# Patient Record
Sex: FEMALE | Race: WHITE | NOT HISPANIC OR LATINO | Employment: OTHER | URBAN - METROPOLITAN AREA
[De-identification: names, ages, dates, MRNs, and addresses within clinical notes are randomized per-mention and may not be internally consistent; named-entity substitution may affect disease eponyms.]

---

## 2017-03-07 ENCOUNTER — HISTORICAL (OUTPATIENT)
Dept: RADIOLOGY | Facility: HOSPITAL | Age: 72
End: 2017-03-07

## 2017-04-04 ENCOUNTER — HISTORICAL (OUTPATIENT)
Dept: ADMINISTRATIVE | Facility: HOSPITAL | Age: 72
End: 2017-04-04

## 2017-08-15 ENCOUNTER — HISTORICAL (OUTPATIENT)
Dept: ADMINISTRATIVE | Facility: HOSPITAL | Age: 72
End: 2017-08-15

## 2017-10-05 ENCOUNTER — HISTORICAL (OUTPATIENT)
Dept: ADMINISTRATIVE | Facility: HOSPITAL | Age: 72
End: 2017-10-05

## 2017-10-05 LAB
ABS NEUT (OLG): 3.26 X10(3)/MCL (ref 2.1–9.2)
ALBUMIN SERPL-MCNC: 4.3 GM/DL (ref 3.4–5)
ALBUMIN/GLOB SERPL: 1.4 {RATIO}
ALP SERPL-CCNC: 60 UNIT/L (ref 38–126)
ALT SERPL-CCNC: 16 UNIT/L (ref 12–78)
APPEARANCE, UA: CLEAR
AST SERPL-CCNC: 17 UNIT/L (ref 15–37)
BACTERIA SPEC CULT: ABNORMAL /HPF
BASOPHILS # BLD AUTO: 0 X10(3)/MCL (ref 0–0.2)
BASOPHILS NFR BLD AUTO: 0 %
BILIRUB SERPL-MCNC: 0.6 MG/DL (ref 0.2–1)
BILIRUB UR QL STRIP: NEGATIVE
BILIRUBIN DIRECT+TOT PNL SERPL-MCNC: 0.1 MG/DL (ref 0–0.2)
BILIRUBIN DIRECT+TOT PNL SERPL-MCNC: 0.5 MG/DL (ref 0–0.8)
BUN SERPL-MCNC: 20 MG/DL (ref 7–18)
CALCIUM SERPL-MCNC: 9.4 MG/DL (ref 8.5–10.1)
CHLORIDE SERPL-SCNC: 100 MMOL/L (ref 98–107)
CHOLEST SERPL-MCNC: 212 MG/DL (ref 0–200)
CHOLEST/HDLC SERPL: 3.5 {RATIO} (ref 0–4)
CO2 SERPL-SCNC: 32 MMOL/L (ref 21–32)
COLOR UR: YELLOW
CREAT SERPL-MCNC: 0.54 MG/DL (ref 0.55–1.02)
EOSINOPHIL # BLD AUTO: 0.1 X10(3)/MCL (ref 0–0.9)
EOSINOPHIL NFR BLD AUTO: 2 %
ERYTHROCYTE [DISTWIDTH] IN BLOOD BY AUTOMATED COUNT: 12.5 % (ref 11.5–17)
EST. AVERAGE GLUCOSE BLD GHB EST-MCNC: 128 MG/DL
GLOBULIN SER-MCNC: 3.1 GM/DL (ref 2.4–3.5)
GLUCOSE (UA): NEGATIVE
GLUCOSE SERPL-MCNC: 99 MG/DL (ref 74–106)
HBA1C MFR BLD: 6.1 % (ref 4.2–6.3)
HCT VFR BLD AUTO: 46.2 % (ref 37–47)
HDLC SERPL-MCNC: 61 MG/DL (ref 35–60)
HGB BLD-MCNC: 15.6 GM/DL (ref 12–16)
HGB UR QL STRIP: NEGATIVE
KETONES UR QL STRIP: NEGATIVE
LDLC SERPL CALC-MCNC: 120 MG/DL (ref 0–129)
LEUKOCYTE ESTERASE UR QL STRIP: ABNORMAL
LYMPHOCYTES # BLD AUTO: 1.7 X10(3)/MCL (ref 0.6–4.6)
LYMPHOCYTES NFR BLD AUTO: 29 %
MCH RBC QN AUTO: 32.8 PG (ref 27–31)
MCHC RBC AUTO-ENTMCNC: 33.8 GM/DL (ref 33–36)
MCV RBC AUTO: 97.1 FL (ref 80–94)
MONOCYTES # BLD AUTO: 0.6 X10(3)/MCL (ref 0.1–1.3)
MONOCYTES NFR BLD AUTO: 10 %
NEUTROPHILS # BLD AUTO: 3.26 X10(3)/MCL (ref 1.4–7.9)
NEUTROPHILS NFR BLD AUTO: 57 %
NITRITE UR QL STRIP: NEGATIVE
PH UR STRIP: 6.5 [PH] (ref 5–9)
PLATELET # BLD AUTO: 163 X10(3)/MCL (ref 130–400)
PMV BLD AUTO: 9.7 FL (ref 9.4–12.4)
POTASSIUM SERPL-SCNC: 4.2 MMOL/L (ref 3.5–5.1)
PROT SERPL-MCNC: 7.4 GM/DL (ref 6.4–8.2)
PROT UR QL STRIP: NEGATIVE
RBC # BLD AUTO: 4.76 X10(6)/MCL (ref 4.2–5.4)
RBC #/AREA URNS HPF: ABNORMAL /[HPF]
SODIUM SERPL-SCNC: 136 MMOL/L (ref 136–145)
SP GR UR STRIP: 1.01 (ref 1–1.03)
SQUAMOUS EPITHELIAL, UA: ABNORMAL
T4 FREE SERPL-MCNC: 0.98 NG/DL (ref 0.76–1.46)
TRIGL SERPL-MCNC: 157 MG/DL (ref 30–150)
TSH SERPL-ACNC: 0.41 MIU/ML (ref 0.36–3.74)
UROBILINOGEN UR STRIP-ACNC: 0.2
VLDLC SERPL CALC-MCNC: 31 MG/DL
WBC # SPEC AUTO: 5.7 X10(3)/MCL (ref 4.5–11.5)
WBC #/AREA URNS HPF: ABNORMAL /[HPF]

## 2017-12-12 ENCOUNTER — HISTORICAL (OUTPATIENT)
Dept: ADMINISTRATIVE | Facility: HOSPITAL | Age: 72
End: 2017-12-12

## 2018-03-08 ENCOUNTER — HISTORICAL (OUTPATIENT)
Dept: RADIOLOGY | Facility: HOSPITAL | Age: 73
End: 2018-03-08

## 2018-04-05 ENCOUNTER — HISTORICAL (OUTPATIENT)
Dept: ADMINISTRATIVE | Facility: HOSPITAL | Age: 73
End: 2018-04-05

## 2018-04-05 LAB
ABS NEUT (OLG): 2.78 X10(3)/MCL (ref 2.1–9.2)
ALBUMIN SERPL-MCNC: 3.9 GM/DL (ref 3.4–5)
ALBUMIN/GLOB SERPL: 1.1 RATIO (ref 1.1–2)
ALP SERPL-CCNC: 62 UNIT/L (ref 38–126)
ALT SERPL-CCNC: 17 UNIT/L (ref 12–78)
APPEARANCE, UA: CLEAR
AST SERPL-CCNC: 20 UNIT/L (ref 15–37)
BACTERIA SPEC CULT: ABNORMAL /HPF
BASOPHILS # BLD AUTO: 0 X10(3)/MCL (ref 0–0.2)
BASOPHILS NFR BLD AUTO: 0 %
BILIRUB SERPL-MCNC: 0.6 MG/DL (ref 0.2–1)
BILIRUB UR QL STRIP: NEGATIVE
BILIRUBIN DIRECT+TOT PNL SERPL-MCNC: 0.1 MG/DL (ref 0–0.5)
BILIRUBIN DIRECT+TOT PNL SERPL-MCNC: 0.5 MG/DL (ref 0–0.8)
BUN SERPL-MCNC: 21 MG/DL (ref 7–18)
CALCIUM SERPL-MCNC: 9.1 MG/DL (ref 8.5–10.1)
CHLORIDE SERPL-SCNC: 99 MMOL/L (ref 98–107)
CHOLEST SERPL-MCNC: 198 MG/DL (ref 0–200)
CHOLEST/HDLC SERPL: 4.1 {RATIO} (ref 0–4)
CO2 SERPL-SCNC: 31 MMOL/L (ref 21–32)
COLOR UR: YELLOW
CREAT SERPL-MCNC: 0.64 MG/DL (ref 0.55–1.02)
CREAT UR-MCNC: 105 MG/DL
DEPRECATED CALCIDIOL+CALCIFEROL SERPL-MC: 31 NG/ML (ref 30–80)
EOSINOPHIL # BLD AUTO: 0.1 X10(3)/MCL (ref 0–0.9)
EOSINOPHIL NFR BLD AUTO: 3 %
ERYTHROCYTE [DISTWIDTH] IN BLOOD BY AUTOMATED COUNT: 12.3 % (ref 11.5–17)
EST. AVERAGE GLUCOSE BLD GHB EST-MCNC: 123 MG/DL
GLOBULIN SER-MCNC: 3.4 GM/DL (ref 2.4–3.5)
GLUCOSE (UA): NEGATIVE
GLUCOSE SERPL-MCNC: 115 MG/DL (ref 74–106)
HBA1C MFR BLD: 5.9 % (ref 4.2–6.3)
HCT VFR BLD AUTO: 44.1 % (ref 37–47)
HDLC SERPL-MCNC: 48 MG/DL (ref 35–60)
HGB BLD-MCNC: 14.1 GM/DL (ref 12–16)
HGB UR QL STRIP: NEGATIVE
KETONES UR QL STRIP: NEGATIVE
LDLC SERPL CALC-MCNC: 108 MG/DL (ref 0–129)
LEUKOCYTE ESTERASE UR QL STRIP: NEGATIVE
LYMPHOCYTES # BLD AUTO: 1.4 X10(3)/MCL (ref 0.6–4.6)
LYMPHOCYTES NFR BLD AUTO: 29 %
MCH RBC QN AUTO: 31.5 PG (ref 27–31)
MCHC RBC AUTO-ENTMCNC: 32 GM/DL (ref 33–36)
MCV RBC AUTO: 98.7 FL (ref 80–94)
MICROALBUMIN UR-MCNC: 1.2 MG/DL
MICROALBUMIN/CREAT RATIO PNL UR: 11.4 MG/GM CR (ref 0–30)
MONOCYTES # BLD AUTO: 0.6 X10(3)/MCL (ref 0.1–1.3)
MONOCYTES NFR BLD AUTO: 12 %
NEUTROPHILS # BLD AUTO: 2.78 X10(3)/MCL (ref 1.4–7.9)
NEUTROPHILS NFR BLD AUTO: 56 %
NITRITE UR QL STRIP: NEGATIVE
PH UR STRIP: 7 [PH] (ref 5–9)
PLATELET # BLD AUTO: 151 X10(3)/MCL (ref 130–400)
PMV BLD AUTO: 9.7 FL (ref 9.4–12.4)
POTASSIUM SERPL-SCNC: 4.4 MMOL/L (ref 3.5–5.1)
PROT SERPL-MCNC: 7.3 GM/DL (ref 6.4–8.2)
PROT UR QL STRIP: NEGATIVE
RBC # BLD AUTO: 4.47 X10(6)/MCL (ref 4.2–5.4)
RBC #/AREA URNS HPF: ABNORMAL /[HPF]
SODIUM SERPL-SCNC: 137 MMOL/L (ref 136–145)
SP GR UR STRIP: 1.02 (ref 1–1.03)
SQUAMOUS EPITHELIAL, UA: ABNORMAL
T4 FREE SERPL-MCNC: 1.06 NG/DL (ref 0.76–1.46)
TRIGL SERPL-MCNC: 211 MG/DL (ref 30–150)
TSH SERPL-ACNC: 0.29 MIU/ML (ref 0.36–3.74)
UA WBC MAN: ABNORMAL /HPF
UROBILINOGEN UR STRIP-ACNC: 0.2
VLDLC SERPL CALC-MCNC: 42 MG/DL
WBC # SPEC AUTO: 5 X10(3)/MCL (ref 4.5–11.5)

## 2018-04-26 ENCOUNTER — HISTORICAL (OUTPATIENT)
Dept: LAB | Facility: HOSPITAL | Age: 73
End: 2018-04-26

## 2018-10-04 ENCOUNTER — HISTORICAL (OUTPATIENT)
Dept: ADMINISTRATIVE | Facility: HOSPITAL | Age: 73
End: 2018-10-04

## 2018-10-04 LAB
ABS NEUT (OLG): 4.83 X10(3)/MCL (ref 2.1–9.2)
ALBUMIN SERPL-MCNC: 3.6 GM/DL (ref 3.4–5)
ALBUMIN/GLOB SERPL: 1.1 RATIO (ref 1.1–2)
ALP SERPL-CCNC: 57 UNIT/L (ref 38–126)
ALT SERPL-CCNC: 14 UNIT/L (ref 12–78)
APPEARANCE, UA: CLEAR
AST SERPL-CCNC: 19 UNIT/L (ref 15–37)
BACTERIA SPEC CULT: ABNORMAL /HPF
BASOPHILS # BLD AUTO: 0 X10(3)/MCL (ref 0–0.2)
BASOPHILS NFR BLD AUTO: 0 %
BILIRUB SERPL-MCNC: 0.5 MG/DL (ref 0.2–1)
BILIRUB UR QL STRIP: NEGATIVE
BILIRUBIN DIRECT+TOT PNL SERPL-MCNC: 0.1 MG/DL (ref 0–0.5)
BILIRUBIN DIRECT+TOT PNL SERPL-MCNC: 0.4 MG/DL (ref 0–0.8)
BUN SERPL-MCNC: 22 MG/DL (ref 7–18)
CALCIUM SERPL-MCNC: 9.4 MG/DL (ref 8.5–10.1)
CHLORIDE SERPL-SCNC: 102 MMOL/L (ref 98–107)
CHOLEST SERPL-MCNC: 164 MG/DL (ref 0–200)
CHOLEST/HDLC SERPL: 3.6 {RATIO} (ref 0–4)
CO2 SERPL-SCNC: 32 MMOL/L (ref 21–32)
COLOR UR: YELLOW
CREAT SERPL-MCNC: 0.62 MG/DL (ref 0.55–1.02)
DEPRECATED CALCIDIOL+CALCIFEROL SERPL-MC: 29.92 NG/ML (ref 30–80)
EOSINOPHIL # BLD AUTO: 0.2 X10(3)/MCL (ref 0–0.9)
EOSINOPHIL NFR BLD AUTO: 2 %
ERYTHROCYTE [DISTWIDTH] IN BLOOD BY AUTOMATED COUNT: 12.5 % (ref 11.5–17)
EST. AVERAGE GLUCOSE BLD GHB EST-MCNC: 126 MG/DL
GLOBULIN SER-MCNC: 3.4 GM/DL (ref 2.4–3.5)
GLUCOSE (UA): NEGATIVE
GLUCOSE SERPL-MCNC: 105 MG/DL (ref 74–106)
HBA1C MFR BLD: 6 % (ref 4.2–6.3)
HCT VFR BLD AUTO: 42.4 % (ref 37–47)
HDLC SERPL-MCNC: 45 MG/DL (ref 35–60)
HGB BLD-MCNC: 13.3 GM/DL (ref 12–16)
HGB UR QL STRIP: NEGATIVE
KETONES UR QL STRIP: NEGATIVE
LDLC SERPL CALC-MCNC: 85 MG/DL (ref 0–129)
LEUKOCYTE ESTERASE UR QL STRIP: ABNORMAL
LYMPHOCYTES # BLD AUTO: 1.6 X10(3)/MCL (ref 0.6–4.6)
LYMPHOCYTES NFR BLD AUTO: 21 %
MCH RBC QN AUTO: 30.8 PG (ref 27–31)
MCHC RBC AUTO-ENTMCNC: 31.4 GM/DL (ref 33–36)
MCV RBC AUTO: 98.1 FL (ref 80–94)
MONOCYTES # BLD AUTO: 0.7 X10(3)/MCL (ref 0.1–1.3)
MONOCYTES NFR BLD AUTO: 10 %
NEUTROPHILS # BLD AUTO: 4.83 X10(3)/MCL (ref 2.1–9.2)
NEUTROPHILS NFR BLD AUTO: 66 %
NITRITE UR QL STRIP: NEGATIVE
PH UR STRIP: 7 [PH] (ref 5–9)
PLATELET # BLD AUTO: 164 X10(3)/MCL (ref 130–400)
PMV BLD AUTO: 9.4 FL (ref 9.4–12.4)
POTASSIUM SERPL-SCNC: 4.9 MMOL/L (ref 3.5–5.1)
PROT SERPL-MCNC: 7 GM/DL (ref 6.4–8.2)
PROT UR QL STRIP: NEGATIVE
RBC # BLD AUTO: 4.32 X10(6)/MCL (ref 4.2–5.4)
RBC #/AREA URNS HPF: ABNORMAL /[HPF]
SODIUM SERPL-SCNC: 141 MMOL/L (ref 136–145)
SP GR UR STRIP: 1.01 (ref 1–1.03)
SQUAMOUS EPITHELIAL, UA: ABNORMAL
T4 FREE SERPL-MCNC: 1.07 NG/DL (ref 0.76–1.46)
TRIGL SERPL-MCNC: 172 MG/DL (ref 30–150)
TSH SERPL-ACNC: 0.13 MIU/L (ref 0.36–3.74)
UROBILINOGEN UR STRIP-ACNC: 0.2
VLDLC SERPL CALC-MCNC: 34 MG/DL
WBC # SPEC AUTO: 7.3 X10(3)/MCL (ref 4.5–11.5)
WBC #/AREA URNS HPF: ABNORMAL /[HPF]

## 2018-10-18 ENCOUNTER — HISTORICAL (OUTPATIENT)
Dept: RADIOLOGY | Facility: HOSPITAL | Age: 73
End: 2018-10-18

## 2018-11-15 ENCOUNTER — HISTORICAL (OUTPATIENT)
Dept: ADMINISTRATIVE | Facility: HOSPITAL | Age: 73
End: 2018-11-15

## 2019-03-04 ENCOUNTER — HISTORICAL (OUTPATIENT)
Dept: ADMINISTRATIVE | Facility: HOSPITAL | Age: 74
End: 2019-03-04

## 2019-03-04 LAB
APPEARANCE, UA: CLEAR
BACTERIA #/AREA URNS AUTO: ABNORMAL /HPF
BILIRUB UR QL STRIP: NEGATIVE
COLOR UR: ABNORMAL
GLUCOSE (UA): NEGATIVE
HGB UR QL STRIP: NEGATIVE
KETONES UR QL STRIP: NEGATIVE
LEUKOCYTE ESTERASE UR QL STRIP: ABNORMAL
NITRITE UR QL STRIP.AUTO: NEGATIVE
PH UR STRIP: 8.5 [PH] (ref 5–9)
PROT UR QL STRIP: NEGATIVE
RBC #/AREA URNS HPF: ABNORMAL /[HPF]
SP GR UR STRIP: 1.02 (ref 1–1.03)
SQUAMOUS EPITHELIAL, UA: ABNORMAL
UROBILINOGEN UR STRIP-ACNC: 0.2
WBC #/AREA URNS AUTO: 23 /HPF (ref 0–3)

## 2019-04-02 ENCOUNTER — HISTORICAL (OUTPATIENT)
Dept: ADMINISTRATIVE | Facility: HOSPITAL | Age: 74
End: 2019-04-02

## 2019-04-02 LAB
ABS NEUT (OLG): 4.3 X10(3)/MCL (ref 2.1–9.2)
ALBUMIN SERPL-MCNC: 3.8 GM/DL (ref 3.4–5)
ALBUMIN/GLOB SERPL: 1.2 {RATIO}
ALP SERPL-CCNC: 67 UNIT/L (ref 38–126)
ALT SERPL-CCNC: 16 UNIT/L (ref 12–78)
APPEARANCE, UA: ABNORMAL
AST SERPL-CCNC: 20 UNIT/L (ref 15–37)
BACTERIA SPEC CULT: ABNORMAL /HPF
BASOPHILS # BLD AUTO: 0 X10(3)/MCL (ref 0–0.2)
BASOPHILS NFR BLD AUTO: 0 %
BILIRUB SERPL-MCNC: 0.4 MG/DL (ref 0.2–1)
BILIRUB UR QL STRIP: NEGATIVE
BILIRUBIN DIRECT+TOT PNL SERPL-MCNC: 0.2 MG/DL (ref 0–0.2)
BILIRUBIN DIRECT+TOT PNL SERPL-MCNC: 0.2 MG/DL (ref 0–0.8)
BUN SERPL-MCNC: 18 MG/DL (ref 7–18)
CALCIUM SERPL-MCNC: 8.8 MG/DL (ref 8.5–10.1)
CHLORIDE SERPL-SCNC: 103 MMOL/L (ref 98–107)
CHOLEST SERPL-MCNC: 189 MG/DL (ref 0–200)
CHOLEST/HDLC SERPL: 4.4 {RATIO} (ref 0–4)
CO2 SERPL-SCNC: 29 MMOL/L (ref 21–32)
COLOR UR: YELLOW
CREAT SERPL-MCNC: 0.65 MG/DL (ref 0.55–1.02)
CREAT UR-MCNC: 55 MG/DL
EOSINOPHIL # BLD AUTO: 0.2 X10(3)/MCL (ref 0–0.9)
EOSINOPHIL NFR BLD AUTO: 3 %
ERYTHROCYTE [DISTWIDTH] IN BLOOD BY AUTOMATED COUNT: 12.6 % (ref 11.5–17)
EST. AVERAGE GLUCOSE BLD GHB EST-MCNC: 140 MG/DL
GLOBULIN SER-MCNC: 3.2 GM/DL (ref 2.4–3.5)
GLUCOSE (UA): NEGATIVE
GLUCOSE SERPL-MCNC: 104 MG/DL (ref 74–106)
HBA1C MFR BLD: 6.5 % (ref 4.2–6.3)
HCT VFR BLD AUTO: 44.2 % (ref 37–47)
HDLC SERPL-MCNC: 43 MG/DL (ref 35–60)
HGB BLD-MCNC: 14.3 GM/DL (ref 12–16)
HGB UR QL STRIP: ABNORMAL
KETONES UR QL STRIP: NEGATIVE
LDLC SERPL CALC-MCNC: 95 MG/DL (ref 0–129)
LEUKOCYTE ESTERASE UR QL STRIP: ABNORMAL
LYMPHOCYTES # BLD AUTO: 1.4 X10(3)/MCL (ref 0.6–4.6)
LYMPHOCYTES NFR BLD AUTO: 21 %
MCH RBC QN AUTO: 31.1 PG (ref 27–31)
MCHC RBC AUTO-ENTMCNC: 32.4 GM/DL (ref 33–36)
MCV RBC AUTO: 96.1 FL (ref 80–94)
MICROALBUMIN UR-MCNC: 2 MG/DL
MICROALBUMIN/CREAT RATIO PNL UR: 36.4 MG/GM CR (ref 0–30)
MONOCYTES # BLD AUTO: 0.6 X10(3)/MCL (ref 0.1–1.3)
MONOCYTES NFR BLD AUTO: 10 %
NEUTROPHILS # BLD AUTO: 4.3 X10(3)/MCL (ref 2.1–9.2)
NEUTROPHILS NFR BLD AUTO: 66 %
NITRITE UR QL STRIP: NEGATIVE
PH UR STRIP: 7 [PH] (ref 5–9)
PLATELET # BLD AUTO: 171 X10(3)/MCL (ref 130–400)
PMV BLD AUTO: 9.7 FL (ref 9.4–12.4)
POTASSIUM SERPL-SCNC: 4.4 MMOL/L (ref 3.5–5.1)
PROT SERPL-MCNC: 7 GM/DL (ref 6.4–8.2)
PROT UR QL STRIP: NEGATIVE
RBC # BLD AUTO: 4.6 X10(6)/MCL (ref 4.2–5.4)
RBC #/AREA URNS HPF: 24 /HPF (ref 0–2)
SODIUM SERPL-SCNC: 138 MMOL/L (ref 136–145)
SP GR UR STRIP: 1.01 (ref 1–1.03)
SQUAMOUS EPITHELIAL, UA: ABNORMAL
T4 FREE SERPL-MCNC: 1 NG/DL (ref 0.76–1.46)
TRIGL SERPL-MCNC: 253 MG/DL (ref 30–150)
TSH SERPL-ACNC: 0.22 MIU/L (ref 0.36–3.74)
UROBILINOGEN UR STRIP-ACNC: 0.2
VLDLC SERPL CALC-MCNC: 51 MG/DL
WBC # SPEC AUTO: 6.5 X10(3)/MCL (ref 4.5–11.5)
WBC #/AREA URNS HPF: 226 /HPF (ref 0–3)

## 2019-04-16 ENCOUNTER — HISTORICAL (OUTPATIENT)
Dept: RADIOLOGY | Facility: HOSPITAL | Age: 74
End: 2019-04-16

## 2019-05-14 ENCOUNTER — HISTORICAL (OUTPATIENT)
Dept: RADIOLOGY | Facility: HOSPITAL | Age: 74
End: 2019-05-14

## 2019-05-22 ENCOUNTER — HISTORICAL (OUTPATIENT)
Dept: RADIOLOGY | Facility: HOSPITAL | Age: 74
End: 2019-05-22

## 2019-06-25 ENCOUNTER — HISTORICAL (OUTPATIENT)
Dept: PREADMISSION TESTING | Facility: HOSPITAL | Age: 74
End: 2019-06-25

## 2019-06-25 LAB
ABS NEUT (OLG): 3.62 X10(3)/MCL (ref 2.1–9.2)
ALBUMIN SERPL-MCNC: 4 GM/DL (ref 3.4–5)
ALBUMIN/GLOB SERPL: 1.3 RATIO (ref 1.1–2)
ALP SERPL-CCNC: 66 UNIT/L (ref 38–126)
ALT SERPL-CCNC: 16 UNIT/L (ref 12–78)
AST SERPL-CCNC: 22 UNIT/L (ref 15–37)
BASOPHILS # BLD AUTO: 0 X10(3)/MCL (ref 0–0.2)
BASOPHILS NFR BLD AUTO: 0 %
BILIRUB SERPL-MCNC: 0.4 MG/DL (ref 0.2–1)
BILIRUBIN DIRECT+TOT PNL SERPL-MCNC: 0.1 MG/DL (ref 0–0.5)
BILIRUBIN DIRECT+TOT PNL SERPL-MCNC: 0.3 MG/DL (ref 0–0.8)
BUN SERPL-MCNC: 21 MG/DL (ref 7–18)
CALCIUM SERPL-MCNC: 9.8 MG/DL (ref 8.5–10.1)
CHLORIDE SERPL-SCNC: 101 MMOL/L (ref 98–107)
CO2 SERPL-SCNC: 32 MMOL/L (ref 21–32)
CREAT SERPL-MCNC: 0.65 MG/DL (ref 0.55–1.02)
EOSINOPHIL # BLD AUTO: 0.1 X10(3)/MCL (ref 0–0.9)
EOSINOPHIL NFR BLD AUTO: 1 %
ERYTHROCYTE [DISTWIDTH] IN BLOOD BY AUTOMATED COUNT: 12.7 % (ref 11.5–17)
GLOBULIN SER-MCNC: 3.1 GM/DL (ref 2.4–3.5)
GLUCOSE SERPL-MCNC: 85 MG/DL (ref 74–106)
HCT VFR BLD AUTO: 45.8 % (ref 37–47)
HGB BLD-MCNC: 14.4 GM/DL (ref 12–16)
LYMPHOCYTES # BLD AUTO: 1.4 X10(3)/MCL (ref 0.6–4.6)
LYMPHOCYTES NFR BLD AUTO: 24 %
MCH RBC QN AUTO: 31.2 PG (ref 27–31)
MCHC RBC AUTO-ENTMCNC: 31.4 GM/DL (ref 33–36)
MCV RBC AUTO: 99.3 FL (ref 80–94)
MONOCYTES # BLD AUTO: 0.7 X10(3)/MCL (ref 0.1–1.3)
MONOCYTES NFR BLD AUTO: 12 %
NEUTROPHILS # BLD AUTO: 3.62 X10(3)/MCL (ref 2.1–9.2)
NEUTROPHILS NFR BLD AUTO: 62 %
PLATELET # BLD AUTO: 165 X10(3)/MCL (ref 130–400)
PMV BLD AUTO: 10.7 FL (ref 9.4–12.4)
POTASSIUM SERPL-SCNC: 4.5 MMOL/L (ref 3.5–5.1)
PROT SERPL-MCNC: 7.1 GM/DL (ref 6.4–8.2)
RBC # BLD AUTO: 4.61 X10(6)/MCL (ref 4.2–5.4)
SODIUM SERPL-SCNC: 141 MMOL/L (ref 136–145)
WBC # SPEC AUTO: 5.8 X10(3)/MCL (ref 4.5–11.5)

## 2019-07-08 ENCOUNTER — HISTORICAL (OUTPATIENT)
Dept: SURGERY | Facility: HOSPITAL | Age: 74
End: 2019-07-08

## 2019-07-23 ENCOUNTER — HISTORICAL (OUTPATIENT)
Dept: ADMINISTRATIVE | Facility: HOSPITAL | Age: 74
End: 2019-07-23

## 2019-09-26 ENCOUNTER — HISTORICAL (OUTPATIENT)
Dept: RADIOLOGY | Facility: HOSPITAL | Age: 74
End: 2019-09-26

## 2019-10-03 ENCOUNTER — HISTORICAL (OUTPATIENT)
Dept: ADMINISTRATIVE | Facility: HOSPITAL | Age: 74
End: 2019-10-03

## 2019-10-03 LAB
ABS NEUT (OLG): 2.78 X10(3)/MCL (ref 2.1–9.2)
ALBUMIN SERPL-MCNC: 3.9 GM/DL (ref 3.4–5)
ALBUMIN/GLOB SERPL: 1.3 {RATIO}
ALP SERPL-CCNC: 62 UNIT/L (ref 38–126)
ALT SERPL-CCNC: 19 UNIT/L (ref 12–78)
APPEARANCE, UA: CLEAR
AST SERPL-CCNC: 20 UNIT/L (ref 15–37)
BACTERIA SPEC CULT: ABNORMAL /HPF
BASOPHILS # BLD AUTO: 0 X10(3)/MCL (ref 0–0.2)
BASOPHILS NFR BLD AUTO: 0 %
BILIRUB SERPL-MCNC: 0.8 MG/DL (ref 0.2–1)
BILIRUB UR QL STRIP: NEGATIVE
BILIRUBIN DIRECT+TOT PNL SERPL-MCNC: 0.1 MG/DL (ref 0–0.2)
BILIRUBIN DIRECT+TOT PNL SERPL-MCNC: 0.7 MG/DL (ref 0–0.8)
BUN SERPL-MCNC: 19 MG/DL (ref 7–18)
CALCIUM SERPL-MCNC: 9.5 MG/DL (ref 8.5–10.1)
CHLORIDE SERPL-SCNC: 103 MMOL/L (ref 98–107)
CHOLEST SERPL-MCNC: 197 MG/DL (ref 0–200)
CHOLEST/HDLC SERPL: 3.9 {RATIO} (ref 0–4)
CO2 SERPL-SCNC: 33 MMOL/L (ref 21–32)
COLOR UR: YELLOW
CREAT SERPL-MCNC: 0.57 MG/DL (ref 0.55–1.02)
CREAT UR-MCNC: 53 MG/DL
EOSINOPHIL # BLD AUTO: 0.1 X10(3)/MCL (ref 0–0.9)
EOSINOPHIL NFR BLD AUTO: 3 %
ERYTHROCYTE [DISTWIDTH] IN BLOOD BY AUTOMATED COUNT: 12.9 % (ref 11.5–17)
EST. AVERAGE GLUCOSE BLD GHB EST-MCNC: 128 MG/DL
GLOBULIN SER-MCNC: 2.9 GM/DL (ref 2.4–3.5)
GLUCOSE (UA): NEGATIVE
GLUCOSE SERPL-MCNC: 104 MG/DL (ref 74–106)
HBA1C MFR BLD: 6.1 % (ref 4.2–6.3)
HCT VFR BLD AUTO: 44.7 % (ref 37–47)
HDLC SERPL-MCNC: 51 MG/DL (ref 35–60)
HGB BLD-MCNC: 14 GM/DL (ref 12–16)
HGB UR QL STRIP: NEGATIVE
KETONES UR QL STRIP: NEGATIVE
LDLC SERPL CALC-MCNC: 102 MG/DL (ref 0–129)
LEUKOCYTE ESTERASE UR QL STRIP: NEGATIVE
LYMPHOCYTES # BLD AUTO: 1.3 X10(3)/MCL (ref 0.6–4.6)
LYMPHOCYTES NFR BLD AUTO: 28 %
MCH RBC QN AUTO: 30.8 PG (ref 27–31)
MCHC RBC AUTO-ENTMCNC: 31.3 GM/DL (ref 33–36)
MCV RBC AUTO: 98.2 FL (ref 80–94)
MICROALBUMIN UR-MCNC: <0.5 MG/DL
MICROALBUMIN/CREAT RATIO PNL UR: <9.4 MG/GM CR (ref 0–30)
MONOCYTES # BLD AUTO: 0.6 X10(3)/MCL (ref 0.1–1.3)
MONOCYTES NFR BLD AUTO: 12 %
NEUTROPHILS # BLD AUTO: 2.78 X10(3)/MCL (ref 2.1–9.2)
NEUTROPHILS NFR BLD AUTO: 57 %
NITRITE UR QL STRIP: NEGATIVE
PH UR STRIP: 8 [PH] (ref 5–9)
PLATELET # BLD AUTO: 154 X10(3)/MCL (ref 130–400)
PMV BLD AUTO: 9.7 FL (ref 9.4–12.4)
POTASSIUM SERPL-SCNC: 4.2 MMOL/L (ref 3.5–5.1)
PROT SERPL-MCNC: 6.8 GM/DL (ref 6.4–8.2)
PROT UR QL STRIP: NEGATIVE
RBC # BLD AUTO: 4.55 X10(6)/MCL (ref 4.2–5.4)
RBC #/AREA URNS HPF: 6 /HPF (ref 0–2)
SODIUM SERPL-SCNC: 139 MMOL/L (ref 136–145)
SP GR UR STRIP: 1.01 (ref 1–1.03)
SQUAMOUS EPITHELIAL, UA: ABNORMAL
T4 FREE SERPL-MCNC: 1.01 NG/DL (ref 0.76–1.46)
TRIGL SERPL-MCNC: 219 MG/DL (ref 30–150)
TSH SERPL-ACNC: 0.69 MIU/L (ref 0.36–3.74)
UROBILINOGEN UR STRIP-ACNC: 0.2
VLDLC SERPL CALC-MCNC: 44 MG/DL
WBC # SPEC AUTO: 4.8 X10(3)/MCL (ref 4.5–11.5)
WBC #/AREA URNS HPF: ABNORMAL /[HPF]

## 2020-01-09 ENCOUNTER — HISTORICAL (OUTPATIENT)
Dept: HEMATOLOGY/ONCOLOGY | Facility: CLINIC | Age: 75
End: 2020-01-09

## 2020-01-09 LAB
ABS NEUT (OLG): 4.73 X10(3)/MCL (ref 2.1–9.2)
ALBUMIN SERPL-MCNC: 3.6 GM/DL (ref 3.4–5)
ALBUMIN/GLOB SERPL: 1 RATIO (ref 1.1–2)
ALP SERPL-CCNC: 74 UNIT/L (ref 38–126)
ALT SERPL-CCNC: 16 UNIT/L (ref 12–78)
AST SERPL-CCNC: 20 UNIT/L (ref 15–37)
BASOPHILS # BLD AUTO: 0 X10(3)/MCL (ref 0–0.2)
BASOPHILS NFR BLD AUTO: 0.3 %
BILIRUB SERPL-MCNC: 0.3 MG/DL (ref 0.2–1)
BILIRUBIN DIRECT+TOT PNL SERPL-MCNC: 0.1 MG/DL (ref 0–0.5)
BILIRUBIN DIRECT+TOT PNL SERPL-MCNC: 0.2 MG/DL (ref 0–0.8)
BUN SERPL-MCNC: 19 MG/DL (ref 7–18)
CALCIUM SERPL-MCNC: 9.4 MG/DL (ref 8.5–10.1)
CHLORIDE SERPL-SCNC: 102 MMOL/L (ref 98–107)
CO2 SERPL-SCNC: 31 MMOL/L (ref 21–32)
CREAT SERPL-MCNC: 0.61 MG/DL (ref 0.55–1.02)
EOSINOPHIL # BLD AUTO: 0.2 X10(3)/MCL (ref 0–0.9)
EOSINOPHIL NFR BLD AUTO: 3 %
ERYTHROCYTE [DISTWIDTH] IN BLOOD BY AUTOMATED COUNT: 11.9 % (ref 11.5–17)
GLOBULIN SER-MCNC: 3.5 GM/DL (ref 2.4–3.5)
GLUCOSE SERPL-MCNC: 84 MG/DL (ref 74–106)
HCT VFR BLD AUTO: 40.7 % (ref 37–47)
HGB BLD-MCNC: 13.3 GM/DL (ref 12–16)
LYMPHOCYTES # BLD AUTO: 1.5 X10(3)/MCL (ref 0.6–4.6)
LYMPHOCYTES NFR BLD AUTO: 20.9 %
MCH RBC QN AUTO: 31.3 PG (ref 27–31)
MCHC RBC AUTO-ENTMCNC: 32.7 GM/DL (ref 33–36)
MCV RBC AUTO: 95.8 FL (ref 80–94)
MONOCYTES # BLD AUTO: 0.8 X10(3)/MCL (ref 0.1–1.3)
MONOCYTES NFR BLD AUTO: 10.5 %
NEUTROPHILS # BLD AUTO: 4.7 X10(3)/MCL (ref 2.1–9.2)
NEUTROPHILS NFR BLD AUTO: 65.2 %
PLATELET # BLD AUTO: 187 X10(3)/MCL (ref 130–400)
PMV BLD AUTO: 8.7 FL (ref 9.4–12.4)
POTASSIUM SERPL-SCNC: 4 MMOL/L (ref 3.5–5.1)
PROT SERPL-MCNC: 7.1 GM/DL (ref 6.4–8.2)
RBC # BLD AUTO: 4.25 X10(6)/MCL (ref 4.2–5.4)
SODIUM SERPL-SCNC: 140 MMOL/L (ref 136–145)
WBC # SPEC AUTO: 7.3 X10(3)/MCL (ref 4.5–11.5)

## 2020-05-20 ENCOUNTER — HISTORICAL (OUTPATIENT)
Dept: ADMINISTRATIVE | Facility: HOSPITAL | Age: 75
End: 2020-05-20

## 2020-06-16 ENCOUNTER — HISTORICAL (OUTPATIENT)
Dept: RADIOLOGY | Facility: HOSPITAL | Age: 75
End: 2020-06-16

## 2020-06-25 ENCOUNTER — HISTORICAL (OUTPATIENT)
Dept: ADMINISTRATIVE | Facility: HOSPITAL | Age: 75
End: 2020-06-25

## 2020-06-25 LAB
ABS NEUT (OLG): 2.59 X10(3)/MCL (ref 2.1–9.2)
ALBUMIN SERPL-MCNC: 4 GM/DL (ref 3.4–5)
ALBUMIN/GLOB SERPL: 1.4 RATIO (ref 1.1–2)
ALP SERPL-CCNC: 65 UNIT/L (ref 40–150)
ALT SERPL-CCNC: 13 UNIT/L (ref 0–55)
APPEARANCE, UA: CLEAR
AST SERPL-CCNC: 22 UNIT/L (ref 5–34)
BACTERIA SPEC CULT: NORMAL /HPF
BASOPHILS # BLD AUTO: 0 X10(3)/MCL (ref 0–0.2)
BASOPHILS NFR BLD AUTO: 1 %
BILIRUB SERPL-MCNC: 0.5 MG/DL
BILIRUB UR QL STRIP: NEGATIVE
BILIRUBIN DIRECT+TOT PNL SERPL-MCNC: 0.2 MG/DL (ref 0–0.5)
BILIRUBIN DIRECT+TOT PNL SERPL-MCNC: 0.3 MG/DL (ref 0–0.8)
BUN SERPL-MCNC: 21.1 MG/DL (ref 9.8–20.1)
CALCIUM SERPL-MCNC: 9.1 MG/DL (ref 8.4–10.2)
CHLORIDE SERPL-SCNC: 102 MMOL/L (ref 98–107)
CHOLEST SERPL-MCNC: 203 MG/DL
CHOLEST/HDLC SERPL: 4 {RATIO} (ref 0–5)
CO2 SERPL-SCNC: 30 MMOL/L (ref 23–31)
COLOR UR: YELLOW
CREAT SERPL-MCNC: 0.61 MG/DL (ref 0.55–1.02)
DEPRECATED CALCIDIOL+CALCIFEROL SERPL-MC: 35.6 NG/ML (ref 6.6–49.9)
EOSINOPHIL # BLD AUTO: 0.2 X10(3)/MCL (ref 0–0.9)
EOSINOPHIL NFR BLD AUTO: 3 %
ERYTHROCYTE [DISTWIDTH] IN BLOOD BY AUTOMATED COUNT: 12.7 % (ref 11.5–17)
EST. AVERAGE GLUCOSE BLD GHB EST-MCNC: 131.2 MG/DL
GLOBULIN SER-MCNC: 2.9 GM/DL (ref 2.4–3.5)
GLUCOSE (UA): NEGATIVE
GLUCOSE SERPL-MCNC: 122 MG/DL (ref 82–115)
HBA1C MFR BLD: 6.2 %
HCT VFR BLD AUTO: 41.9 % (ref 37–47)
HDLC SERPL-MCNC: 48 MG/DL (ref 35–60)
HGB BLD-MCNC: 13.4 GM/DL (ref 12–16)
HGB UR QL STRIP: NEGATIVE
KETONES UR QL STRIP: NEGATIVE
LDLC SERPL CALC-MCNC: 114 MG/DL (ref 50–140)
LEUKOCYTE ESTERASE UR QL STRIP: NEGATIVE
LYMPHOCYTES # BLD AUTO: 1.2 X10(3)/MCL (ref 0.6–4.6)
LYMPHOCYTES NFR BLD AUTO: 27 %
MCH RBC QN AUTO: 31.2 PG (ref 27–31)
MCHC RBC AUTO-ENTMCNC: 32 GM/DL (ref 33–36)
MCV RBC AUTO: 97.4 FL (ref 80–94)
MONOCYTES # BLD AUTO: 0.5 X10(3)/MCL (ref 0.1–1.3)
MONOCYTES NFR BLD AUTO: 12 %
NEUTROPHILS # BLD AUTO: 2.59 X10(3)/MCL (ref 2.1–9.2)
NEUTROPHILS NFR BLD AUTO: 56 %
NITRITE UR QL STRIP: NEGATIVE
PH UR STRIP: 6 [PH] (ref 5–9)
PLATELET # BLD AUTO: 157 X10(3)/MCL (ref 130–400)
PMV BLD AUTO: 9.5 FL (ref 9.4–12.4)
POTASSIUM SERPL-SCNC: 4.3 MMOL/L (ref 3.5–5.1)
PROT SERPL-MCNC: 6.9 GM/DL (ref 5.8–7.6)
PROT UR QL STRIP: NEGATIVE
RBC # BLD AUTO: 4.3 X10(6)/MCL (ref 4.2–5.4)
RBC #/AREA URNS HPF: NORMAL /[HPF]
SODIUM SERPL-SCNC: 137 MMOL/L (ref 136–145)
SP GR UR STRIP: 1.02 (ref 1–1.03)
SQUAMOUS EPITHELIAL, UA: NORMAL
TRIGL SERPL-MCNC: 204 MG/DL (ref 37–140)
TSH SERPL-ACNC: 0.89 UIU/ML (ref 0.35–4.94)
UROBILINOGEN UR STRIP-ACNC: 0.2
VLDLC SERPL CALC-MCNC: 41 MG/DL
WBC # SPEC AUTO: 4.6 X10(3)/MCL (ref 4.5–11.5)
WBC #/AREA URNS HPF: NORMAL /[HPF]

## 2020-11-17 ENCOUNTER — HISTORICAL (OUTPATIENT)
Dept: ADMINISTRATIVE | Facility: HOSPITAL | Age: 75
End: 2020-11-17

## 2020-12-22 ENCOUNTER — HISTORICAL (OUTPATIENT)
Dept: ADMINISTRATIVE | Facility: HOSPITAL | Age: 75
End: 2020-12-22

## 2020-12-22 LAB
ABS NEUT (OLG): 3.02 X10(3)/MCL (ref 2.1–9.2)
ALBUMIN SERPL-MCNC: 4.2 GM/DL (ref 3.4–4.8)
ALBUMIN/GLOB SERPL: 1.6 RATIO (ref 1.1–2)
ALP SERPL-CCNC: 69 UNIT/L (ref 40–150)
ALT SERPL-CCNC: 13 UNIT/L (ref 0–55)
APPEARANCE, UA: CLEAR
AST SERPL-CCNC: 21 UNIT/L (ref 5–34)
BACTERIA SPEC CULT: ABNORMAL /HPF
BASOPHILS # BLD AUTO: 0 X10(3)/MCL (ref 0–0.2)
BASOPHILS NFR BLD AUTO: 1 %
BILIRUB SERPL-MCNC: 0.4 MG/DL
BILIRUB UR QL STRIP: NEGATIVE
BILIRUBIN DIRECT+TOT PNL SERPL-MCNC: 0.2 MG/DL (ref 0–0.5)
BILIRUBIN DIRECT+TOT PNL SERPL-MCNC: 0.2 MG/DL (ref 0–0.8)
BUN SERPL-MCNC: 16.8 MG/DL (ref 9.8–20.1)
CALCIUM SERPL-MCNC: 9.2 MG/DL (ref 8.4–10.2)
CHLORIDE SERPL-SCNC: 102 MMOL/L (ref 98–107)
CHOLEST SERPL-MCNC: 170 MG/DL
CHOLEST/HDLC SERPL: 4 {RATIO} (ref 0–5)
CO2 SERPL-SCNC: 31 MMOL/L (ref 23–31)
COLOR UR: YELLOW
CREAT SERPL-MCNC: 0.71 MG/DL (ref 0.55–1.02)
EOSINOPHIL # BLD AUTO: 0.1 X10(3)/MCL (ref 0–0.9)
EOSINOPHIL NFR BLD AUTO: 2 %
ERYTHROCYTE [DISTWIDTH] IN BLOOD BY AUTOMATED COUNT: 12.2 % (ref 11.5–17)
EST. AVERAGE GLUCOSE BLD GHB EST-MCNC: 134.1 MG/DL
GLOBULIN SER-MCNC: 2.6 GM/DL (ref 2.4–3.5)
GLUCOSE (UA): NEGATIVE
GLUCOSE SERPL-MCNC: 117 MG/DL (ref 82–115)
HBA1C MFR BLD: 6.3 %
HCT VFR BLD AUTO: 42.5 % (ref 37–47)
HDLC SERPL-MCNC: 40 MG/DL (ref 35–60)
HGB BLD-MCNC: 13.6 GM/DL (ref 12–16)
HGB UR QL STRIP: NEGATIVE
KETONES UR QL STRIP: NEGATIVE
LDLC SERPL CALC-MCNC: 89 MG/DL (ref 50–140)
LEUKOCYTE ESTERASE UR QL STRIP: ABNORMAL
LYMPHOCYTES # BLD AUTO: 1.3 X10(3)/MCL (ref 0.6–4.6)
LYMPHOCYTES NFR BLD AUTO: 26 %
MCH RBC QN AUTO: 31.7 PG (ref 27–31)
MCHC RBC AUTO-ENTMCNC: 32 GM/DL (ref 33–36)
MCV RBC AUTO: 99.1 FL (ref 80–94)
MONOCYTES # BLD AUTO: 0.6 X10(3)/MCL (ref 0.1–1.3)
MONOCYTES NFR BLD AUTO: 12 %
NEUTROPHILS # BLD AUTO: 3.02 X10(3)/MCL (ref 2.1–9.2)
NEUTROPHILS NFR BLD AUTO: 59 %
NITRITE UR QL STRIP: NEGATIVE
PH UR STRIP: 7.5 [PH] (ref 5–9)
PLATELET # BLD AUTO: 181 X10(3)/MCL (ref 130–400)
PMV BLD AUTO: 10.2 FL (ref 9.4–12.4)
POTASSIUM SERPL-SCNC: 4.3 MMOL/L (ref 3.5–5.1)
PROT SERPL-MCNC: 6.8 GM/DL (ref 5.8–7.6)
PROT UR QL STRIP: NEGATIVE
RBC # BLD AUTO: 4.29 X10(6)/MCL (ref 4.2–5.4)
RBC #/AREA URNS HPF: ABNORMAL /[HPF]
SODIUM SERPL-SCNC: 139 MMOL/L (ref 136–145)
SP GR UR STRIP: 1.02 (ref 1–1.03)
SQUAMOUS EPITHELIAL, UA: ABNORMAL
T4 FREE SERPL-MCNC: 1.01 NG/DL (ref 0.7–1.48)
TRIGL SERPL-MCNC: 203 MG/DL (ref 37–140)
TSH SERPL-ACNC: 1.04 UIU/ML (ref 0.35–4.94)
UROBILINOGEN UR STRIP-ACNC: 0.2
VLDLC SERPL CALC-MCNC: 41 MG/DL
WBC # SPEC AUTO: 5.1 X10(3)/MCL (ref 4.5–11.5)
WBC #/AREA URNS HPF: ABNORMAL /[HPF]

## 2020-12-29 ENCOUNTER — HISTORICAL (OUTPATIENT)
Dept: ENDOSCOPY | Facility: HOSPITAL | Age: 75
End: 2020-12-29

## 2021-02-04 ENCOUNTER — HISTORICAL (OUTPATIENT)
Dept: ADMINISTRATIVE | Facility: HOSPITAL | Age: 76
End: 2021-02-04

## 2021-02-04 LAB
ABS NEUT (OLG): 3.26 X10(3)/MCL (ref 2.1–9.2)
ALBUMIN SERPL-MCNC: 4.1 GM/DL (ref 3.4–4.8)
ALBUMIN/GLOB SERPL: 1.6 RATIO (ref 1.1–2)
ALP SERPL-CCNC: 73 UNIT/L (ref 40–150)
ALT SERPL-CCNC: 16 UNIT/L (ref 0–55)
AST SERPL-CCNC: 26 UNIT/L (ref 5–34)
BASOPHILS # BLD AUTO: 0 X10(3)/MCL (ref 0–0.2)
BASOPHILS NFR BLD AUTO: 0.4 %
BILIRUB SERPL-MCNC: 0.6 MG/DL
BILIRUBIN DIRECT+TOT PNL SERPL-MCNC: 0.2 MG/DL (ref 0–0.5)
BILIRUBIN DIRECT+TOT PNL SERPL-MCNC: 0.4 MG/DL (ref 0–0.8)
BUN SERPL-MCNC: 15.7 MG/DL (ref 9.8–20.1)
CALCIUM SERPL-MCNC: 9.7 MG/DL (ref 8.4–10.2)
CHLORIDE SERPL-SCNC: 99 MMOL/L (ref 98–107)
CO2 SERPL-SCNC: 30 MMOL/L (ref 23–31)
CREAT SERPL-MCNC: 0.62 MG/DL (ref 0.55–1.02)
EOSINOPHIL # BLD AUTO: 0.1 X10(3)/MCL (ref 0–0.9)
EOSINOPHIL NFR BLD AUTO: 2.1 %
ERYTHROCYTE [DISTWIDTH] IN BLOOD BY AUTOMATED COUNT: 12.1 % (ref 11.5–17)
EST CREAT CLEARANCE SER (OHS): 56.32 ML/MIN
GLOBULIN SER-MCNC: 2.5 GM/DL (ref 2.4–3.5)
GLUCOSE SERPL-MCNC: 86 MG/DL (ref 82–115)
HCT VFR BLD AUTO: 41.3 % (ref 37–47)
HGB BLD-MCNC: 13.5 GM/DL (ref 12–16)
LYMPHOCYTES # BLD AUTO: 1.5 X10(3)/MCL (ref 0.6–4.6)
LYMPHOCYTES NFR BLD AUTO: 26.5 %
MCH RBC QN AUTO: 31 PG (ref 27–31)
MCHC RBC AUTO-ENTMCNC: 32.7 GM/DL (ref 33–36)
MCV RBC AUTO: 94.9 FL (ref 80–94)
MONOCYTES # BLD AUTO: 0.7 X10(3)/MCL (ref 0.1–1.3)
MONOCYTES NFR BLD AUTO: 13.1 %
NEUTROPHILS # BLD AUTO: 3.3 X10(3)/MCL (ref 2.1–9.2)
NEUTROPHILS NFR BLD AUTO: 57.9 %
PLATELET # BLD AUTO: 155 X10(3)/MCL (ref 130–400)
PMV BLD AUTO: 9.2 FL (ref 9.4–12.4)
POTASSIUM SERPL-SCNC: 4.3 MMOL/L (ref 3.5–5.1)
PROT SERPL-MCNC: 6.6 GM/DL (ref 5.8–7.6)
RBC # BLD AUTO: 4.35 X10(6)/MCL (ref 4.2–5.4)
SODIUM SERPL-SCNC: 141 MMOL/L (ref 136–145)
WBC # SPEC AUTO: 5.6 X10(3)/MCL (ref 4.5–11.5)

## 2021-03-25 ENCOUNTER — HISTORICAL (OUTPATIENT)
Dept: RADIOLOGY | Facility: HOSPITAL | Age: 76
End: 2021-03-25

## 2021-04-27 ENCOUNTER — HISTORICAL (OUTPATIENT)
Dept: HEMATOLOGY/ONCOLOGY | Facility: CLINIC | Age: 76
End: 2021-04-27

## 2021-04-27 LAB
ABS NEUT (OLG): 4.06 X10(3)/MCL (ref 2.1–9.2)
ALBUMIN SERPL-MCNC: 4 GM/DL (ref 3.4–4.8)
ALP SERPL-CCNC: 71 UNIT/L (ref 40–150)
ALT SERPL-CCNC: 15 UNIT/L (ref 0–55)
ANION GAP SERPL CALC-SCNC: 12 MMOL/L
AST SERPL-CCNC: 24 UNIT/L (ref 5–34)
BASOPHILS # BLD AUTO: 0 X10(3)/MCL (ref 0–0.2)
BASOPHILS NFR BLD AUTO: 0.2 %
BILIRUB SERPL-MCNC: 0.4 MG/DL
BILIRUBIN DIRECT+TOT PNL SERPL-MCNC: 0.2 MG/DL (ref 0–0.5)
BILIRUBIN DIRECT+TOT PNL SERPL-MCNC: 0.2 MG/DL (ref 0–0.8)
BUN SERPL-MCNC: 20 MG/DL (ref 8–26)
CHLORIDE SERPL-SCNC: 100 MMOL/L (ref 98–109)
CREAT SERPL-MCNC: 0.7 MG/DL (ref 0.6–1.3)
EOSINOPHIL # BLD AUTO: 0.1 X10(3)/MCL (ref 0–0.9)
EOSINOPHIL NFR BLD AUTO: 1.7 %
ERYTHROCYTE [DISTWIDTH] IN BLOOD BY AUTOMATED COUNT: 12.1 % (ref 11.5–17)
GLUCOSE SERPL-MCNC: 141 MG/DL (ref 70–105)
HCT VFR BLD AUTO: 38.7 % (ref 37–47)
HCT VFR BLD CALC: 40 % (ref 38–51)
HGB BLD-MCNC: 12.8 GM/DL (ref 12–16)
HGB BLD-MCNC: 13.6 MG/DL (ref 12–17)
LIVER PROFILE INTERP: NORMAL
LYMPHOCYTES # BLD AUTO: 1.5 X10(3)/MCL (ref 0.6–4.6)
LYMPHOCYTES NFR BLD AUTO: 23.5 %
MCH RBC QN AUTO: 32 PG (ref 27–31)
MCHC RBC AUTO-ENTMCNC: 33.1 GM/DL (ref 33–36)
MCV RBC AUTO: 96.8 FL (ref 80–94)
MONOCYTES # BLD AUTO: 0.6 X10(3)/MCL (ref 0.1–1.3)
MONOCYTES NFR BLD AUTO: 10.1 %
NEUTROPHILS # BLD AUTO: 4.1 X10(3)/MCL (ref 2.1–9.2)
NEUTROPHILS NFR BLD AUTO: 64.3 %
PLATELET # BLD AUTO: 161 X10(3)/MCL (ref 130–400)
PMV BLD AUTO: 9.5 FL (ref 9.4–12.4)
POC IONIZED CALCIUM: 1.16 MMOL/L (ref 1.12–1.32)
POC TCO2: 32 MMOL/L (ref 24–29)
POTASSIUM BLD-SCNC: 3.7 MMOL/L (ref 3.5–4.9)
PROT SERPL-MCNC: 6.6 GM/DL (ref 5.8–7.6)
RBC # BLD AUTO: 4 X10(6)/MCL (ref 4.2–5.4)
SODIUM BLD-SCNC: 140 MMOL/L (ref 138–146)
WBC # SPEC AUTO: 6.3 X10(3)/MCL (ref 4.5–11.5)

## 2021-06-17 ENCOUNTER — HISTORICAL (OUTPATIENT)
Dept: RADIOLOGY | Facility: HOSPITAL | Age: 76
End: 2021-06-17

## 2021-07-08 ENCOUNTER — HISTORICAL (OUTPATIENT)
Dept: ADMINISTRATIVE | Facility: HOSPITAL | Age: 76
End: 2021-07-08

## 2021-07-08 LAB
ABS NEUT (OLG): 3.58 X10(3)/MCL (ref 2.1–9.2)
ALBUMIN SERPL-MCNC: 4 GM/DL (ref 3.4–4.8)
ALBUMIN/GLOB SERPL: 1.2 RATIO (ref 1.1–2)
ALP SERPL-CCNC: 75 UNIT/L (ref 40–150)
ALT SERPL-CCNC: 15 UNIT/L (ref 0–55)
APPEARANCE, UA: CLEAR
AST SERPL-CCNC: 26 UNIT/L (ref 5–34)
BACTERIA SPEC CULT: NORMAL /HPF
BASOPHILS # BLD AUTO: 0 X10(3)/MCL (ref 0–0.2)
BASOPHILS NFR BLD AUTO: 0 %
BILIRUB SERPL-MCNC: 0.6 MG/DL
BILIRUB UR QL STRIP: NEGATIVE
BILIRUBIN DIRECT+TOT PNL SERPL-MCNC: 0.2 MG/DL (ref 0–0.5)
BILIRUBIN DIRECT+TOT PNL SERPL-MCNC: 0.4 MG/DL (ref 0–0.8)
BUN SERPL-MCNC: 14.7 MG/DL (ref 9.8–20.1)
CALCIUM SERPL-MCNC: 9.9 MG/DL (ref 8.4–10.2)
CHLORIDE SERPL-SCNC: 102 MMOL/L (ref 98–107)
CHOLEST SERPL-MCNC: 182 MG/DL
CHOLEST/HDLC SERPL: 4 {RATIO} (ref 0–5)
CO2 SERPL-SCNC: 30 MMOL/L (ref 23–31)
COLOR UR: YELLOW
CREAT SERPL-MCNC: 0.62 MG/DL (ref 0.55–1.02)
CREAT UR-MCNC: 77.5 MG/DL (ref 45–106)
DEPRECATED CALCIDIOL+CALCIFEROL SERPL-MC: 51.8 NG/ML (ref 30–80)
EOSINOPHIL # BLD AUTO: 0.1 X10(3)/MCL (ref 0–0.9)
EOSINOPHIL NFR BLD AUTO: 2 %
ERYTHROCYTE [DISTWIDTH] IN BLOOD BY AUTOMATED COUNT: 12.5 % (ref 11.5–17)
EST. AVERAGE GLUCOSE BLD GHB EST-MCNC: 139.8 MG/DL
GLOBULIN SER-MCNC: 3.2 GM/DL (ref 2.4–3.5)
GLUCOSE (UA): NEGATIVE
GLUCOSE SERPL-MCNC: 108 MG/DL (ref 82–115)
HBA1C MFR BLD: 6.5 %
HCT VFR BLD AUTO: 41.6 % (ref 37–47)
HDLC SERPL-MCNC: 41 MG/DL (ref 35–60)
HGB BLD-MCNC: 13.8 GM/DL (ref 12–16)
HGB UR QL STRIP: NEGATIVE
KETONES UR QL STRIP: NEGATIVE
LDLC SERPL CALC-MCNC: 93 MG/DL (ref 50–140)
LEUKOCYTE ESTERASE UR QL STRIP: NEGATIVE
LYMPHOCYTES # BLD AUTO: 1.4 X10(3)/MCL (ref 0.6–4.6)
LYMPHOCYTES NFR BLD AUTO: 25 %
MCH RBC QN AUTO: 32 PG (ref 27–31)
MCHC RBC AUTO-ENTMCNC: 33.2 GM/DL (ref 33–36)
MCV RBC AUTO: 96.5 FL (ref 80–94)
MICROALBUMIN UR-MCNC: 12.3 UG/ML
MICROALBUMIN/CREAT RATIO PNL UR: 15.9 MG/GM CR (ref 0–30)
MONOCYTES # BLD AUTO: 0.7 X10(3)/MCL (ref 0.1–1.3)
MONOCYTES NFR BLD AUTO: 11 %
NEUTROPHILS # BLD AUTO: 3.58 X10(3)/MCL (ref 2.1–9.2)
NEUTROPHILS NFR BLD AUTO: 61 %
NITRITE UR QL STRIP: NEGATIVE
PH UR STRIP: 7.5 [PH] (ref 5–9)
PLATELET # BLD AUTO: 170 X10(3)/MCL (ref 130–400)
PMV BLD AUTO: 10.6 FL (ref 9.4–12.4)
POTASSIUM SERPL-SCNC: 4.6 MMOL/L (ref 3.5–5.1)
PROT SERPL-MCNC: 7.2 GM/DL (ref 5.8–7.6)
PROT UR QL STRIP: NEGATIVE
RBC # BLD AUTO: 4.31 X10(6)/MCL (ref 4.2–5.4)
RBC #/AREA URNS HPF: NORMAL /[HPF]
SODIUM SERPL-SCNC: 140 MMOL/L (ref 136–145)
SP GR UR STRIP: 1.01 (ref 1–1.03)
SQUAMOUS EPITHELIAL, UA: NORMAL /HPF
T4 FREE SERPL-MCNC: 0.9 NG/DL (ref 0.7–1.48)
TRIGL SERPL-MCNC: 242 MG/DL (ref 37–140)
TSH SERPL-ACNC: 0.91 UIU/ML (ref 0.35–4.94)
UA WBC MAN: NORMAL
UROBILINOGEN UR STRIP-ACNC: 0.2
VLDLC SERPL CALC-MCNC: 48 MG/DL
WBC # SPEC AUTO: 5.9 X10(3)/MCL (ref 4.5–11.5)

## 2021-09-30 ENCOUNTER — HISTORICAL (OUTPATIENT)
Dept: RADIOLOGY | Facility: HOSPITAL | Age: 76
End: 2021-09-30

## 2021-11-16 ENCOUNTER — HISTORICAL (OUTPATIENT)
Dept: HEMATOLOGY/ONCOLOGY | Facility: CLINIC | Age: 76
End: 2021-11-16

## 2022-01-06 ENCOUNTER — HISTORICAL (OUTPATIENT)
Dept: ADMINISTRATIVE | Facility: HOSPITAL | Age: 77
End: 2022-01-06

## 2022-01-06 LAB
ABS NEUT (OLG): 3.45 X10(3)/MCL (ref 2.1–9.2)
ALBUMIN SERPL-MCNC: 3.9 GM/DL (ref 3.4–4.8)
ALBUMIN/GLOB SERPL: 1.1 RATIO (ref 1.1–2)
ALP SERPL-CCNC: 69 UNIT/L (ref 40–150)
ALT SERPL-CCNC: 17 UNIT/L (ref 0–55)
APPEARANCE, UA: CLEAR
AST SERPL-CCNC: 26 UNIT/L (ref 5–34)
BACTERIA SPEC CULT: ABNORMAL /HPF
BASOPHILS # BLD AUTO: 0 X10(3)/MCL (ref 0–0.2)
BASOPHILS NFR BLD AUTO: 1 %
BILIRUB SERPL-MCNC: 0.7 MG/DL
BILIRUB UR QL STRIP: NEGATIVE
BILIRUBIN DIRECT+TOT PNL SERPL-MCNC: 0.3 MG/DL (ref 0–0.5)
BILIRUBIN DIRECT+TOT PNL SERPL-MCNC: 0.4 MG/DL (ref 0–0.8)
BUN SERPL-MCNC: 19.5 MG/DL (ref 9.8–20.1)
CALCIUM SERPL-MCNC: 10 MG/DL (ref 8.7–10.5)
CHLORIDE SERPL-SCNC: 101 MMOL/L (ref 98–107)
CHOLEST SERPL-MCNC: 176 MG/DL
CHOLEST/HDLC SERPL: 4 {RATIO} (ref 0–5)
CO2 SERPL-SCNC: 28 MMOL/L (ref 23–31)
COLOR UR: YELLOW
CREAT SERPL-MCNC: 0.64 MG/DL (ref 0.55–1.02)
CREAT UR-MCNC: 53.6 MG/DL (ref 45–106)
DEPRECATED CALCIDIOL+CALCIFEROL SERPL-MC: 47.1 NG/ML (ref 30–80)
EOSINOPHIL # BLD AUTO: 0.1 X10(3)/MCL (ref 0–0.9)
EOSINOPHIL NFR BLD AUTO: 2 %
ERYTHROCYTE [DISTWIDTH] IN BLOOD BY AUTOMATED COUNT: 13.2 % (ref 11.5–17)
EST. AVERAGE GLUCOSE BLD GHB EST-MCNC: 128.4 MG/DL
GLOBULIN SER-MCNC: 3.4 GM/DL (ref 2.4–3.5)
GLUCOSE (UA): NEGATIVE
GLUCOSE SERPL-MCNC: 120 MG/DL (ref 82–115)
HBA1C MFR BLD: 6.1 %
HCT VFR BLD AUTO: 40.9 % (ref 37–47)
HDLC SERPL-MCNC: 42 MG/DL (ref 35–60)
HGB BLD-MCNC: 13.4 GM/DL (ref 12–16)
HGB UR QL STRIP: NEGATIVE
KETONES UR QL STRIP: NEGATIVE
LDLC SERPL CALC-MCNC: 87 MG/DL (ref 50–140)
LEUKOCYTE ESTERASE UR QL STRIP: ABNORMAL
LYMPHOCYTES # BLD AUTO: 1.5 X10(3)/MCL (ref 0.6–4.6)
LYMPHOCYTES NFR BLD AUTO: 26 %
MCH RBC QN AUTO: 31.8 PG (ref 27–31)
MCHC RBC AUTO-ENTMCNC: 32.8 GM/DL (ref 33–36)
MCV RBC AUTO: 97.1 FL (ref 80–94)
MICROALBUMIN UR-MCNC: 15.8 UG/ML
MICROALBUMIN/CREAT RATIO PNL UR: 29.5 MG/GM CR (ref 0–30)
MONOCYTES # BLD AUTO: 0.6 X10(3)/MCL (ref 0.1–1.3)
MONOCYTES NFR BLD AUTO: 10 %
NEUTROPHILS # BLD AUTO: 3.45 X10(3)/MCL (ref 2.1–9.2)
NEUTROPHILS NFR BLD AUTO: 60 %
NITRITE UR QL STRIP: NEGATIVE
PH UR STRIP: 7 [PH] (ref 5–9)
PLATELET # BLD AUTO: 157 X10(3)/MCL (ref 130–400)
PMV BLD AUTO: 10.5 FL (ref 9.4–12.4)
POTASSIUM SERPL-SCNC: 4.4 MMOL/L (ref 3.5–5.1)
PROT SERPL-MCNC: 7.3 GM/DL (ref 5.8–7.6)
PROT UR QL STRIP: NEGATIVE
RBC # BLD AUTO: 4.21 X10(6)/MCL (ref 4.2–5.4)
RBC #/AREA URNS HPF: ABNORMAL /[HPF]
SODIUM SERPL-SCNC: 141 MMOL/L (ref 136–145)
SP GR UR STRIP: 1.02 (ref 1–1.03)
SQUAMOUS EPITHELIAL, UA: ABNORMAL /HPF (ref 0–4)
T4 FREE SERPL-MCNC: 0.87 NG/DL (ref 0.7–1.48)
TRIGL SERPL-MCNC: 235 MG/DL (ref 37–140)
TSH SERPL-ACNC: 0.91 UIU/ML (ref 0.35–4.94)
UROBILINOGEN UR STRIP-ACNC: 0.2
VLDLC SERPL CALC-MCNC: 47 MG/DL
WBC # SPEC AUTO: 5.7 X10(3)/MCL (ref 4.5–11.5)
WBC #/AREA URNS AUTO: 11 /HPF (ref 0–3)
WBC #/AREA URNS HPF: 11 /HPF (ref 0–3)

## 2022-03-29 ENCOUNTER — HISTORICAL (OUTPATIENT)
Dept: ADMINISTRATIVE | Facility: HOSPITAL | Age: 77
End: 2022-03-29

## 2022-04-10 ENCOUNTER — HISTORICAL (OUTPATIENT)
Dept: ADMINISTRATIVE | Facility: HOSPITAL | Age: 77
End: 2022-04-10
Payer: MEDICARE

## 2022-04-26 VITALS
BODY MASS INDEX: 24.92 KG/M2 | DIASTOLIC BLOOD PRESSURE: 64 MMHG | SYSTOLIC BLOOD PRESSURE: 136 MMHG | OXYGEN SATURATION: 98 % | HEIGHT: 61 IN | WEIGHT: 132 LBS

## 2022-04-30 NOTE — H&P
HISTORY AND PHYSICAL:  Ms. Herrera is a 75-year-old [place] native known to me from previous evaluation.  I took care of her mother years ago with pancreatic cancer.  The patient last underwent screening colonoscopy in December 2010, a study remarkable only for diverticulosis.  She is, today, scheduled for repeat screening in that regard.   She does have a history of pyrosis and problematic dysphagia and has been dilated on more than one occasion.  She did have biopsies that returned positive for Murguia's, the shortest of segments.  She last underwent surveillance in that regard in February 2016.  She currently complains of progressive constipation, but denies significant upper gastrointestinal concerns.    ALLERGIES:  Listed as ACE inhibitors and Macrobid.    MEDICATIONS:    1. Amitriptyline.  2. Metformin.  3. Levothyroxine.  4. Hydrochlorothiazide.  5. Losartan.  6. Nexium.   7. Tizanidine.    8. Mobic.  9. Daily aspirin.  10. Letrozole.  11. Lexapro.  12. Amlodipine.  13. Rosuvastatin.   14. Vitamin D.  15. Multivitamin.    PAST MEDICAL HISTORY:  Diabetes mellitus, hypertension, hyperlipidemia, Murguia's esophagus, short segment.    PAST SURGICAL HISTORY:  She has had an abdominal hysterectomy, back surgery, and septoplasty.    SOCIAL HISTORY:  She is a  with healthy children, though she lost 1 son in a motor vehicle accident.  She still smokes a bit.  Does not drink to excess.    FAMILY HISTORY:  Remarkable for her mother's pancreatic cancer and father's prostate cancer.  No family history for colonic neoplasia.    PHYSICAL EXAM:  VITAL SIGNS:  Afebrile.  Blood pressure 158/57, pulse 87, respirations 14.     GENERAL:  She appears younger than her stated age.   HEART:  Regular.   LUNGS:  Clear.   ABDOMEN:  Soft and nontender.   NEUROLOGIC:  Alert and oriented.  Motor grossly intact.    DISCUSSION:  75-year-old with a family history of pancreatic cancer here for screening colonoscopy 10 years from  her last such exam.  Hopefully, we can reassure her today and let that serve as her last colonoscopy.  Given her Murguia's history, albeit short segment, she might ought to consider an upper exam down the line.        ______________________________  Kennedy Oleary MD    JCB/UB  DD:  12/29/2020  Time:  10:53AM  DT:  12/29/2020  Time:  11:07AM  Job #:  [id number]    The H&P was reviewed, the patient was examined, and the following changes to the patients condition are noted:  ______________________________________________________________________________  ______________________________________________________________________________  ______________________________________________________________________________  [  ] No changes to the patient's condition:      ______________________________                                             ___________________  PHYSICIAN SIGNATURE                                                             DATE/TIME    cc: MD Kennedy Doherty MD

## 2022-04-30 NOTE — OP NOTE
DATE OF SURGERY:    12/29/2020    SURGEON:  Kennedy Oleary MD    PROCEDURE:  Colonoscopy and terminal ileoscopy.    PREOPERATIVE DIAGNOSIS:  Screening exam, 10 years from last colonoscopy, with a family history of pancreatic cancer.    POSTOPERATIVE DIAGNOSES:    1. Adequate sedation with Diprivan per Anesthesia.  2. Good colon prep, left-sided diverticular disease, otherwise benign exam to the cecum and into a normal terminal ileum.    PROCEDURE IN DETAIL:  The patient consented for the procedure after a detailed explanation of the risks and complications including bleeding, perforation and adverse reaction to sedation.  The patient was placed in the left lateral decubitus position.  Initially we examined the perineum and performed a digital exam.     The video colonoscope was inserted in the rectal vault and passed under direct vision.  We retroflexed to allow visualization of the anal verge and then de-retroflexed, traversing the sigmoid colon, descending colon and splenic flexure, transverse colon, hepatic flexure, ascending colon and entering the cecum.  The cecum was identified by visualizing the appendiceal orifice and the ileocecal valve.  The scope was withdrawn with reexamination of the mucosa and with biopsies, polypectomies and specimens removed as outlined below.     The scope was removed in its entirety and the patient tolerated the procedure well.     The patient's findings are as detailed.  She had a good prep.  She had left-sided diverticular disease, but her exam was otherwise straightforward and normal to the cecum visualizing the appendiceal orifice and ileocecal valve and the last several centimeters of her terminal ileum.  Slow withdrawal found no other discrete mucosal abnormalities.    DISCUSSION AND DISCHARGE SUMMARY:  After her procedure was completed, we discussed our findings with the patient and her attendant.  We examined her and found her stable for discharge.  We allowed  that she could resume her usual diet today and activities tomorrow.     Given the paucity of findings on today's exam, we will let this serve as her last study.  Her children should be screened at 50.     She did have a very short segment Murguia's evident on biopsy in the remote past.  Her last exam was reassuring in 2016.  We will let her upper symptoms determine whether she wants to undergo surveillance in that regard.  I did encourage her to quit smoking, her primary advice.        ______________________________  MD FABRICE iRley/UB  DD:  12/29/2020  Time:  11:20AM  DT:  12/29/2020  Time:  11:31AM  Job #:  [id number]    cc: Mark Lawton MD

## 2022-05-02 NOTE — HISTORICAL [facility]
This is a historical note converted from [place]. Formatting and pictures may have been removed.  Please reference [place] for original formatting and attached multimedia. Chief Complaint  Left knee pain/swelling. No injury or surgery. Knee has been hurting for a couple of weeks.  History of Present Illness  74-year-old female presents today for follow-up of bilateral knee pain. ?Patient mastectomy about 2 weeks ago. ?Since that time she is had worsening pain to both of her knees. ?Notes pain worse in the left knee than the right. ?Denies any radiations distally.? Worse with ambulation. ?Improved slowly by rest.? She has had some mild knee pain in the past although this is much more severe. ?Denies any injury. ?Does have some popping?and feels a catch in her left knee when she ambulates.  Review of Systems  Denies fevers, chills, chest pain, shortness of breath. Comprehensive review of systems performed and otherwise negative except as noted in HPI.  Physical Exam  Vitals & Measurements  HR:?101(Peripheral)? BP:?114/54?  HT:?156?cm? WT:?57.5?kg? BMI:?23.66?  General: No acute distress, alert and oriented, healthy appearing?  HEENT: Head is atraumatic, mucous membranes are moist  Neck: Supples, no JVD  Cardiovascular: Palpable dorsalis pedis and posterior tibial pulses, regular rate and rhythm to those pulses  Lungs: Breathing non-labored  Skin: no rashes appreciated  Neurologic: Can flex and extend knees, ankles, and toes. Sensation is grossly intact  ?  Bilateral knees:  Patient with slight valgus ROM to the right knee that is correctable. ?Patient with a fixed varus deformity left knee. ?Range of motion is from 5-110.? Crepitus to patellofemoral joints to both knees. ?Tenderness to the medial joint line on the left knee. ?Tenderness to lateral joint on the right knee with a palpable?osteophyte.  Assessment/Plan  1.?Primary osteoarthritis of left knee?M17.12  ?Patient with end-stage arthritis to both knees. ?She has  varus arthritis to the left and valgus arthritis to the right. ?Discussed all treatment options.? Given her recent surgery we will try cortisone injection to calm this down.? Once she has gotten over her mastectomy, I think should be a good candidate for knee arthroplasty. ?We will start with conservative measures.  ?  After verbal consent was obtained the patients left knee was prepped with Betadine and anesthetized with ethyl chloride. The left knee joint was then injected under sterile technique with 2 mL of 2% lidocaine and 12 mg betamethasone. It was dressed with a Band-Aid. The patient received good relief from the injection and was able to ambulate normally from clinic.  Ordered:  betamethasone, 24 mg, Intra-Articular, Once, first dose 07/23/19 16:00:00 CDT, stop date 07/23/19 16:00:00 CDT  Lidocaine inj., 4 mL, Intra-Articular, Once, first dose 07/23/19 15:01:00 CDT, stop date 07/23/19 15:01:00 CDT  Clinic Follow up, *Est. 09/03/19 3:00:00 CDT, Order for future visit, Primary osteoarthritis of left knee  Primary osteoarthritis of right knee, Orthopaedics  Office/Outpatient Visit Level 3 Established [id number] PC, Primary osteoarthritis of left knee  Primary osteoarthritis of right knee, Orthopaedics Clinic, 07/23/19 15:00:00 CDT  ?  2.?Primary osteoarthritis of right knee?M17.11  After verbal consent was obtained the patients right knee was prepped with Betadine and anesthetized with ethyl chloride. The right knee joint was then injected under sterile technique with 2 mL of 2% lidocaine and betamethasone it was dressed with a Band-Aid. The patient received good relief from the injection and was able to ambulate normally from clinic.  Ordered:  betamethasone, 24 mg, Intra-Articular, Once, first dose 07/23/19 16:00:00 CDT, stop date 07/23/19 16:00:00 CDT  Lidocaine inj., 4 mL, Intra-Articular, Once, first dose 07/23/19 15:01:00 CDT, stop date 07/23/19 15:01:00 CDT  Clinic Follow up, *Est. 09/03/19 3:00:00  CDT, Order for future visit, Primary osteoarthritis of left knee  Primary osteoarthritis of right knee, Orthopaedics  Office/Outpatient Visit Level 3 Established [id number] PC, Primary osteoarthritis of left knee  Primary osteoarthritis of right knee, [facility], 07/23/19 15:00:00 CDT  ?  Orders:  Asp/Inj (Bilateral) Major Jnt/Bursa [id number], 07/23/19 15:01:00 [facility], 07/23/19 15:01:00 CDT  Referrals  Clinic Follow up, *Est. 09/03/19 3:00:00 CDT, Order for future visit, Primary osteoarthritis of left knee  [facility]   Problem List/Past Medical History  Ongoing  Diabetes  Foot pain  GERD (gastroesophageal reflux disease)  Hyperlipidemia  Hypothyroidism(  Confirmed  )  Insomnia  Knee pain  Needs flu shot  Tobacco user  Vitamin D deficiency  Well adult exam  Historical  Breast cancer  Diabetes mellitus type 2  High cholesterol  Hypertension  Thyroid  Procedure/Surgical History  Biopsy or excision of lymph node(s); open, deep axillary node(s) (07/08/2019)  Biopsy Sentinal Node (Left) (07/08/2019)  Excision of Left Axillary Lymphatic, Open Approach, Diagnostic (07/08/2019)  Mastectomy Simple (Left) (07/08/2019)  Mastectomy, simple, complete (07/08/2019)  Resection of Left Breast, Open Approach (07/08/2019)  Biopsy, breast, with placement of breast localization device(s) (eg, clip, metallic pellet), when performed, and imaging of the biopsy specimen, when performed, percutaneous; first lesion, including ultrasound guidance (05/22/2019)  Excision of Left Breast, Percutaneous Approach, Diagnostic (05/22/2019)  Neck (2013)  Cataracts (2011)  Colonoscopy (12/13/2010)  Hysterectomy (2000)  Deviated septum (1992)   Medications  amitriptyline 25 mg oral tablet, 25 mg= 1 tab(s), Oral, Once a day (at bedtime), 5 refills  aspirin 81 mg oral tablet  betamethasone valerate 0.1% topical cream, 1 che, TOP, BID,? ?Not taking  biest 70/30  1.25  Celestone, 24 mg, Intra-Articular, Once  fenofibrate 200 mg oral capsule, 200 mg= 1 cap(s), Oral, Daily, 5 refills,? ?Not taking  Fish Oil oral capsule, 1 cap(s), Oral, Daily  hydrochlorothiazide-lisinopril 12.5 mg-10 mg oral tablet, 1 tab(s), Oral, Daily, 3 refills  levothyroxine 88 mcg (0.088 mg) oral tablet, See Instructions, 3 refills  Lexapro 20 mg oral tablet, 20 mg= 1 tab(s), Oral, Daily, 5 refills  lidocaine 2% injectable solution, 4 mL, Intra-Articular, Once  metformin 500 mg oral tablet, 500 mg= 1 tab(s), Oral, Daily, 3 refills  multivitamin with minerals (Adult Tab), 1 tab(s), Oral, Daily  NexIUM 40 mg oral delayed release capsule, 40 mg= 1 cap(s), Oral, Daily, 3 refills  rosuvastatin 10 mg oral tablet, See Instructions, 3 refills  TheraCran One oral capsule, 1 tab(s), Oral, Daily  traMADol 50 mg oral tablet, 50 mg= 1 tab(s), Oral, TID, PRN,? ?Not taking  Vitamin D, 1 tab(s), Oral, Daily  vitamin E 400 intl units oral capsule, 400 IntUnit= 1 cap(s), Oral, Daily  Allergies  Macrobid?(rash)  Social History  Abuse/Neglect  No, 07/08/2019  No, No, No, 06/25/2019  Alcohol  Beer, Wine, Liquor, Daily, 06/25/2019  Current, Wine, Daily, 03/26/2015  Employment/School  Retired, 03/26/2015  Exercise  Exercise frequency: 1-2 times/week., 03/26/2015  Home/Environment  Lives with Spouse., 03/26/2015  Nutrition/Health  Regular, 03/26/2015  Sexual  Sexually active: Yes., 04/07/2016  Substance Use  Tobacco  10 or more cigarettes (1/2 pack or more)/day in last 30 days, N/A, 07/23/2019  Family History  Pancreatic cancer.: Mother.  Primary malignant neoplasm of prostate: Father.  Tobacco: Negative: Mother.  Immunizations  Vaccine Date Status   influenza virus vaccine, inactivated 10/09/2018 Given   influenza virus vaccine, inactivated 10/12/2017 Given   influenza virus vaccine, inactivated 10/11/2016 Given   influenza virus vaccine, inactivated 10/08/2015 Given   influenza virus vaccine, inactivated 09/23/2014 Recorded    pneumococcal 23-polyvalent vaccine 10/01/2012 Recorded   Health Maintenance  Health Maintenance  ???Pending?(in the next year)  ??? ??OverDue  ??? ? ? ?Pneumococcal Vaccine due??and every?  ??? ? ? ?Advance Directive due??01/01/19??and every 1??year(s)  ??? ? ? ?Cognitive Screening due??01/01/19??and every 1??year(s)  ??? ? ? ?Geriatric Depression Screening due??01/01/19??and every 1??year(s)  ??? ??Due?  ??? ? ? ?ADL Screening due??07/23/19??and every 1??year(s)  ??? ? ? ?Tetanus Vaccine due??07/23/19??and every 10??year(s)  ??? ? ? ?Zoster Vaccine due??07/23/19??and every 100??year(s)  ??? ??Refused?  ??? ? ? ?Bone Density Screening due??12/30/17??and every 2??year(s)  ??? ??Due In Future?  ??? ? ? ?Smoking Cessation (Diabetes) not due until??08/15/19??and every 2??year(s)  ??? ? ? ?Diabetes Maintenance-Eye Exam not due until??09/18/19??and every 1??year(s)  ??? ? ? ?Fall Risk Assessment not due until??01/01/20??and every 1??year(s)  ??? ? ? ?Functional Assessment not due until??01/01/20??and every 1??year(s)  ??? ? ? ?Obesity Screening not due until??01/01/20??and every 1??year(s)  ??? ? ? ?Smoking Cessation not due until??01/01/20??and every 1??year(s)  ??? ? ? ?Diabetes Maintenance-Fasting Lipid Profile not due until??04/01/20??and every 1??year(s)  ??? ? ? ?Diabetes Maintenance-Urine Dipstick not due until??04/02/20??and every 1??year(s)  ??? ? ? ?Diabetes Maintenance-Microalbumin not due until??04/02/20??and every 1??year(s)  ??? ? ? ?Diabetes Maintenance-Foot Exam not due until??04/08/20??and every 1??year(s)  ??? ? ? ?Aspirin Therapy for CVD Prevention not due until??04/09/20??and every 1??year(s)  ??? ? ? ?Diabetes Maintenance-HgbA1c not due until??05/08/20??and every 1??year(s)  ??? ? ? ?Hypertension Management-Blood Pressure not due until??05/08/20??and every 1??year(s)  ??? ? ? ?Hypertension Management-BMP not due until??06/24/20??and every 1??year(s)  ??? ? ? ?Diabetes Maintenance-Serum Creatinine  not due until??06/25/20??and every 1??year(s)  ???Satisfied?(in the past 1 year)  ??? ??Satisfied?  ??? ? ? ?Aspirin Therapy for CVD Prevention on??04/09/19.??Satisfied by Iris Dueñas LPN.  ??? ? ? ?Blood Pressure Screening on??07/23/19.??Satisfied by Jessy Anglin  ??? ? ? ?Body Mass Index Check on??07/23/19.??Satisfied by Jessy Anglin  ??? ? ? ?Breast Cancer Screening on??04/16/19.??Satisfied by Janice Curry  ??? ? ? ?Depression Screening on??05/09/19.??Satisfied by Iris Dueñas LPN.  ??? ? ? ?Diabetes Maintenance-Serum Creatinine on??06/25/19.??Satisfied by Tay Riddle  ??? ? ? ?Diabetes Screening on??06/25/19.??Satisfied by Tay Riddle  ??? ? ? ?Fall Risk Assessment on??07/08/19.??Satisfied by Isatu Oropeza RN  ??? ? ? ?Functional Assessment on??07/23/19.??Satisfied by Jessy Anglin  ??? ? ? ?Hypertension Management-Blood Pressure on??07/23/19.??Satisfied by Jessy Anglin  ??? ? ? ?Influenza Vaccine on??10/09/18.??Satisfied by Iris Dueñas LPN  ??? ? ? ?Lipid Screening on??04/02/19.??Satisfied by Xiomara Lam  ??? ? ? ?Obesity Screening on??07/23/19.??Satisfied by Jessy Anglin  ??? ? ? ?Smoking Cessation on??04/09/19.??Satisfied by Iris Dueñas LPN  ??? ??Refused?  ??? ? ? ?Bone Density Screening on??04/09/19.??Recorded by Spenser LPN, Christen H.  ?  Diagnostic Results  AP lateral bilateral knees reviewed. ?Patient with end-stage arthritis to both knees?with varus deformity on the left and valgus form on the right.

## 2022-05-02 NOTE — HISTORICAL [facility]
This is a historical note converted from [place]. Formatting and pictures may have been removed.  Please reference [place] for original formatting and attached multimedia. DATE OF SERVICE: 7/8/2019  ?   SURGEON: Dr. Emelina Leonard  ?   ASSIST: None  ?   PREOPERATIVE DIAGNOSIS: Left breast invasive ductal carcinoma  ?  POSTOPERATIVE DIAGNOSIS: Left breast invasive ductal carcinoma  ?  PROCEDURE:  1. Left subareolar injection of isosulfan blue dye  2. Left simple mastectomy  3. Left axillary sentinel lymph node biopsy  ?   ANESTHESIA: LMA?+ 0.5% Bupivacaine  ?  ESTIMATED BLOOD LOSS: 15 mL  ?  FINDINGS:  1. Left breast tissue  2. Left axillary sentinel lymph nodes x4 (#3 SLN had 2 nodes)  ?  SPECIMEN:  1. Left simple mastectomy  2. Left axillary sentinel lymph node #1, hot and blue  3.Left axillary sentinel lymph node #2, hot and blue  4. Left axillary sentinel lymph node #3  ?  DRAINS: Left JONNIE drain  ?  COMPLICATIONS: None  ?  PROCEDURE INDICATION:  Marlin Herrera is a pleasant 73 year old female who presents with AJCC 8th ed clinical (anatomic/prognostic) stage IA (cTIc N0 M0) left breast invasive ductal carcinoma, grade 2, ER 99.58%, ME 47.35%, and HER2 2+ on IHC (equivocal) and negative by?FISH. The general operative procedure of mastectomy and lumpectomy were discussed in detail. The surgical complications of either procedure, i.e., pain, bleeding, hematoma, seroma, potential need for additional surgery, and infection were addressed in detail. She also spoke with Dr. Rodriguez regarding reconstructive options. She is a smoker and is not a candidate at this time. She elected to proceed with left simple mastectomy and left sentinel lymph node biopsy.  ?  PROCEDURE DESCRIPTION:  Prior to the operating room the patient was injected with Technetium-99 (TC-99) sulfur colloid by the nuclear medicine. The patient was then brought to the operating room and placed supine on the operative table. General anesthesia was induced.?3  cc?s of?isosulfan blue dye was injected in the subareolar space and gently messages. The skin of the?Left breast was prepped and draped in standard sterile surgical fashion along with the Left axilla and upper arm. A time-out was completed verifying correct patient, procedure, site, positioning and equipment prior to beginning the procedure.?  ?  A large?elliptical skin incision was made that encompassed the nipple-areola complex and the previous biopsy site on the Left. Flaps were raised in the avascular plane between subcutaneous tissue and breast tissue from the clavicle superiorly, the sternum medially, the anterior rectus sheath inferiorly, and the lateral border of the pectoralis major muscle laterally. Hemostasis was achieved in the flaps. Next, the breast tissue and underlying pectoralis fascia were excised from the pectoralis major muscle, progressing from medial to lateral. At the lateral border of the pectoralis major muscle, the breast tissue was sung laterally and a lateral pedicle identified where breast tissue gave way to fat of axilla. Prior to completely removing the breast tissue the sentinel lymph node biopsy was performed.  ?  The lateral?border of the pectoralis of the?Left axilla was excised and the axilla was entered.?Using a hand-held gamma probe ([employer])?the axilla was assessed for a sentinel lymph node. Initial counts prior to excision the lymph node measured 1200s. Blue staining was?noted at this time. The lymph node was excised and the gamma probe was placed next to it which measured 1340s. The cavity was reassessed with the hand held gamma probe and found to have a count of?1000s Two additional?lymph nodes were identified and removed. The lymph nodes were sent as specimen sentinel lymph node #1, #2,?and #3. While in the operating room pathology?reported the sentinel lymph nodes as Negative and in # 3 there were two nodes for a total of 4 nodes. No further?dissection was undertaken.?The  lateral pedicle was incised, removed and the specimen was marked. Superiorly was a short suture and laterally was a long suture.?  ?  The?cavity was?closed with interrupted 3-0 Vicryl sutures. The subdermal layers were closed?with 3-0 Vicryl and 4-0 subcuticular running skin closures.?Exofin?wound closure system was used?was applied over?the incision followed by Telfa and 4x4 gauze.  ?  The?patient was awakened from anesthesia and taken to the postanesthesia care unit in stable condition.

## 2022-05-02 NOTE — HISTORICAL [facility]
This is a historical note converted from [place]. Formatting and pictures may have been removed.  Please reference [place] for original formatting and attached multimedia. Chief Complaint  1) Tripping fall on Monday 11/12/18 Am to the left knee. swelling with bruising and difficulty ambulating 2) Coughing with mucus since 10/2018  History of Present Illness  Presents with pain to the left knee. ?States was walking?in her house accidentally tripped and fell on a hardwood floor?3 days ago.  Review of Systems  Constitutional_no fever, fatigue, weakness  Musculoskeletal_[pain to the left knee]  Integumentary_no skin rash or abnormal lesion  Neurologic_no headache, no dizziness, no weakness or numbness  Respiratory_cough  Physical Exam  Vitals & Measurements  T:?36.8? ?C (Oral)? HR:?85(Peripheral)? RR:?18? BP:?146/73? SpO2:?95%?  HT:?154?cm? HT:?154?cm? HT:?154?cm? WT:?58.2?kg? WT:?58.2?kg? WT:?58.2?kg? BMI:?24.54?  General_well-developed well-nourished in no acute distress  Eye_clear conjunctiva, eyelids normal  HENT_TMs/ear canals clear, oropharynx without erythema/exudate, oropharynx and nasal mucosal surfaces moist, no maxillary/frontal sinus tenderness to palpation  Neck_full range of motion, no thyromegaly or lymphadenopathy  Respiratory_clear to auscultation bilaterally  Cardiovascular_regular rate and rhythm without murmurs, gallops or rubs  Musculoskeletal_pain to the medial and lateral knee, mild swelling without erythema or deformity. ?Good range of motion. ?2+ radial pulse strong regular.  ?  ?  Assessment/Plan  1.?Left knee pain?M25.562,?Left knee pain?M25.562  Modify activity as necessary, gentle stretching suggested  Use either ice pack for pain relief or localized heat to promote healing as needed  Use medications either over-the-counter or prescription as prescribed/needed  Contact this clinic if not improved with this treatment plan over the next 7-14 days?or follow-up with?your orthopedic?physician?as  planned and discussed.  We will notify you of the final?radiology x-ray report result.  Ordered:  traMADol, 50 mg = 1 tab(s), Oral, TID, PRN PRN pain, # 20 tab(s), 0 Refill(s), Pharmacy: [facility]  XR Knee Left 1 or 2 Views, Routine, 11/15/18 12:03:00 CST, Fall, None, Ambulatory, Rad Type, Left knee pain, Not Scheduled, 11/15/18 12:03:00 CST  ?   Problem List/Past Medical History  Ongoing  Diabetes  Foot pain  GERD (gastroesophageal reflux disease)  Hyperlipidemia  Hypothyroidism(  Confirmed  )  Knee pain  Needs flu shot  Tobacco user  Vitamin D deficiency  Historical  No qualifying data  Procedure/Surgical History  Neck (2013)  Cataracts (2011)  Colonoscopy (12/13/2010)  Hysterectomy (2000)  Deviated septum (1992)   Medications  amitriptyline 50 mg oral tablet, 50 mg= 1 tab(s), Oral, Once a day (at bedtime), 6 refills  aspirin 81 mg oral tablet  Azithromycin 5 Day Dose Pack 250 mg oral tablet, 1 packet(s), Oral, Daily,? ?Still taking, not as prescribed  biest 70/30 1.25  Cheratussin AC 10 mg-100 mg/5 mL oral syrup, 10 mL, Oral, q4hr, PRN  CIPROFLOXACN TAB 500MG, 500 mg= 1 tab(s), Oral, BID,? ?Not Taking, Completed Rx  fenofibrate 200 mg oral capsule, 200 mg= 1 cap(s), Oral, Daily, 5 refills  FREESTYLE LITE TEST STRIPS, See Instructions, 11 refills  hydrochlorothiazide-lisinopril 12.5 mg-10 mg oral tablet, 1 tab(s), Oral, Daily, 3 refills  levothyroxine 88 mcg (0.088 mg) oral tablet, See Instructions, 3 refills  Lexapro 20 mg oral tablet, See Instructions, 5 refills  metformin 500 mg oral tablet, 500 mg= 1 tab(s), Oral, Daily, 3 refills  NexIUM 40 mg oral delayed release capsule, 40 mg= 1 cap(s), Oral, Daily, 3 refills  rosuvastatin 10 mg oral tablet, See Instructions, 3 refills  traMADol 50 mg oral tablet, 50 mg= 1 tab(s), Oral, TID, PRN  Allergies  Macrobid?(rash)  Social History  Alcohol  Current, Wine, Daily, 03/26/2015  Employment/School  Retired, 03/26/2015  Exercise  Exercise frequency: 1-2  times/week., 03/26/2015  Home/Environment  Lives with Spouse., 03/26/2015  Nutrition/Health  Regular, 03/26/2015  Sexual  Sexually active: Yes., 04/07/2016  Substance Abuse  Tobacco  Current every day smoker, Cigarettes, No, 11/15/2018  Family History  Pancreatic cancer.: Mother.  Primary malignant neoplasm of prostate: Father.  Tobacco: Negative: Mother.  Immunizations  Vaccine Date Status   influenza virus vaccine, inactivated 10/09/2018 Given   influenza virus vaccine, inactivated 10/12/2017 Given   influenza virus vaccine, inactivated 10/11/2016 Given   influenza virus vaccine, inactivated 10/08/2015 Given   influenza virus vaccine, inactivated 09/23/2014 Recorded   pneumococcal 23-polyvalent vaccine 10/01/2012 Recorded   Health Maintenance  Health Maintenance  ???Pending?(in the next year)  ??? ??OverDue  ??? ? ? ?Pneumococcal Vaccine due??and every?  ??? ? ? ?Bone Density Screening due??12/30/17??and every 2??year(s)  ??? ? ? ?Alcohol Misuse Screening due??04/11/18??and every 1??year(s)  ??? ??Due?  ??? ? ? ?Aspirin Therapy for CVD Prevention due??10/12/18??and every 1??year(s)  ??? ? ? ?ADL Screening due??11/15/18??and every 1??year(s)  ??? ? ? ?Cognitive Screening due??11/15/18??and every 1??year(s)  ??? ? ? ?Functional Assessment due??11/15/18??and every 1??year(s)  ??? ? ? ?Geriatric Depression Screening due??11/15/18??and every 1??year(s)  ??? ? ? ?Lung Cancer Screening due??11/15/18??and every 1??year(s)  ??? ??Due In Future?  ??? ? ? ?Diabetes Maintenance-Microalbumin not due until??04/05/19??and every 1??year(s)  ??? ? ? ?Advance Directive not due until??04/12/19??and every 1??year(s)  ??? ? ? ?Smoking Cessation not due until??04/12/19??and every 1??year(s)  ??? ? ? ?Smoking Cessation (Diabetes) not due until??08/15/19??and every 2??year(s)  ??? ? ? ?Diabetes Maintenance-Eye Exam not due until??09/18/19??and every 1??year(s)  ??? ? ? ?Diabetes Maintenance-Fasting Lipid Profile not due  until??10/04/19??and every 1??year(s)  ??? ? ? ?Hypertension Management-BMP not due until??10/04/19??and every 1??year(s)  ??? ? ? ?Diabetes Maintenance-Serum Creatinine not due until??10/04/19??and every 1??year(s)  ??? ? ? ?Diabetes Maintenance-Urine Dipstick not due until??10/04/19??and every 1??year(s)  ??? ? ? ?Diabetes Maintenance-Foot Exam not due until??10/09/19??and every 1??year(s)  ??? ? ? ?Diabetes Maintenance-HgbA1c not due until??10/09/19??and every 1??year(s)  ???Satisfied?(in the past 1 year)  ??? ??Satisfied?  ??? ? ? ?Advance Directive on??04/12/18.??Satisfied by Iris Dueñas LPN.  ??? ? ? ?Blood Pressure Screening on??11/15/18.??Satisfied by Kassandra Hartman LPN  ??? ? ? ?Body Mass Index Check on??11/15/18.??Satisfied by Kassandra Hartman LPN  ??? ? ? ?Breast Cancer Screening on??03/08/18.??Satisfied by Lizzy Campbell  ??? ? ? ?Depression Screening on??10/09/18.??Satisfied by Iris Dueñas LPN  ??? ? ? ?Diabetes Maintenance-Foot Exam on??10/09/18.??Satisfied by Mark Lawton II, MD  ??? ? ? ?Diabetes Screening on??10/04/18.??Satisfied by Zhen Montano  ??? ? ? ?Fall Risk Assessment on??11/15/18.??Satisfied by Kassandra Hartman LPN  ??? ? ? ?Hypertension Management-Blood Pressure on??11/15/18.??Satisfied by Kassandra Hartman LPN  ??? ? ? ?Influenza Vaccine on??10/09/18.??Satisfied by Iris Dueñas LPN  ??? ? ? ?Lipid Screening on??10/04/18.??Satisfied by Zhen Montano  ??? ? ? ?Obesity Screening on??11/15/18.??Satisfied by Kassandra Hartman LPN  ??? ? ? ?Smoking Cessation on??04/12/18.??Satisfied by Iris Dueñas LPN  ?  ?  Diagnostic Results  Left knee x-ray, degenerative changes, no acute fracture dislocation

## 2022-05-02 NOTE — HISTORICAL [facility]
This is a historical note converted from [place]. Formatting and pictures may have been removed.  Please reference [place] for original formatting and attached multimedia. Chief Complaint  New pt her for sameer foot pain. Referred by Dr Lawton. Also, lt knee is swelling and 1st and 2nd digit of lt hand is still numb after having CTS in 2015 by Dr Moise Shaffer  History of Present Illness  72-year-old female presents today for?evaluation of bilateral foot pain left worse than right. ?Patient had a long-standing history of foot pain. ?She has had some pain in the past is tried custom orthotics although these do not fit anymore.? Notes mainly pain to the?medial aspect of the right ankle as well as some?pain the lateral aspect she stands.? She takes occasional anti-inflammatory. ?Does not feel the pain is very limiting.? Denies any radiations. ?Does feel that her flatfoot deformity continues to worsen.  Review of Systems  Denies fevers, chills, chest pain, shortness of breath. Review of systems otherwise negative except as in HPI.  Physical Exam  Vitals & Measurements  BP:?136/80? HT:?165.10?cm? HT:?165.10?cm? WT:?54.431?kg? WT:?54.431?kg? BMI:?19.97?  General: No acute distress, alert and oriented, healthy appearing?  HEENT: Head is atraumatic, mucous membranes are moist  Neck: Supples, no JVD  Cardiovascular: Palpable dorsalis pedis and posterior tibial pulses, regular rate and rhythm to those pulses  Lungs: Breathing non-labored  Skin: no rashes appreciated  Neurologic: Can flex and extend knees, ankles, and toes. Sensation is grossly intact;  ?  Foot exam:  Significant bony deformity to her great toe. ?No hypermobility?of her first TMT joint. ?Significant pes planus with heel in valgus.? Does not have any hammertoes?or bunionette.? Hindfoot supple. ?Large osteophyte?over her midfoot.? Positive synovitis?in the subfibular region. ?Mild tenderness palpation this area.? No significant tenderness palpation of the medial  ankle. ?Full dorsiflexion plantarflexion of the ankle.? Positive Silverskiold with?dorsiflexion to?5??with knee extended. ?Increased to 15 with knee flexed.? Brisk capillary refill distally. ?Sensation intact light touch distally.? Positive single leg stance?with single leg heel raise. ?This does reconstitute her?arch?in her heel goes into varus.  Right foot  With similar bunion deformity although not quite as significant.? Right heel in neutral with supple hindfoot.? No significant midfoot OA. ?No pain with hindfoot or midfoot?range of motion.? Positive Silverskiold although?can dorsiflex to 10??increase to 15 with knee flexed.? 5 out of 5 strength eversion inversion?plantar flexion dorsiflexion.  ?  Assessment/Plan  1.?Posterior tibial tendon dysfunction, left  Patient was in early posterior tibial tendon dysfunction as well as bunion deformity. ?Discussed that from a reconstructive standpoint, she would likely need ?bunionectomy?with metatarsal osteotomy as well as?flatfoot reconstruction.? The current time she like to try some conservative measures.? She hardly has custom orthotics made years ago and is going to try these although given her significant forefoot deformity?feel these will likely?not fit her very well.? She will try some over-the-counter orthotics as well with good medial arch support as well as wide toe box shoes.? We will also give her prescription for some anti-inflammatory see this improve her symptoms.? If the majority of her symptoms are likely to subfibular impingement.  Ordered:  famotidine-ibuprofen, 1 tab(s), Oral, TID, do not crush or chew, # 21 tab(s), 0 Refill(s)  Office/Outpatient Visit Level 3 New [id number] PC, Posterior tibial tendon dysfunction, left  Posterior tibial tendon dysfunction, right, [facility], 08/15/17 11:59:00 CDT  ?  2.?Posterior tibial tendon dysfunction, right  Try anti-inflammatories as well on the right side. ?She is currently a symptomatically the  right side.  Ordered:  famotidine-ibuprofen, 1 tab(s), Oral, TID, do not crush or chew, # 21 tab(s), 0 Refill(s)  Office/Outpatient Visit Level 3 New [id number] PC, Posterior tibial tendon dysfunction, left  Posterior tibial tendon dysfunction, right, [facility], 08/15/17 11:59:00 CDT  ?  Left ankle pain  ?  Left foot pain  ?  Right ankle pain  ?  Right foot pain  ?  Orders:  Clinic Follow-up PRN, 08/15/17 11:59:00 CDT, Future Order, [facility]   Problem List/Past Medical History  Diabetes  Foot pain  GERD (gastroesophageal reflux disease)  Hyperlipidemia  Hypothyroidism(  Confirmed  )  Knee pain  Tobacco user  Vitamin D deficiency  Historical  Needs flu shot  Procedure/Surgical History  Neck (2013), Cataracts (2011), Hysterectomy (2000), Deviated septum. (1992).  Medications  amitriptyline 25 mg oral tablet, 25 mg, 1 tab(s), Oral, Once a day (at bedtime), 2 refills  aspirin 81 mg oral tablet  biest 70/30 1.25  Crestor 10 mg oral tablet, 10 mg, 1 tab(s), Oral, Once a day (at bedtime), 3 refills  Duexis 800 mg-26.6 mg oral tablet, 1 tab(s), Oral, TID  fenofibrate 200 mg oral capsule, 200 mg, 1 cap(s), Oral, Daily, 5 refills,? ?Not taking  FREESTYLE TEST STRIPS, See Instructions, 11 refills  hydrochlorothiazide-lisinopril 12.5 mg-10 mg oral tablet, 1 tab(s), Oral, Daily, 5 refills  levothyroxine 88 mcg (0.088 mg) oral tablet, 88 mcg, 1 tab(s), Oral, Daily, 3 refills  Lexapro 20 mg oral tablet, See Instructions, 5 refills  metformin 500 mg oral tablet, See Instructions, 11 refills  NexIUM 40 mg oral delayed release capsule, 40 mg, 1 cap(s), Oral, Daily, 5 refills  Allergies  No Known Allergies  Social History  Alcohol  Current, Wine, Daily  Employment/School  Retired  Exercise  Exercise frequency: 1-2 times/week.  Home/Environment  Lives with Spouse.  Nutrition/Health  Regular  Sexual  Sexually active: Yes.  Substance Abuse  Tobacco  Current every day smoker, Cigarettes  Family History  Pancreatic  cancer.: Mother.  Primary malignant neoplasm of prostate: Father.  Tobacco: Negative: Mother.  Diagnostic Results  AP lateral bilateral feet and ankles taken today and reviewed. ?Patient?has significant midfoot arthritis in the left worse than right.? No significant subtalar arthritis. ?No significant ankle arthritis.? Patient?again noted to have?large bunion deformity?to left greater than right foot.

## 2022-05-02 NOTE — HISTORICAL [facility]
This is a historical note converted from [place]. Formatting and pictures may have been removed.  Please reference [place] for original formatting and attached multimedia. Chief Complaint  LEFT KNEE PAIN. ?LAST SEEN ON 9/19/19. ?SHE FELL IN THE BATHROOM ON SUNDAY MORNING ON TO HER LEFT KNEE. ?SHE HAS BEEN ICING AND HEAT WITH TYLENOL.  History of Present Illness  75-year-old female presents here for follow-up of?bilateral knee pain. ?Patient has severe?and significant worsening of her left knee recently.? She has pain anteriorly?overlying her patellofemoral joint. ?Did have a fall?that has caused her worsening left knee pain.? She has had injections in the past with minimal relief of her symptoms.  Review of Systems  Denies fevers, chills, chest pain, shortness of breath. Comprehensive review of systems performed and otherwise negative except as noted in HPI.  Physical Exam  Vitals & Measurements  T:?97.4? ?F (Oral)? BP:?145/66?  HT:?154.00?cm? WT:?60.000?kg? BMI:?25.3?  General: No acute distress, alert and oriented, healthy appearing?  HEENT: Head is atraumatic, mucous membranes are moist?  Cardiovascular: Brisk capillary refill  Lungs: Breathing non-labored?  Skin: no rashes appreciated?  Neurologic: Sensation is grossly intact distally  ?  Left knee:  Brisk cap refill distally. ?Sensation intact distally. ?Patient lacks 5 degrees of terminal extension. ?Can flex 105 degrees.? Crepitus throughout range of motion patellofemoral joint as well as medial joint line.  Assessment/Plan  1.?Primary osteoarthritis of left knee?M17.12  ?X-rays show further worsening of her end-stage arthritis to her left knee. ?We discussed all treatment options today. ?At the current time,?she like to hold off on surgical intervention. ?She is considering surgery?at the beginning of next year. ?Injections have failed to improve her symptoms in the past.? We will plan to see her back in 6 weeks?to determine further treatment options.? We did  discuss arthroplasty?today.? Patient will contact us if she wishes to proceed?sooner.  Ordered:  Clinic Follow up, *Est. 12/29/20 3:00:00 CST, Order for future visit, Primary osteoarthritis of left knee, [facility]  Office/Outpatient Visit Level 3 Established [id number] PC, Primary osteoarthritis of left knee, [facility], 11/17/20 12:06:00 CST  ?  Orders:  XR Knee Left 4 or More Views, Routine, 11/17/20 11:45:00 CST, None, Stretcher, Patient Has IV?, Rad Type, Left knee pain, Not Scheduled, 11/17/20 11:45:00 CST  Referrals  Clinic Follow up, *Est. 12/29/20 3:00:00 CST, Order for future visit, Primary osteoarthritis of left knee, [facility]   Problem List/Past Medical History  Ongoing  Cancer of central portion of left breast  Diabetes  Foot pain  GERD (gastroesophageal reflux disease)  Hyperlipidemia  Hypothyroidism(  Confirmed  )  Insomnia  Knee pain  Needs flu shot  Tobacco user  Vitamin D deficiency  Well adult exam  Historical  Breast cancer  Diabetes mellitus type 2  High cholesterol  Hypertension  Thyroid  Procedure/Surgical History  Biopsy or excision of lymph node(s); open, deep axillary node(s) (07/08/2019)  Biopsy Sentinal Node (Left) (07/08/2019)  Excision of Left Axillary Lymphatic, Open Approach, Diagnostic (07/08/2019)  Mastectomy Simple (Left) (07/08/2019)  Mastectomy, simple, complete (07/08/2019)  Resection of Left Breast, Open Approach (07/08/2019)  Biopsy, breast, with placement of breast localization device(s) (eg, clip, metallic pellet), when performed, and imaging of the biopsy specimen, when performed, percutaneous; first lesion, including ultrasound guidance (05/22/2019)  Excision of Left Breast, Percutaneous Approach, Diagnostic (05/22/2019)  Neck (2013)  Cataracts (2011)  Colonoscopy (12/13/2010)  Hysterectomy (2000)  Deviated septum (1992)   Medications  amitriptyline 25 mg oral tablet, See Instructions  amitriptyline 25 mg oral tablet, See Instructions  amLODIPine 10 mg  oral tablet, 10 mg= 1 tab(s), Oral, Daily, 3 refills  aspirin 81 mg oral tablet, 81 mg= 1 tab(s), Oral, Daily, 11 refills  fenofibrate 200 mg oral capsule, 200 mg= 1 cap(s), Oral, Daily, 5 refills,? ?Not taking  Fish Oil oral capsule, 1 cap(s), Oral, Daily  hydrochlorothiazide 12.5 mg oral tablet, 12.5 mg= 1 tab(s), Oral, Daily, 5 refills,? ?Not taking: Last Dose Date/Time Unknown  hydrochlorothiazide-losartan 12.5 mg-100 mg oral tablet, 1 tab(s), Oral, Daily  letrozole 2.5 mg oral tablet, See Instructions  levothyroxine 88 mcg (0.088 mg) oral tablet, See Instructions  Lexapro 20 mg oral tablet, 20 mg= 1 tab(s), Oral, Daily, 3 refills  losartan 100 mg oral tablet, 100 mg= 1 tab(s), Oral, Daily, 5 refills,? ?Not taking  meloxicam 7.5 mg oral tablet, 7.5 mg= 1 tab(s), Oral, Daily, 3 refills  metformin 500 mg oral tablet, See Instructions  methylPREDNISolone 4 mg oral tab,? ?Not taking  multivitamin with minerals (Adult Tab), 1 tab(s), Oral, Daily  NexIUM 40 mg oral delayed release capsule, 40 mg= 1 cap(s), Oral, Daily, 3 refills  rosuvastatin 10 mg oral tablet, See Instructions, 3 refills  TheraCran One oral capsule, 1 tab(s), Oral, Daily  tiZANidine 4 mg oral tablet, 4 mg= 1 tab(s), Oral, At Bedtime, PRN, 1 refills  Vitamin D, 1 tab(s), Oral, Daily  vitamin E 400 intl units oral capsule, 400 IntUnit= 1 cap(s), Oral, Daily  Allergies  ACE inhibitors?(Angioedema)  Macrobid?(rash)  Social History  Abuse/Neglect  No, No, Yes, 11/17/2020  Alcohol  Current, Daily, 09/10/2019  Employment/School  Retired, 03/26/2015  Exercise  Exercise frequency: 1-2 times/week., 03/26/2015  Home/Environment  Lives with Spouse., 03/26/2015  Nutrition/Health  Regular, 03/26/2015  Sexual  Sexually active: Yes., 04/07/2016  Substance Use  Never, 09/10/2019  Tobacco  5-9 cigarettes (between 1/4 to 1/2 pack)/day in last 30 days, No, 11/17/2020  Family History  Pancreatic cancer.: Mother.  Primary malignant neoplasm of breast: Sister.  Primary  malignant neoplasm of prostate: Father.  Tobacco: Negative: Mother.  Immunizations  Vaccine Date Status   influenza virus vaccine, inactivated 10/10/2019 Given   influenza virus vaccine, inactivated 10/09/2018 Given   influenza virus vaccine, inactivated 10/12/2017 Given   influenza virus vaccine, inactivated 10/11/2016 Given   influenza virus vaccine, inactivated 10/08/2015 Given   influenza virus vaccine, inactivated 09/23/2014 Recorded   pneumococcal 23-polyvalent vaccine 10/01/2012 Recorded   Health Maintenance  Health Maintenance  ???Pending?(in the next year)  ??? ??OverDue  ??? ? ? ?Advance Directive due??01/02/20??and every 1??year(s)  ??? ? ? ?Cognitive Screening due??01/02/20??and every 1??year(s)  ??? ??Due?  ??? ? ? ?Influenza Vaccine due??10/01/20??and every 1??day(s)  ??? ? ? ?ADL Screening due??11/17/20??and every 1??year(s)  ??? ? ? ?Tetanus Vaccine due??11/17/20??and every 10??year(s)  ??? ? ? ?Zoster Vaccine due??11/17/20??Unknown Frequency  ??? ??Due In Future?  ??? ? ? ?Obesity Screening not due until??01/01/21??and every 1??year(s)  ??? ? ? ?Smoking Cessation not due until??01/01/21??and every 1??year(s)  ??? ? ? ?Fall Risk Assessment not due until??01/02/21??and every 1??year(s)  ??? ? ? ?Functional Assessment not due until??01/02/21??and every 1??year(s)  ??? ? ? ?Diabetes Maintenance-HgbA1c not due until??06/25/21??and every 1??year(s)  ??? ? ? ?Hypertension Management-BMP not due until??06/25/21??and every 1??year(s)  ??? ? ? ?Diabetes Maintenance-Fasting Lipid Profile not due until??06/25/21??and every 1??year(s)  ??? ? ? ?Diabetes Maintenance-Serum Creatinine not due until??06/25/21??and every 1??year(s)  ??? ? ? ?Depression Screening not due until??07/02/21??and every 1??year(s)  ??? ? ? ?Diabetes Maintenance-Foot Exam not due until??07/02/21??and every 1??year(s)  ??? ? ? ?Aspirin Therapy for CVD Prevention not due until??07/02/21??and every 1??year(s)  ??? ? ? ?Medicare Annual Wellness  Exam not due until??07/02/21??and every 1??year(s)  ??? ? ? ?Bone Density Screening not due until??09/26/21??and every 2??year(s)  ???Satisfied?(in the past 1 year)  ??? ??Satisfied?  ??? ? ? ?Aspirin Therapy for CVD Prevention on??07/02/20.??Satisfied by Mark Lawton II, MD  ??? ? ? ?Blood Pressure Screening on??11/17/20.??Satisfied by Christina Swan  ??? ? ? ?Body Mass Index Check on??11/17/20.??Satisfied by Christina Swan  ??? ? ? ?Depression Screening on??07/02/20.??Satisfied by Iris Dueñas LPN.  ??? ? ? ?Diabetes Maintenance-Eye Exam on??10/29/20.??Satisfied by Iris Dueñas LPN  ??? ? ? ?Diabetes Maintenance-Fasting Lipid Profile on??06/25/20.??Satisfied by Aliya Rodriguez MT  ??? ? ? ?Diabetes Maintenance-Foot Exam on??07/02/20.??Satisfied by Mark Lawton II, MD  ??? ? ? ?Diabetes Maintenance-HgbA1c on??06/25/20.??Satisfied by Aliya Rodriguez MT  ??? ? ? ?Diabetes Maintenance-Serum Creatinine on??06/25/20.??Satisfied by Aliya Rodriguez MT  ??? ? ? ?Diabetes Screening on??06/25/20.??Satisfied by Aliya Rodriguez MT  ??? ? ? ?Fall Risk Assessment on??11/17/20.??Satisfied by Christina Swan  ??? ? ? ?Functional Assessment on??07/02/20.??Satisfied by Iris Dueñas LPN  ??? ? ? ?Hypertension Management-BMP on??06/25/20.??Satisfied by Aliya Rodriguez MT  ??? ? ? ?Hypertension Management-Blood Pressure on??11/17/20.??Satisfied by Christina Swan  ??? ? ? ?Lipid Screening on??06/25/20.??Satisfied by Aliya Rodriguez MT  ??? ? ? ?Medicare Annual Wellness Exam on??07/02/20.??Satisfied by Mark Lawton II, MD  ??? ? ? ?Obesity Screening on??11/17/20.??Satisfied by Christina Swan  ??? ? ? ?Smoking Cessation on??11/17/20.??Satisfied by Christina Swan  ?  Diagnostic Results  AP lateral left knee reviewed. ?Patient with end-stage arthritis with loss of joint space and bone-on-bone articulation.

## 2022-05-20 DIAGNOSIS — C50.112 MALIGNANT NEOPLASM OF CENTRAL PORTION OF LEFT FEMALE BREAST, UNSPECIFIED ESTROGEN RECEPTOR STATUS: Primary | ICD-10-CM

## 2022-05-24 PROBLEM — E55.9 VITAMIN D DEFICIENCY: Status: ACTIVE | Noted: 2022-05-24

## 2022-05-24 PROBLEM — E03.9 HYPOTHYROIDISM: Status: ACTIVE | Noted: 2022-05-24

## 2022-05-24 PROBLEM — L80 VITILIGO: Status: ACTIVE | Noted: 2022-05-24

## 2022-05-24 PROBLEM — G47.00 INSOMNIA: Status: ACTIVE | Noted: 2022-05-24

## 2022-05-24 PROBLEM — K21.9 GASTROESOPHAGEAL REFLUX DISEASE: Status: ACTIVE | Noted: 2022-05-24

## 2022-05-24 PROBLEM — Z72.0 TOBACCO USER: Status: ACTIVE | Noted: 2022-05-24

## 2022-05-24 PROBLEM — I70.209 ATHEROSCLEROSIS OF ARTERIES OF EXTREMITIES: Status: ACTIVE | Noted: 2020-07-22

## 2022-05-24 PROBLEM — E78.5 HYPERLIPIDEMIA: Status: ACTIVE | Noted: 2022-05-24

## 2022-05-24 PROBLEM — E11.9 DIABETES MELLITUS: Status: ACTIVE | Noted: 2022-05-24

## 2022-05-24 PROBLEM — I73.9 PERIPHERAL VASCULAR DISEASE: Status: ACTIVE | Noted: 2022-05-24

## 2022-05-24 PROBLEM — I73.9 INTERMITTENT CLAUDICATION: Status: ACTIVE | Noted: 2020-07-22

## 2022-05-24 PROBLEM — I10 ESSENTIAL HYPERTENSION: Status: ACTIVE | Noted: 2020-07-22

## 2022-05-24 PROBLEM — C50.919 PRIMARY MALIGNANT NEOPLASM OF BREAST: Status: ACTIVE | Noted: 2022-05-24

## 2022-05-24 NOTE — PROGRESS NOTES
Subjective:       Patient ID: Marlin Herrera is a 76 y.o. female.    Surgeon: Dr. Emelina Leonard    Left Breast Cancer Stage IA ([id number]) Diagnosed 5/22/19  Biopsy/pathology: Left breast 6:00 periareolar lesion biopsy done 5/22/19--invasive ductal carcinoma Grade 2, DCIS Grade 2, proliferative fibrocystic changes with multifocal atypical lobular hyperplasia, ER 99.58%, GA 47.36%, Her2 Equivocal by IHC 2+, negative by FISH.  Surgery/pathology: Left breast mastectomy done 7/8/19--infiltrating ductal carcinoma, NOS type, Grade 2, unifocal measures 1cm in greatest dimension, associated foci of DCIS grade 2, 8mm in greatest extent, margins free, no vascular invasion, 5 sentinel and 1 axillary lymph node all negative (T1bN0).  Imaging:  Screening MMG 4/17/19--developing focal asymmetry with calcifications in left breast at 6:00, needs additional imaging BIRADS 0.  Left breast Reji diagnostic MMG/US done 5/14/19--Left breast 6:00 periareolar position mass measures 0.9X1.6X0.6cm with associated calcifications, suspicious BIRADS 4.  CT chest LD screening done 3/25/2021--Stable tiny left lower lobe nodule, few punctate nodules not clearly seen on prior, for example 3 mm right upper lobe nodule, right upper lobe 2 mm nodule anteriorly, benign, recommend repeat in 12 months.    DEXA:  9/26/19--AP Spine T= -0.2, Femur neck left 0.1, right 0.2, Femur total left 0.6, right 0.0 Normal bone density.  9/30/21--AP Spine T= -0.4, Femur neck left 0.1, right 0.4, Femur total left 0.2, right -0.3, normal bone density, mixed findings.    Treatment plan: Femara X 5-10 years started 8/15/19.         Chief Complaint: Other Misc (Pt want to know if she should continue Letrozole. If so, she needs a new script.)    HPI   Patient presents for follow-up of breast cancer. She continues on the Femara without any problems. No new complaints reported. She is still smoking but she has cut back. She does have claudication and is being followed by  vascular surgery. Also mentions having chronic neck pain/spasms and will be seeing a physician soon about options. No other problems reported.     Past Medical History:   Diagnosis Date    Breast cancer     DM (diabetes mellitus)     GERD (gastroesophageal reflux disease)     Hypothyroidism, unspecified     Mixed hyperlipidemia     PVD (peripheral vascular disease)       Review of patient's allergies indicates:   Allergen Reactions    Ace inhibitors Swelling    Metformin Diarrhea    Nitrofurantoin monohyd/m-cryst Rash        Current Outpatient Medications:     amitriptyline (ELAVIL) 25 MG tablet, amitriptyline 25 mg tablet, Disp: , Rfl:     amLODIPine (NORVASC) 5 MG tablet, Take 5 mg by mouth 2 (two) times daily., Disp: , Rfl:     EScitalopram oxalate (LEXAPRO) 20 MG tablet, escitalopram 20 mg tablet, Disp: , Rfl:     esomeprazole (NEXIUM) 40 MG capsule, Nexium 40 mg capsule,delayed release  Take 1 capsule every day by oral route., Disp: , Rfl:     letrozole (FEMARA) 2.5 mg Tab, letrozole 2.5 mg tablet, Disp: , Rfl:     levothyroxine (SYNTHROID) 88 MCG tablet, levothyroxine 88 mcg tablet, Disp: , Rfl:     losartan-hydrochlorothiazide 100-12.5 mg (HYZAAR) 100-12.5 mg Tab, losartan 100 mg-hydrochlorothiazide 12.5 mg tablet  Take 1 tablet every day by oral route for 90 days., Disp: , Rfl:     meloxicam (MOBIC) 7.5 MG tablet, Take 7.5 mg by mouth once daily., Disp: , Rfl:     metoprolol succinate (TOPROL-XL) 100 MG 24 hr tablet, Take 100 mg by mouth once daily., Disp: , Rfl:     rosuvastatin (CRESTOR) 10 MG tablet, TAKE 1 TABLET BY MOUTH EVERY DAY IN EVENING, Disp: 90 tablet, Rfl: 4    tiZANidine (ZANAFLEX) 4 MG tablet, tizanidine 4 mg tablet, Disp: , Rfl:     aspirin (ECOTRIN) 81 MG EC tablet, aspirin 81 mg tablet,delayed release  Take 1 tablet every day by oral route., Disp: , Rfl:     calcium carbonate (OS-YRN) 600 mg calcium (1,500 mg) Tab, Calcium 600 mg calcium (1,500 mg) tablet  Take 1  tablet every day by oral route., Disp: , Rfl:     losartan (COZAAR) 100 MG tablet, losartan 100 mg tablet  Take 1 tablet every day by oral route for 90 days., Disp: , Rfl:   Review of Systems   Constitutional: Positive for fatigue. Negative for activity change, fever and unexpected weight change.   Eyes: Negative for visual disturbance.   Respiratory: Negative for cough and shortness of breath.    Cardiovascular: Negative for chest pain.        PVD with claudication pain   Gastrointestinal: Negative for abdominal pain, blood in stool, constipation, diarrhea, nausea and vomiting.   Genitourinary: Negative for difficulty urinating.   Musculoskeletal: Positive for neck pain and neck stiffness. Negative for back pain.   Integumentary:  Negative for rash.   Neurological: Negative for dizziness, weakness and headaches.   Psychiatric/Behavioral: Negative for behavioral problems and suicidal ideas.            Vitals:    05/31/22 1331   BP: (!) 168/72   Pulse: 65   Resp: 14   Temp: 98.2 °F (36.8 °C)      Physical Exam  Vitals reviewed.   Constitutional:       Appearance: Normal appearance. She is normal weight.   HENT:      Head: Normocephalic.   Eyes:      General: Lids are normal. Vision grossly intact.      Extraocular Movements: Extraocular movements intact.      Conjunctiva/sclera: Conjunctivae normal.   Neck:      Comments: Neck pain and limited ROM  Cardiovascular:      Rate and Rhythm: Normal rate and regular rhythm.      Pulses: Normal pulses.      Heart sounds: Normal heart sounds, S1 normal and S2 normal.   Pulmonary:      Effort: Pulmonary effort is normal.      Breath sounds: Normal breath sounds.   Chest:   Breasts:      Left: Absent.        Comments: Left chest wall s/p mastectomy. Right breast normal. No palpable axillary adenopathy bilaterally  Abdominal:      General: Abdomen is flat. Bowel sounds are normal.      Palpations: Abdomen is soft.   Musculoskeletal:      Cervical back: Rigidity present. Pain  with movement present.      Thoracic back: Normal.      Lumbar back: Normal.   Feet:      Right foot:      Skin integrity: Skin integrity normal.   Lymphadenopathy:      Comments: No palpable adenopathy   Skin:     General: Skin is warm.      Capillary Refill: Capillary refill takes less than 2 seconds.   Neurological:      Mental Status: She is alert.   Psychiatric:         Attention and Perception: Attention normal.         Mood and Affect: Mood normal.         Speech: Speech normal.         Behavior: Behavior normal. Behavior is cooperative.         Cognition and Memory: Cognition normal.         Judgment: Judgment normal.       ECOG SCORE    1 - Restricted in strenuous activity-ambulatory and able to carry out work of a light nature       Lab Visit on 05/26/2022   Component Date Value    Sodium Level 05/26/2022 141     Potassium Level 05/26/2022 4.6     Chloride 05/26/2022 102     Carbon Dioxide 05/26/2022 29     Glucose Level 05/26/2022 97     Blood Urea Nitrogen 05/26/2022 23.0 (A)    Creatinine 05/26/2022 1.03 (A)    Calcium Level Total 05/26/2022 9.7     Protein Total 05/26/2022 6.9     Albumin Level 05/26/2022 4.1     Globulin 05/26/2022 2.8     Albumin/Globulin Ratio 05/26/2022 1.5     Bilirubin Total 05/26/2022 0.6     Alkaline Phosphatase 05/26/2022 69     Alanine Aminotransferase 05/26/2022 15     Aspartate Aminotransfera* 05/26/2022 23     Estimated GFR-Non Cara* 05/26/2022 55     WBC 05/26/2022 7.9     RBC 05/26/2022 4.05 (A)    Hgb 05/26/2022 12.9     Hct 05/26/2022 40.3     MCV 05/26/2022 99.5 (A)    MCH 05/26/2022 31.9 (A)    MCHC 05/26/2022 32.0 (A)    RDW 05/26/2022 12.2     Platelet 05/26/2022 144     MPV 05/26/2022 9.2 (A)    Neut % 05/26/2022 62.4     Lymph % 05/26/2022 24.7     Mono % 05/26/2022 10.9     Eos % 05/26/2022 1.6     Basophil % 05/26/2022 0.3     Lymph # 05/26/2022 1.95     Neut # 05/26/2022 4.9     Mono # 05/26/2022 0.86     Eos # 05/26/2022  0.13     Baso # 05/26/2022 0.02     IG# 05/26/2022 0.01     IG% 05/26/2022 0.1           Assessment:       Problem List Items Addressed This Visit        Oncology    Primary malignant neoplasm of breast - Primary             Plan:       Patient with stage IA left breast cancer s/p mastectomy done 7/8/19. Tumor measured 1cm, Grade 2, ER+ and IN+ and Her2 negative, node negative.  Per NCCN guidelines, can consider Oncotype DX testing.  However, due to patient's age and small size of tumor, chemotherapy felt to be of limited benefit.  I recommended treatment with AI X 5-10 years.    Currently patient is doing well without any signs or symptoms to suggest disease recurrence.  Patient started Femara on 8/15/19 and is tolerating well.   Labs done recently show mild renal insufficiency. She is on a combination BP/diuretic. Encouraged to drink more water.   Continue Femara.  Continue Calcium and vitamin D.  DEXA from 9/26/19 normal. Repeat from 9/2021 with continued normal bone density, mixed response.  Right Reji diagnostic MMG scheduled for next month.   LDCT scan of chest for lung cancer surveillance done on 4/7/22 was Category 1, benign. Plan to repeat in 1 year.   RTC 6 months for follow-up with labs.  Smoking cessation encouraged.    All questions answered at this time.      ALBERTO Bobby

## 2022-05-26 ENCOUNTER — LAB VISIT (OUTPATIENT)
Dept: LAB | Facility: HOSPITAL | Age: 77
End: 2022-05-26
Payer: MEDICARE

## 2022-05-26 DIAGNOSIS — C50.112 MALIGNANT NEOPLASM OF CENTRAL PORTION OF LEFT FEMALE BREAST, UNSPECIFIED ESTROGEN RECEPTOR STATUS: ICD-10-CM

## 2022-05-26 LAB
ALBUMIN SERPL-MCNC: 4.1 GM/DL (ref 3.4–4.8)
ALBUMIN/GLOB SERPL: 1.5 RATIO (ref 1.1–2)
ALP SERPL-CCNC: 69 UNIT/L (ref 40–150)
ALT SERPL-CCNC: 15 UNIT/L (ref 0–55)
AST SERPL-CCNC: 23 UNIT/L (ref 5–34)
BASOPHILS # BLD AUTO: 0.02 X10(3)/MCL (ref 0–0.2)
BASOPHILS NFR BLD AUTO: 0.3 %
BILIRUBIN DIRECT+TOT PNL SERPL-MCNC: 0.6 MG/DL
BUN SERPL-MCNC: 23 MG/DL (ref 9.8–20.1)
CALCIUM SERPL-MCNC: 9.7 MG/DL (ref 8.4–10.2)
CHLORIDE SERPL-SCNC: 102 MMOL/L (ref 98–107)
CO2 SERPL-SCNC: 29 MMOL/L (ref 23–31)
CREAT SERPL-MCNC: 1.03 MG/DL (ref 0.55–1.02)
EOSINOPHIL # BLD AUTO: 0.13 X10(3)/MCL (ref 0–0.9)
EOSINOPHIL NFR BLD AUTO: 1.6 %
ERYTHROCYTE [DISTWIDTH] IN BLOOD BY AUTOMATED COUNT: 12.2 % (ref 11.5–17)
GLOBULIN SER-MCNC: 2.8 GM/DL (ref 2.4–3.5)
GLUCOSE SERPL-MCNC: 97 MG/DL (ref 82–115)
HCT VFR BLD AUTO: 40.3 % (ref 37–47)
HGB BLD-MCNC: 12.9 GM/DL (ref 12–16)
IMM GRANULOCYTES # BLD AUTO: 0.01 X10(3)/MCL (ref 0–0.02)
IMM GRANULOCYTES NFR BLD AUTO: 0.1 % (ref 0–0.43)
LYMPHOCYTES # BLD AUTO: 1.95 X10(3)/MCL (ref 0.6–4.6)
LYMPHOCYTES NFR BLD AUTO: 24.7 %
MCH RBC QN AUTO: 31.9 PG (ref 27–31)
MCHC RBC AUTO-ENTMCNC: 32 MG/DL (ref 33–36)
MCV RBC AUTO: 99.5 FL (ref 80–94)
MONOCYTES # BLD AUTO: 0.86 X10(3)/MCL (ref 0.1–1.3)
MONOCYTES NFR BLD AUTO: 10.9 %
NEUTROPHILS # BLD AUTO: 4.9 X10(3)/MCL (ref 2.1–9.2)
NEUTROPHILS NFR BLD AUTO: 62.4 %
PLATELET # BLD AUTO: 144 X10(3)/MCL (ref 130–400)
PMV BLD AUTO: 9.2 FL (ref 9.4–12.4)
POTASSIUM SERPL-SCNC: 4.6 MMOL/L (ref 3.5–5.1)
PROT SERPL-MCNC: 6.9 GM/DL (ref 5.8–7.6)
RBC # BLD AUTO: 4.05 X10(6)/MCL (ref 4.2–5.4)
SODIUM SERPL-SCNC: 141 MMOL/L (ref 136–145)
WBC # SPEC AUTO: 7.9 X10(3)/MCL (ref 4.5–11.5)

## 2022-05-26 PROCEDURE — 36415 COLL VENOUS BLD VENIPUNCTURE: CPT

## 2022-05-26 PROCEDURE — 85025 COMPLETE CBC W/AUTO DIFF WBC: CPT

## 2022-05-26 PROCEDURE — 80053 COMPREHEN METABOLIC PANEL: CPT

## 2022-05-31 ENCOUNTER — OFFICE VISIT (OUTPATIENT)
Dept: HEMATOLOGY/ONCOLOGY | Facility: CLINIC | Age: 77
End: 2022-05-31
Payer: MEDICARE

## 2022-05-31 VITALS
DIASTOLIC BLOOD PRESSURE: 72 MMHG | BODY MASS INDEX: 25.1 KG/M2 | WEIGHT: 132.94 LBS | SYSTOLIC BLOOD PRESSURE: 168 MMHG | TEMPERATURE: 98 F | HEIGHT: 61 IN | OXYGEN SATURATION: 98 % | HEART RATE: 65 BPM | RESPIRATION RATE: 14 BRPM

## 2022-05-31 DIAGNOSIS — C50.912 PRIMARY MALIGNANT NEOPLASM OF LEFT BREAST: Primary | ICD-10-CM

## 2022-05-31 PROCEDURE — 99213 PR OFFICE/OUTPT VISIT, EST, LEVL III, 20-29 MIN: ICD-10-PCS | Mod: S$PBB,,, | Performed by: NURSE PRACTITIONER

## 2022-05-31 PROCEDURE — 99214 OFFICE O/P EST MOD 30 MIN: CPT | Mod: PBBFAC | Performed by: NURSE PRACTITIONER

## 2022-05-31 PROCEDURE — 99213 OFFICE O/P EST LOW 20 MIN: CPT | Mod: S$PBB,,, | Performed by: NURSE PRACTITIONER

## 2022-05-31 PROCEDURE — 99999 PR PBB SHADOW E&M-EST. PATIENT-LVL IV: CPT | Mod: PBBFAC,,, | Performed by: NURSE PRACTITIONER

## 2022-05-31 PROCEDURE — 99999 PR PBB SHADOW E&M-EST. PATIENT-LVL IV: ICD-10-PCS | Mod: PBBFAC,,, | Performed by: NURSE PRACTITIONER

## 2022-05-31 RX ORDER — MELOXICAM 7.5 MG/1
7.5 TABLET ORAL DAILY
COMMUNITY
Start: 2022-03-11 | End: 2023-05-24

## 2022-05-31 RX ORDER — AMITRIPTYLINE HYDROCHLORIDE 25 MG/1
TABLET, FILM COATED ORAL
COMMUNITY
Start: 2021-11-10 | End: 2022-10-05

## 2022-05-31 RX ORDER — CALCIUM CARBONATE 600 MG
TABLET ORAL
COMMUNITY

## 2022-05-31 RX ORDER — ASPIRIN 81 MG/1
TABLET ORAL
COMMUNITY

## 2022-05-31 RX ORDER — LETROZOLE 2.5 MG/1
2.5 TABLET, FILM COATED ORAL DAILY
Qty: 30 TABLET | Refills: 6 | Status: SHIPPED | OUTPATIENT
Start: 2022-05-31 | End: 2022-12-19

## 2022-05-31 RX ORDER — METOPROLOL SUCCINATE 100 MG/1
100 TABLET, EXTENDED RELEASE ORAL DAILY
COMMUNITY
Start: 2022-04-22 | End: 2023-02-02

## 2022-05-31 RX ORDER — AMLODIPINE BESYLATE 5 MG/1
5 TABLET ORAL 2 TIMES DAILY
COMMUNITY
Start: 2022-03-15 | End: 2022-12-15

## 2022-05-31 RX ORDER — ESOMEPRAZOLE MAGNESIUM 40 MG/1
CAPSULE, DELAYED RELEASE ORAL
COMMUNITY
End: 2023-01-30 | Stop reason: SDUPTHER

## 2022-05-31 RX ORDER — LETROZOLE 2.5 MG/1
TABLET, FILM COATED ORAL
COMMUNITY
Start: 2021-11-18 | End: 2022-05-31 | Stop reason: SDUPTHER

## 2022-05-31 RX ORDER — TIZANIDINE 4 MG/1
TABLET ORAL
COMMUNITY

## 2022-05-31 RX ORDER — LOSARTAN POTASSIUM AND HYDROCHLOROTHIAZIDE 12.5; 1 MG/1; MG/1
TABLET ORAL
COMMUNITY
Start: 2021-09-24 | End: 2022-09-23

## 2022-05-31 RX ORDER — LOSARTAN POTASSIUM 100 MG/1
TABLET ORAL
COMMUNITY
End: 2022-08-02

## 2022-05-31 RX ORDER — LEVOTHYROXINE SODIUM 88 UG/1
TABLET ORAL
COMMUNITY
Start: 2021-10-21 | End: 2022-08-23

## 2022-05-31 RX ORDER — ESCITALOPRAM OXALATE 20 MG/1
TABLET ORAL
COMMUNITY
End: 2022-07-31

## 2022-06-03 ENCOUNTER — TELEPHONE (OUTPATIENT)
Dept: INTERNAL MEDICINE | Facility: CLINIC | Age: 77
End: 2022-06-03

## 2022-06-03 NOTE — TELEPHONE ENCOUNTER
Patient stated the robaxin is helping wants to try it over the weekend to see if it makes a difference and will let us know Monday if she wants to try other meds.

## 2022-06-03 NOTE — TELEPHONE ENCOUNTER
Having a lot of neck spasms/pain. Was given robaxin yesterday by urologist because she was in so much pain.

## 2022-06-08 ENCOUNTER — TELEPHONE (OUTPATIENT)
Dept: INTERNAL MEDICINE | Facility: CLINIC | Age: 77
End: 2022-06-08
Payer: MEDICARE

## 2022-06-08 NOTE — TELEPHONE ENCOUNTER
"Spoke with patient at this time. Explained this is an "ok" reading and not to be alarmed. Did educate on carb intake and to limit carbs to help. Verbalized understanding.   "

## 2022-06-08 NOTE — TELEPHONE ENCOUNTER
----- Message from Marlin Dunn sent at 6/8/2022  9:58 AM CDT -----  Regarding: readings  Pt called to let you know her sugar level is 164 today, last week 123 (not on any med) please advise  [phone]

## 2022-06-21 ENCOUNTER — HOSPITAL ENCOUNTER (OUTPATIENT)
Dept: RADIOLOGY | Facility: HOSPITAL | Age: 77
Discharge: HOME | End: 2022-06-21
Attending: PHYSICIAN ASSISTANT
Payer: MEDICARE

## 2022-06-21 DIAGNOSIS — Z12.31 ENCOUNTER FOR SCREENING MAMMOGRAM FOR MALIGNANT NEOPLASM OF BREAST: ICD-10-CM

## 2022-07-21 ENCOUNTER — LAB VISIT (OUTPATIENT)
Dept: LAB | Facility: HOSPITAL | Age: 77
End: 2022-07-21
Attending: INTERNAL MEDICINE
Payer: MEDICARE

## 2022-07-21 DIAGNOSIS — E03.9 HYPOTHYROIDISM, UNSPECIFIED TYPE: ICD-10-CM

## 2022-07-21 DIAGNOSIS — E11.9 TYPE 2 DIABETES MELLITUS WITHOUT COMPLICATION, WITHOUT LONG-TERM CURRENT USE OF INSULIN: ICD-10-CM

## 2022-07-21 DIAGNOSIS — E78.5 HYPERLIPIDEMIA, UNSPECIFIED HYPERLIPIDEMIA TYPE: ICD-10-CM

## 2022-07-21 DIAGNOSIS — Z00.00 WELL ADULT EXAM: ICD-10-CM

## 2022-07-21 DIAGNOSIS — E55.9 VITAMIN D DEFICIENCY: ICD-10-CM

## 2022-07-21 DIAGNOSIS — Z00.00 WELL ADULT EXAM: Primary | ICD-10-CM

## 2022-07-21 LAB
ALBUMIN SERPL-MCNC: 4 GM/DL (ref 3.4–4.8)
ALBUMIN/GLOB SERPL: 1.5 RATIO (ref 1.1–2)
ALP SERPL-CCNC: 66 UNIT/L (ref 40–150)
ALT SERPL-CCNC: 13 UNIT/L (ref 0–55)
APPEARANCE UR: ABNORMAL
AST SERPL-CCNC: 26 UNIT/L (ref 5–34)
BACTERIA #/AREA URNS AUTO: ABNORMAL /HPF
BASOPHILS # BLD AUTO: 0.02 X10(3)/MCL (ref 0–0.2)
BASOPHILS NFR BLD AUTO: 0.3 %
BILIRUB UR QL STRIP.AUTO: NEGATIVE MG/DL
BILIRUBIN DIRECT+TOT PNL SERPL-MCNC: 0.6 MG/DL
BUN SERPL-MCNC: 19.2 MG/DL (ref 9.8–20.1)
CALCIUM SERPL-MCNC: 9.6 MG/DL (ref 8.4–10.2)
CHLORIDE SERPL-SCNC: 102 MMOL/L (ref 98–107)
CHOLEST SERPL-MCNC: 164 MG/DL
CHOLEST/HDLC SERPL: 4 {RATIO} (ref 0–5)
CO2 SERPL-SCNC: 27 MMOL/L (ref 23–31)
COLOR UR AUTO: YELLOW
CREAT SERPL-MCNC: 0.74 MG/DL (ref 0.55–1.02)
CREAT UR-MCNC: 73.7 MG/DL (ref 47–110)
DEPRECATED CALCIDIOL+CALCIFEROL SERPL-MC: 49.5 NG/ML (ref 30–80)
EOSINOPHIL # BLD AUTO: 0.14 X10(3)/MCL (ref 0–0.9)
EOSINOPHIL NFR BLD AUTO: 2.4 %
ERYTHROCYTE [DISTWIDTH] IN BLOOD BY AUTOMATED COUNT: 12.5 % (ref 11.5–17)
EST. AVERAGE GLUCOSE BLD GHB EST-MCNC: 134.1 MG/DL
GLOBULIN SER-MCNC: 2.7 GM/DL (ref 2.4–3.5)
GLUCOSE SERPL-MCNC: 128 MG/DL (ref 82–115)
GLUCOSE UR QL STRIP.AUTO: NEGATIVE MG/DL
HBA1C MFR BLD: 6.3 %
HCT VFR BLD AUTO: 44 % (ref 37–47)
HDLC SERPL-MCNC: 39 MG/DL (ref 35–60)
HGB BLD-MCNC: 14.3 GM/DL (ref 12–16)
IMM GRANULOCYTES # BLD AUTO: 0.02 X10(3)/MCL (ref 0–0.04)
IMM GRANULOCYTES NFR BLD AUTO: 0.3 %
KETONES UR QL STRIP.AUTO: NEGATIVE MG/DL
LDLC SERPL CALC-MCNC: 86 MG/DL (ref 50–140)
LEUKOCYTE ESTERASE UR QL STRIP.AUTO: ABNORMAL UNIT/L
LYMPHOCYTES # BLD AUTO: 1.58 X10(3)/MCL (ref 0.6–4.6)
LYMPHOCYTES NFR BLD AUTO: 27.1 %
MCH RBC QN AUTO: 32.2 PG (ref 27–31)
MCHC RBC AUTO-ENTMCNC: 32.5 MG/DL (ref 33–36)
MCV RBC AUTO: 99.1 FL (ref 80–94)
MICROALBUMIN UR-MCNC: 47.9 UG/ML
MICROALBUMIN/CREAT RATIO PNL UR: 65 MG/GM CR (ref 0–30)
MONOCYTES # BLD AUTO: 0.68 X10(3)/MCL (ref 0.1–1.3)
MONOCYTES NFR BLD AUTO: 11.7 %
NEUTROPHILS # BLD AUTO: 3.4 X10(3)/MCL (ref 2.1–9.2)
NEUTROPHILS NFR BLD AUTO: 58.2 %
NITRITE UR QL STRIP.AUTO: NEGATIVE
NRBC BLD AUTO-RTO: 0 %
PH UR STRIP.AUTO: 6.5 [PH]
PLATELET # BLD AUTO: 186 X10(3)/MCL (ref 130–400)
PMV BLD AUTO: 10.1 FL (ref 7.4–10.4)
POTASSIUM SERPL-SCNC: 4.7 MMOL/L (ref 3.5–5.1)
PROT SERPL-MCNC: 6.7 GM/DL (ref 5.8–7.6)
PROT UR QL STRIP.AUTO: NEGATIVE MG/DL
RBC # BLD AUTO: 4.44 X10(6)/MCL (ref 4.2–5.4)
RBC #/AREA URNS AUTO: 5 /HPF
RBC UR QL AUTO: ABNORMAL UNIT/L
SODIUM SERPL-SCNC: 138 MMOL/L (ref 136–145)
SP GR UR STRIP.AUTO: 1.01 (ref 1–1.03)
SQUAMOUS #/AREA URNS AUTO: <5 /HPF
TRIGL SERPL-MCNC: 193 MG/DL (ref 37–140)
TSH SERPL-ACNC: 0.89 UIU/ML (ref 0.35–4.94)
UROBILINOGEN UR STRIP-ACNC: 0.2 MG/DL
VLDLC SERPL CALC-MCNC: 39 MG/DL
WBC # SPEC AUTO: 5.8 X10(3)/MCL (ref 4.5–11.5)
WBC #/AREA URNS AUTO: 653 /HPF

## 2022-07-21 PROCEDURE — 80061 LIPID PANEL: CPT

## 2022-07-21 PROCEDURE — 81001 URINALYSIS AUTO W/SCOPE: CPT

## 2022-07-21 PROCEDURE — 87077 CULTURE AEROBIC IDENTIFY: CPT

## 2022-07-21 PROCEDURE — 84443 ASSAY THYROID STIM HORMONE: CPT

## 2022-07-21 PROCEDURE — 82306 VITAMIN D 25 HYDROXY: CPT

## 2022-07-21 PROCEDURE — 82043 UR ALBUMIN QUANTITATIVE: CPT

## 2022-07-21 PROCEDURE — 83036 HEMOGLOBIN GLYCOSYLATED A1C: CPT

## 2022-07-21 PROCEDURE — 85025 COMPLETE CBC W/AUTO DIFF WBC: CPT

## 2022-07-21 PROCEDURE — 36415 COLL VENOUS BLD VENIPUNCTURE: CPT

## 2022-07-21 PROCEDURE — 80053 COMPREHEN METABOLIC PANEL: CPT

## 2022-07-23 LAB — BACTERIA UR CULT: ABNORMAL

## 2022-07-28 ENCOUNTER — HOSPITAL ENCOUNTER (OUTPATIENT)
Dept: RADIOLOGY | Facility: HOSPITAL | Age: 77
Discharge: HOME | End: 2022-07-28
Attending: PHYSICIAN ASSISTANT
Payer: MEDICARE

## 2022-07-28 DIAGNOSIS — N64.89 OTHER SPECIFIED DISORDERS OF BREAST: ICD-10-CM

## 2022-07-28 PROCEDURE — 77062 MAMMO DIGITAL DIAGNOSTIC BILAT WITH TOMO: ICD-10-PCS | Mod: 26,,, | Performed by: STUDENT IN AN ORGANIZED HEALTH CARE EDUCATION/TRAINING PROGRAM

## 2022-07-28 PROCEDURE — 76642 US BREAST BILATERAL LIMITED: ICD-10-PCS | Mod: 26,50,, | Performed by: STUDENT IN AN ORGANIZED HEALTH CARE EDUCATION/TRAINING PROGRAM

## 2022-07-28 PROCEDURE — 76642 ULTRASOUND BREAST LIMITED: CPT | Mod: TC,50

## 2022-07-28 PROCEDURE — 77062 BREAST TOMOSYNTHESIS BI: CPT | Mod: TC

## 2022-07-28 PROCEDURE — 77062 BREAST TOMOSYNTHESIS BI: CPT | Mod: 26,,, | Performed by: STUDENT IN AN ORGANIZED HEALTH CARE EDUCATION/TRAINING PROGRAM

## 2022-07-28 PROCEDURE — 77066 MAMMO DIGITAL DIAGNOSTIC BILAT WITH TOMO: ICD-10-PCS | Mod: 26,,, | Performed by: STUDENT IN AN ORGANIZED HEALTH CARE EDUCATION/TRAINING PROGRAM

## 2022-07-28 PROCEDURE — 77066 DX MAMMO INCL CAD BI: CPT | Mod: 26,,, | Performed by: STUDENT IN AN ORGANIZED HEALTH CARE EDUCATION/TRAINING PROGRAM

## 2022-07-28 PROCEDURE — 76642 ULTRASOUND BREAST LIMITED: CPT | Mod: 26,50,, | Performed by: STUDENT IN AN ORGANIZED HEALTH CARE EDUCATION/TRAINING PROGRAM

## 2022-08-02 ENCOUNTER — OFFICE VISIT (OUTPATIENT)
Dept: INTERNAL MEDICINE | Facility: CLINIC | Age: 77
End: 2022-08-02
Payer: MEDICARE

## 2022-08-02 VITALS
DIASTOLIC BLOOD PRESSURE: 66 MMHG | HEART RATE: 74 BPM | BODY MASS INDEX: 25.3 KG/M2 | TEMPERATURE: 98 F | HEIGHT: 61 IN | RESPIRATION RATE: 16 BRPM | SYSTOLIC BLOOD PRESSURE: 138 MMHG | WEIGHT: 134 LBS | OXYGEN SATURATION: 95 %

## 2022-08-02 DIAGNOSIS — E03.9 HYPOTHYROIDISM, UNSPECIFIED TYPE: Primary | ICD-10-CM

## 2022-08-02 DIAGNOSIS — Z00.00 WELLNESS EXAMINATION: ICD-10-CM

## 2022-08-02 DIAGNOSIS — E11.9 TYPE 2 DIABETES MELLITUS WITHOUT COMPLICATION, WITHOUT LONG-TERM CURRENT USE OF INSULIN: ICD-10-CM

## 2022-08-02 DIAGNOSIS — Z72.0 TOBACCO USER: ICD-10-CM

## 2022-08-02 DIAGNOSIS — G47.00 INSOMNIA, UNSPECIFIED TYPE: ICD-10-CM

## 2022-08-02 DIAGNOSIS — E55.9 VITAMIN D DEFICIENCY: ICD-10-CM

## 2022-08-02 DIAGNOSIS — I10 ESSENTIAL HYPERTENSION: ICD-10-CM

## 2022-08-02 DIAGNOSIS — I73.9 PERIPHERAL VASCULAR DISEASE: ICD-10-CM

## 2022-08-02 DIAGNOSIS — M54.2 NECK PAIN: ICD-10-CM

## 2022-08-02 PROCEDURE — 96372 THER/PROPH/DIAG INJ SC/IM: CPT | Mod: ,,, | Performed by: INTERNAL MEDICINE

## 2022-08-02 PROCEDURE — 96372 PR INJECTION,THERAP/PROPH/DIAG2ST, IM OR SUBCUT: ICD-10-PCS | Mod: ,,, | Performed by: INTERNAL MEDICINE

## 2022-08-02 PROCEDURE — 99499 UNLISTED E&M SERVICE: CPT | Mod: ,,, | Performed by: INTERNAL MEDICINE

## 2022-08-02 PROCEDURE — 99499 NO LOS: ICD-10-PCS | Mod: ,,, | Performed by: INTERNAL MEDICINE

## 2022-08-02 RX ORDER — KETOROLAC TROMETHAMINE 30 MG/ML
60 INJECTION, SOLUTION INTRAMUSCULAR; INTRAVENOUS ONCE
Status: COMPLETED | OUTPATIENT
Start: 2022-08-02 | End: 2022-08-02

## 2022-08-02 RX ORDER — CIPROFLOXACIN 500 MG/1
500 TABLET ORAL 2 TIMES DAILY
Qty: 10 TABLET | Refills: 0 | Status: SHIPPED | OUTPATIENT
Start: 2022-08-02 | End: 2022-08-07

## 2022-08-02 RX ORDER — ESCITALOPRAM OXALATE 20 MG/1
20 TABLET ORAL DAILY
Qty: 90 TABLET | Refills: 3 | Status: SHIPPED | OUTPATIENT
Start: 2022-08-02 | End: 2023-08-23

## 2022-08-02 RX ORDER — GLUCOSAM/CHONDRO/HERB 149/HYAL 750-100 MG
TABLET ORAL
COMMUNITY

## 2022-08-02 RX ADMIN — KETOROLAC TROMETHAMINE 60 MG: 30 INJECTION, SOLUTION INTRAMUSCULAR; INTRAVENOUS at 02:08

## 2022-08-02 NOTE — PROGRESS NOTES
"Subjective:       Patient ID: Marlin Herrera is a 77 y.o. female.    Chief Complaint: Medicare AWV Follow Up, Hypertension, Hyperlipidemia, Gastroesophageal Reflux, and Hypothyroidism    77-year-old female seen today for followup of diabetes, hypertension, hypothyroidism, and hyperlipidemia among other conditions.     Review of Systems   Constitutional: Negative for fever.   HENT: Negative for nosebleeds.    Eyes: Negative for visual disturbance.   Respiratory: Negative for shortness of breath.    Cardiovascular: Negative for chest pain.   Gastrointestinal: Negative for abdominal pain.   Genitourinary: Negative for dysuria.   Musculoskeletal: Negative for gait problem.   Neurological: Negative for headaches.         Objective:      Physical Exam  HENT:      Head: Normocephalic.      Mouth/Throat:      Pharynx: Oropharynx is clear.   Eyes:      Extraocular Movements: Extraocular movements intact.   Cardiovascular:      Rate and Rhythm: Normal rate and regular rhythm.   Pulmonary:      Breath sounds: Normal breath sounds.   Abdominal:      Palpations: Abdomen is soft.   Musculoskeletal:         General: No swelling.   Skin:     General: Skin is warm.   Neurological:      General: No focal deficit present.      Mental Status: She is alert and oriented to person, place, and time.   Psychiatric:         Mood and Affect: Mood normal.         Vitals:    08/02/22 1348   BP: 138/66   Pulse: 74   Resp: 16   Temp: 98.1 °F (36.7 °C)   SpO2: 95%   Weight: 60.8 kg (134 lb)   Height: 5' 1" (1.549 m)      Assessment:       Problem List Items Addressed This Visit        Cardiac/Vascular    Essential hypertension    Relevant Orders    CBC Auto Differential    Comprehensive Metabolic Panel    Lipid Panel    Microalbumin/Creatinine Ratio, Urine    Urinalysis, Reflex to Urine Culture Urine, Clean Catch    T4, Free    TSH    Hemoglobin A1C    Peripheral vascular disease    Relevant Orders    CBC Auto Differential    Comprehensive " Metabolic Panel    Lipid Panel    Microalbumin/Creatinine Ratio, Urine    Urinalysis, Reflex to Urine Culture Urine, Clean Catch    T4, Free    TSH    Hemoglobin A1C       Endocrine    Diabetes mellitus    Relevant Orders    CBC Auto Differential    Comprehensive Metabolic Panel    Lipid Panel    Microalbumin/Creatinine Ratio, Urine    Urinalysis, Reflex to Urine Culture Urine, Clean Catch    T4, Free    TSH    Hemoglobin A1C    Hypothyroidism - Primary    Relevant Orders    CBC Auto Differential    Comprehensive Metabolic Panel    Lipid Panel    Microalbumin/Creatinine Ratio, Urine    Urinalysis, Reflex to Urine Culture Urine, Clean Catch    T4, Free    TSH    Hemoglobin A1C    Vitamin D deficiency    Relevant Orders    CBC Auto Differential    Comprehensive Metabolic Panel    Lipid Panel    Microalbumin/Creatinine Ratio, Urine    Urinalysis, Reflex to Urine Culture Urine, Clean Catch    T4, Free    TSH    Hemoglobin A1C       Other    Insomnia    Relevant Orders    CBC Auto Differential    Comprehensive Metabolic Panel    Lipid Panel    Microalbumin/Creatinine Ratio, Urine    Urinalysis, Reflex to Urine Culture Urine, Clean Catch    T4, Free    TSH    Hemoglobin A1C    Tobacco user    Relevant Orders    CBC Auto Differential    Comprehensive Metabolic Panel    Lipid Panel    Microalbumin/Creatinine Ratio, Urine    Urinalysis, Reflex to Urine Culture Urine, Clean Catch    T4, Free    TSH    Hemoglobin A1C    Wellness examination    Relevant Orders    CBC Auto Differential    Comprehensive Metabolic Panel    Lipid Panel    Microalbumin/Creatinine Ratio, Urine    Urinalysis, Reflex to Urine Culture Urine, Clean Catch    T4, Free    TSH    Hemoglobin A1C          Medication List with Changes/Refills   New Medications    CIPROFLOXACIN HCL (CIPRO) 500 MG TABLET    Take 1 tablet (500 mg total) by mouth 2 (two) times daily. for 5 days   Current Medications    AMITRIPTYLINE (ELAVIL) 25 MG TABLET    amitriptyline 25 mg  tablet    AMLODIPINE (NORVASC) 5 MG TABLET    Take 5 mg by mouth 2 (two) times daily.    ASPIRIN (ECOTRIN) 81 MG EC TABLET    aspirin 81 mg tablet,delayed release   Take 1 tablet every day by oral route.    BLOOD SUGAR DIAGNOSTIC STRP      Free style Lite Test strips, See Instructions, DX: E11.9 to test sugar daily, # 100 EA, 11 Refill(s)    CALCIUM CARBONATE (OS-YRN) 600 MG CALCIUM (1,500 MG) TAB    Calcium 600 mg calcium (1,500 mg) tablet   Take 1 tablet every day by oral route.    ESOMEPRAZOLE (NEXIUM) 40 MG CAPSULE    Nexium 40 mg capsule,delayed release   Take 1 capsule every day by oral route.    LETROZOLE (FEMARA) 2.5 MG TAB    Take 1 tablet (2.5 mg total) by mouth once daily.    LEVOTHYROXINE (SYNTHROID) 88 MCG TABLET    levothyroxine 88 mcg tablet    LOSARTAN-HYDROCHLOROTHIAZIDE 100-12.5 MG (HYZAAR) 100-12.5 MG TAB    losartan 100 mg-hydrochlorothiazide 12.5 mg tablet   Take 1 tablet every day by oral route for 90 days.    MELOXICAM (MOBIC) 7.5 MG TABLET    Take 7.5 mg by mouth once daily.    METOPROLOL SUCCINATE (TOPROL-XL) 100 MG 24 HR TABLET    Take 100 mg by mouth once daily.    OMEGA 3-DHA-EPA-FISH OIL 1,000 MG (120 MG-180 MG) CAP    Fish Oil 1,000 mg (120 mg-180 mg) capsule   Take 1 capsule every day by oral route.    ROSUVASTATIN (CRESTOR) 10 MG TABLET    TAKE 1 TABLET BY MOUTH EVERY DAY IN EVENING    TIZANIDINE (ZANAFLEX) 4 MG TABLET    tizanidine 4 mg tablet   Changed and/or Refilled Medications    Modified Medication Previous Medication    ESCITALOPRAM OXALATE (LEXAPRO) 20 MG TABLET EScitalopram oxalate (LEXAPRO) 20 MG tablet       Take 1 tablet (20 mg total) by mouth once daily.    TAKE 1 TABLET BY MOUTH EVERY DAY   Discontinued Medications    LOSARTAN (COZAAR) 100 MG TABLET    losartan 100 mg tablet   Take 1 tablet every day by oral route for 90 days.        Plan:       1. Diabetes: Hemoglobin A1c is 6.3. Metformin was stopped in 2001 because of diarrhea or dyspepsia. She is no longer on  medication, yet her glucose is very well controlled    2. Hypertension: Stable    3. Hyperlipidemia: LDL at goal. Calcium score 1.3 in 2018    4. Cervical spinal stenosis: She had surgery in . Continue meloxicam. She saw pain management once (Dr. Smallwood assistant) in  and they suggested Toradol injection PRN. She is requesting Toradol today    5. Murguia's esophagus: EGD in 2021 with Dr. Felton    6. Gastroesophageal reflux disease: Restart Nexium    7. Hypothyroidism: TSH normal    8. Insomnia: She has worsening insomnia. Decreased amitriptyline back to 25mg because of drowsiness at last visit    9. Vitamin D deficiency: Continue vitamin D    10. Osteoarthritis: L knee pain worsening. Referred to Ortho previously, considering knee replacement    11. Urinary tract infection: Discolored urine lately, start Cipro    12. Tobacco use: She smokes half a pack per day. Lungs screening CT negative in 3/2022    13. Stage IA left breast cancer: Diagnosed in 2019 status post mastectomy. Continue Femara. Imaging 2022    14. Peripheral vascular disease: CT angiogram of the lower extremities showed diffuse disease. She has claudication and pain in her lower back and right leg. She will F/U with Dr. Steinberg. Restart aspirin 81mg daily    15. Vitiligo: Her brother has vitiligo as well    16. Wellness: Colonoscopy normal in 2020. Pneumovax 2021. MMG/ultrasound 2022      Her partner, Mr. Madison, had metastatic lung cancer and  in 2019

## 2022-11-01 LAB
LEFT EYE DM RETINOPATHY: NEGATIVE
RIGHT EYE DM RETINOPATHY: NEGATIVE

## 2022-11-02 ENCOUNTER — DOCUMENTATION ONLY (OUTPATIENT)
Dept: INTERNAL MEDICINE | Facility: CLINIC | Age: 77
End: 2022-11-02

## 2022-11-07 PROBLEM — Z00.00 WELLNESS EXAMINATION: Status: RESOLVED | Noted: 2022-08-02 | Resolved: 2022-11-07

## 2022-11-29 PROBLEM — I83.90 VARICOSE VEINS OF LOWER EXTREMITY: Status: ACTIVE | Noted: 2022-09-29

## 2022-12-01 ENCOUNTER — TELEPHONE (OUTPATIENT)
Dept: HEMATOLOGY/ONCOLOGY | Facility: CLINIC | Age: 77
End: 2022-12-01
Payer: MEDICARE

## 2023-01-20 NOTE — PROGRESS NOTES
Subjective:       Patient ID: Marlin Herrera is a 77 y.o. female.    Surgeon: Dr. Emelina Leonard    Left Breast Cancer Stage IA ([id number]) Diagnosed 5/22/19  Biopsy/pathology: Left breast 6:00 periareolar lesion biopsy done 5/22/19--invasive ductal carcinoma Grade 2, DCIS Grade 2, proliferative fibrocystic changes with multifocal atypical lobular hyperplasia, ER 99.58%, PA 47.36%, Her2 Equivocal by IHC 2+, negative by FISH.  Surgery/pathology: Left breast mastectomy done 7/8/19--infiltrating ductal carcinoma, NOS type, Grade 2, unifocal measures 1cm in greatest dimension, associated foci of DCIS grade 2, 8mm in greatest extent, margins free, no vascular invasion, 5 sentinel and 1 axillary lymph node all negative (T1bN0).  Imaging:  Screening MMG 4/17/19--developing focal asymmetry with calcifications in left breast at 6:00, needs additional imaging BIRADS 0.  Left breast Reji diagnostic MMG/US done 5/14/19--Left breast 6:00 periareolar position mass measures 0.9X1.6X0.6cm with associated calcifications, suspicious BIRADS 4.  CT chest LD screening done 3/25/2021--Stable tiny left lower lobe nodule, few punctate nodules not clearly seen on prior, for example 3 mm right upper lobe nodule, right upper lobe 2 mm nodule anteriorly, benign, recommend repeat in 12 months.    DEXA:  9/26/19--AP Spine T= -0.2, Femur neck left 0.1, right 0.2, Femur total left 0.6, right 0.0 Normal bone density.  9/30/21--AP Spine T= -0.4, Femur neck left 0.1, right 0.4, Femur total left 0.2, right -0.3, normal bone density, mixed findings.    Treatment plan: Femara X 5-10 years started 8/15/19.         Chief Complaint: Other Misc (Pt reports no new concerns today.)    HPI   Patient presents for follow-up of breast cancer. She continues on the Femara without any problems. No new complaints reported. She is still smoking but she has cut back. She does have claudication and is being followed by vascular surgery. States she was having some  problems with heartburn but this is better with Nexium. No other problems reported.     Past Medical History:   Diagnosis Date    Breast cancer     DM (diabetes mellitus)     GERD (gastroesophageal reflux disease)     Hypothyroidism, unspecified     Mixed hyperlipidemia     PVD (peripheral vascular disease)       Review of patient's allergies indicates:   Allergen Reactions    Ace inhibitors Swelling    Metformin Diarrhea    Phenazopyridine     Nitrofurantoin monohyd/m-cryst Rash        Current Outpatient Medications:     amitriptyline (ELAVIL) 25 MG tablet, TAKE 1 TABLET BY MOUTH EVERY DAY AT BEDTIME, Disp: 90 tablet, Rfl: 3    amLODIPine (NORVASC) 5 MG tablet, TAKE 1 TABLET BY MOUTH TWICE A DAY, Disp: 180 tablet, Rfl: 5    aspirin (ECOTRIN) 81 MG EC tablet, aspirin 81 mg tablet,delayed release  Take 1 tablet every day by oral route., Disp: , Rfl:     blood sugar diagnostic Strp,  Free style Lite Test strips, See Instructions, DX: E11.9 to test sugar daily, # 100 EA, 11 Refill(s), Disp: , Rfl:     EScitalopram oxalate (LEXAPRO) 20 MG tablet, Take 1 tablet (20 mg total) by mouth once daily., Disp: 90 tablet, Rfl: 3    esomeprazole (NEXIUM) 40 MG capsule, Nexium 40 mg capsule,delayed release  Take 1 capsule every day by oral route., Disp: , Rfl:     letrozole (FEMARA) 2.5 mg Tab, TAKE 1 TABLET BY MOUTH ONCE A DAY, Disp: 30 tablet, Rfl: 1    levothyroxine (SYNTHROID) 88 MCG tablet, TAKE 1 TABLET BY MOUTH EVERY DAY, Disp: 90 tablet, Rfl: 3    losartan-hydrochlorothiazide 100-12.5 mg (HYZAAR) 100-12.5 mg Tab, TAKE 1 TABLET BY MOUTH EVERY DAY, Disp: 90 tablet, Rfl: 4    meloxicam (MOBIC) 7.5 MG tablet, Take 7.5 mg by mouth once daily., Disp: , Rfl:     metoprolol succinate (TOPROL-XL) 100 MG 24 hr tablet, Take 100 mg by mouth once daily., Disp: , Rfl:     omega 3-dha-epa-fish oil 1,000 mg (120 mg-180 mg) Cap, Fish Oil 1,000 mg (120 mg-180 mg) capsule  Take 1 capsule every day by oral route., Disp: , Rfl:     rosuvastatin  (CRESTOR) 10 MG tablet, TAKE 1 TABLET BY MOUTH EVERY DAY IN EVENING, Disp: 90 tablet, Rfl: 4    calcium carbonate (OS-YRN) 600 mg calcium (1,500 mg) Tab, Calcium 600 mg calcium (1,500 mg) tablet  Take 1 tablet every day by oral route., Disp: , Rfl:     tiZANidine (ZANAFLEX) 4 MG tablet, tizanidine 4 mg tablet, Disp: , Rfl:   Review of Systems   Constitutional:  Positive for fatigue. Negative for activity change, fever and unexpected weight change.   Eyes:  Negative for visual disturbance.   Respiratory:  Negative for cough and shortness of breath.    Cardiovascular:  Negative for chest pain.        PVD with claudication pain   Gastrointestinal:  Negative for abdominal pain, blood in stool, constipation, diarrhea, nausea and vomiting.   Genitourinary:  Negative for difficulty urinating.   Musculoskeletal:  Positive for neck pain and neck stiffness. Negative for back pain.   Integumentary:  Negative for rash.   Neurological:  Negative for dizziness, weakness and headaches.   Psychiatric/Behavioral:  Negative for behavioral problems and suicidal ideas.        Vitals:    01/26/23 1321   BP: (!) 129/54   Pulse: 76   Resp: 14   Temp: 98.1 °F (36.7 °C)        Physical Exam  Vitals reviewed.   Constitutional:       Appearance: Normal appearance. She is normal weight.   HENT:      Head: Normocephalic.   Eyes:      General: Lids are normal. Vision grossly intact.      Extraocular Movements: Extraocular movements intact.      Conjunctiva/sclera: Conjunctivae normal.   Neck:      Comments: Neck pain and limited ROM  Cardiovascular:      Rate and Rhythm: Normal rate and regular rhythm.      Pulses: Normal pulses.      Heart sounds: Normal heart sounds, S1 normal and S2 normal.   Pulmonary:      Effort: Pulmonary effort is normal.      Breath sounds: Normal breath sounds.   Chest:   Breasts:     Left: Absent.      Comments: Left chest wall s/p mastectomy. Right breast normal. No palpable axillary adenopathy  bilaterally  Abdominal:      General: Abdomen is protuberant. Bowel sounds are normal.      Palpations: Abdomen is soft.   Musculoskeletal:      Cervical back: Rigidity present. Pain with movement present.      Thoracic back: Normal.      Lumbar back: Normal.   Feet:      Right foot:      Skin integrity: Skin integrity normal.   Skin:     General: Skin is warm.      Capillary Refill: Capillary refill takes less than 2 seconds.   Neurological:      Mental Status: She is alert.   Psychiatric:         Attention and Perception: Attention normal.         Mood and Affect: Mood normal.         Speech: Speech normal.         Behavior: Behavior normal. Behavior is cooperative.         Cognition and Memory: Cognition normal.         Judgment: Judgment normal.     ECOG SCORE    1 - Restricted in strenuous activity-ambulatory and able to carry out work of a light nature       Lab Visit on 01/26/2023   Component Date Value    WBC 01/26/2023 6.9     RBC 01/26/2023 4.13 (L)     Hgb 01/26/2023 13.3     Hct 01/26/2023 41.5     MCV 01/26/2023 100.5 (H)     MCH 01/26/2023 32.2     MCHC 01/26/2023 32.0 (L)     RDW 01/26/2023 11.9     Platelet 01/26/2023 168     MPV 01/26/2023 9.8     Neut % 01/26/2023 60.5     Lymph % 01/26/2023 26.1     Mono % 01/26/2023 11.0     Eos % 01/26/2023 1.9     Basophil % 01/26/2023 0.4     Lymph # 01/26/2023 1.81     Neut # 01/26/2023 4.19     Mono # 01/26/2023 0.76     Eos # 01/26/2023 0.13     Baso # 01/26/2023 0.03     IG# 01/26/2023 0.01     IG% 01/26/2023 0.1           Assessment:       Problem List Items Addressed This Visit          Oncology    Primary malignant neoplasm of breast - Primary       Other    Tobacco user     Other Visit Diagnoses       Tobacco use disorder, severe, dependence        Cigarette nicotine dependence with other nicotine-induced disorder        Nicotine dependence, uncomplicated, unspecified nicotine product type        Encounter for screening mammogram for breast cancer         Relevant Orders    Mammo Digital Screening Right with Reji    Post menopausal syndrome        Relevant Orders    DXA Bone Density Spine And Hip    Disorder of bone density and structure, unspecified        Relevant Orders    DXA Bone Density Spine And Hip    Other specified disorders of bone density and structure, multiple sites        Relevant Orders    DXA Bone Density Spine And Hip               Plan:       Patient with stage IA left breast cancer s/p mastectomy done 7/8/19. Tumor measured 1cm, Grade 2, ER+ and [place]+ and Her2 negative, node negative.  Per NCCN guidelines, can consider Oncotype DX testing.  However, due to patient's age and small size of tumor, chemotherapy felt to be of limited benefit.  I recommended treatment with AI X 5-10 years.    Currently patient is doing well without any signs or symptoms to suggest disease recurrence.  Patient started Femara on 8/15/19 and is tolerating well.   CBC today is unremarkable. CMP pending.   Continue Femara.  Continue Calcium and vitamin D.  DEXA from 9/26/19 normal. Repeat from 9/2021 with continued normal bone density, mixed response. Will schedule next for September 2023.   Bilateral Reji diagnostic MMG and US on 7/28/22 (left was done and US due to patient complaining of area of concern to her left chest wall - this area was consistent with her rib) benign findings with recommendations for right breast screening in 1 year. Will send orders.   LDCT scan of chest for lung cancer surveillance done on 4/7/22 was Category 1, benign. Plan to repeat in 1 year - attempted to schedule but it would not allow since she will turn 78 this year.   RTC 6 months for follow-up with labs.  Smoking cessation encouraged.    All questions answered at this time.      Davian Nash, ALBERTO

## 2023-01-26 ENCOUNTER — OFFICE VISIT (OUTPATIENT)
Dept: HEMATOLOGY/ONCOLOGY | Facility: CLINIC | Age: 78
End: 2023-01-26
Payer: MEDICARE

## 2023-01-26 ENCOUNTER — LAB VISIT (OUTPATIENT)
Dept: LAB | Facility: HOSPITAL | Age: 78
End: 2023-01-26
Attending: INTERNAL MEDICINE
Payer: MEDICARE

## 2023-01-26 VITALS
HEIGHT: 61 IN | RESPIRATION RATE: 14 BRPM | HEART RATE: 76 BPM | SYSTOLIC BLOOD PRESSURE: 129 MMHG | BODY MASS INDEX: 25.71 KG/M2 | WEIGHT: 136.19 LBS | TEMPERATURE: 98 F | OXYGEN SATURATION: 97 % | DIASTOLIC BLOOD PRESSURE: 54 MMHG

## 2023-01-26 DIAGNOSIS — F17.200 NICOTINE DEPENDENCE, UNCOMPLICATED, UNSPECIFIED NICOTINE PRODUCT TYPE: ICD-10-CM

## 2023-01-26 DIAGNOSIS — N95.1 POST MENOPAUSAL SYNDROME: ICD-10-CM

## 2023-01-26 DIAGNOSIS — Z72.0 TOBACCO USER: ICD-10-CM

## 2023-01-26 DIAGNOSIS — Z12.31 ENCOUNTER FOR SCREENING MAMMOGRAM FOR BREAST CANCER: ICD-10-CM

## 2023-01-26 DIAGNOSIS — F17.218 CIGARETTE NICOTINE DEPENDENCE WITH OTHER NICOTINE-INDUCED DISORDER: ICD-10-CM

## 2023-01-26 DIAGNOSIS — C50.912 PRIMARY MALIGNANT NEOPLASM OF LEFT BREAST: ICD-10-CM

## 2023-01-26 DIAGNOSIS — F17.200 TOBACCO USE DISORDER, SEVERE, DEPENDENCE: ICD-10-CM

## 2023-01-26 DIAGNOSIS — M85.89 OTHER SPECIFIED DISORDERS OF BONE DENSITY AND STRUCTURE, MULTIPLE SITES: ICD-10-CM

## 2023-01-26 DIAGNOSIS — C50.912 PRIMARY MALIGNANT NEOPLASM OF LEFT BREAST: Primary | ICD-10-CM

## 2023-01-26 DIAGNOSIS — M85.9 DISORDER OF BONE DENSITY AND STRUCTURE, UNSPECIFIED: ICD-10-CM

## 2023-01-26 LAB
ALBUMIN SERPL-MCNC: 3.9 G/DL (ref 3.4–4.8)
ALBUMIN/GLOB SERPL: 1.4 RATIO (ref 1.1–2)
ALP SERPL-CCNC: 74 UNIT/L (ref 40–150)
ALT SERPL-CCNC: 14 UNIT/L (ref 0–55)
AST SERPL-CCNC: 22 UNIT/L (ref 5–34)
BASOPHILS # BLD AUTO: 0.03 X10(3)/MCL (ref 0–0.2)
BASOPHILS NFR BLD AUTO: 0.4 %
BILIRUBIN DIRECT+TOT PNL SERPL-MCNC: 0.5 MG/DL
BUN SERPL-MCNC: 18.4 MG/DL (ref 9.8–20.1)
CALCIUM SERPL-MCNC: 9.8 MG/DL (ref 8.4–10.2)
CHLORIDE SERPL-SCNC: 102 MMOL/L (ref 98–107)
CO2 SERPL-SCNC: 29 MMOL/L (ref 23–31)
CREAT SERPL-MCNC: 0.76 MG/DL (ref 0.55–1.02)
EOSINOPHIL # BLD AUTO: 0.13 X10(3)/MCL (ref 0–0.9)
EOSINOPHIL NFR BLD AUTO: 1.9 %
ERYTHROCYTE [DISTWIDTH] IN BLOOD BY AUTOMATED COUNT: 11.9 % (ref 11.5–17)
GFR SERPLBLD CREATININE-BSD FMLA CKD-EPI: >60 MLS/MIN/1.73/M2
GLOBULIN SER-MCNC: 2.8 GM/DL (ref 2.4–3.5)
GLUCOSE SERPL-MCNC: 138 MG/DL (ref 82–115)
HCT VFR BLD AUTO: 41.5 % (ref 37–47)
HGB BLD-MCNC: 13.3 GM/DL (ref 12–16)
IMM GRANULOCYTES # BLD AUTO: 0.01 X10(3)/MCL (ref 0–0.04)
IMM GRANULOCYTES NFR BLD AUTO: 0.1 %
LYMPHOCYTES # BLD AUTO: 1.81 X10(3)/MCL (ref 0.6–4.6)
LYMPHOCYTES NFR BLD AUTO: 26.1 %
MCH RBC QN AUTO: 32.2 PG
MCHC RBC AUTO-ENTMCNC: 32 MG/DL (ref 33–36)
MCV RBC AUTO: 100.5 FL (ref 80–94)
MONOCYTES # BLD AUTO: 0.76 X10(3)/MCL (ref 0.1–1.3)
MONOCYTES NFR BLD AUTO: 11 %
NEUTROPHILS # BLD AUTO: 4.19 X10(3)/MCL (ref 2.1–9.2)
NEUTROPHILS NFR BLD AUTO: 60.5 %
PLATELET # BLD AUTO: 168 X10(3)/MCL (ref 130–400)
PMV BLD AUTO: 9.8 FL (ref 7.4–10.4)
POTASSIUM SERPL-SCNC: 4.6 MMOL/L (ref 3.5–5.1)
PROT SERPL-MCNC: 6.7 GM/DL (ref 5.8–7.6)
RBC # BLD AUTO: 4.13 X10(6)/MCL (ref 4.2–5.4)
SODIUM SERPL-SCNC: 140 MMOL/L (ref 136–145)
WBC # SPEC AUTO: 6.9 X10(3)/MCL (ref 4.5–11.5)

## 2023-01-26 PROCEDURE — 85025 COMPLETE CBC W/AUTO DIFF WBC: CPT

## 2023-01-26 PROCEDURE — 80053 COMPREHEN METABOLIC PANEL: CPT

## 2023-01-26 PROCEDURE — 99213 PR OFFICE/OUTPT VISIT, EST, LEVL III, 20-29 MIN: ICD-10-PCS | Mod: S$PBB,,, | Performed by: NURSE PRACTITIONER

## 2023-01-26 PROCEDURE — 99213 OFFICE O/P EST LOW 20 MIN: CPT | Mod: PBBFAC | Performed by: NURSE PRACTITIONER

## 2023-01-26 PROCEDURE — 99213 OFFICE O/P EST LOW 20 MIN: CPT | Mod: S$PBB,,, | Performed by: NURSE PRACTITIONER

## 2023-01-26 PROCEDURE — 99999 PR PBB SHADOW E&M-EST. PATIENT-LVL III: CPT | Mod: PBBFAC,,, | Performed by: NURSE PRACTITIONER

## 2023-01-26 PROCEDURE — 36415 COLL VENOUS BLD VENIPUNCTURE: CPT

## 2023-01-26 PROCEDURE — 99999 PR PBB SHADOW E&M-EST. PATIENT-LVL III: ICD-10-PCS | Mod: PBBFAC,,, | Performed by: NURSE PRACTITIONER

## 2023-01-27 ENCOUNTER — TELEPHONE (OUTPATIENT)
Dept: HEMATOLOGY/ONCOLOGY | Facility: CLINIC | Age: 78
End: 2023-01-27
Payer: MEDICARE

## 2023-01-30 ENCOUNTER — TELEPHONE (OUTPATIENT)
Dept: INTERNAL MEDICINE | Facility: CLINIC | Age: 78
End: 2023-01-30
Payer: MEDICARE

## 2023-01-30 DIAGNOSIS — Z00.00 WELLNESS EXAMINATION: ICD-10-CM

## 2023-01-30 DIAGNOSIS — K21.9 GASTROESOPHAGEAL REFLUX DISEASE, UNSPECIFIED WHETHER ESOPHAGITIS PRESENT: Primary | ICD-10-CM

## 2023-01-30 RX ORDER — ESOMEPRAZOLE MAGNESIUM 40 MG/1
40 CAPSULE, DELAYED RELEASE ORAL DAILY
Qty: 90 CAPSULE | Refills: 3 | Status: SHIPPED | OUTPATIENT
Start: 2023-01-30

## 2023-01-30 RX ORDER — AMITRIPTYLINE HYDROCHLORIDE 25 MG/1
25 TABLET, FILM COATED ORAL NIGHTLY
Qty: 90 TABLET | Refills: 3 | Status: SHIPPED | OUTPATIENT
Start: 2023-01-30 | End: 2024-01-26 | Stop reason: SDUPTHER

## 2023-01-31 ENCOUNTER — LAB VISIT (OUTPATIENT)
Dept: LAB | Facility: HOSPITAL | Age: 78
End: 2023-01-31
Attending: INTERNAL MEDICINE
Payer: MEDICARE

## 2023-01-31 DIAGNOSIS — Z00.00 WELLNESS EXAMINATION: ICD-10-CM

## 2023-01-31 DIAGNOSIS — I10 ESSENTIAL HYPERTENSION: ICD-10-CM

## 2023-01-31 DIAGNOSIS — E03.9 HYPOTHYROIDISM, UNSPECIFIED TYPE: ICD-10-CM

## 2023-01-31 DIAGNOSIS — E55.9 VITAMIN D DEFICIENCY: ICD-10-CM

## 2023-01-31 DIAGNOSIS — Z72.0 TOBACCO USER: ICD-10-CM

## 2023-01-31 DIAGNOSIS — E11.9 TYPE 2 DIABETES MELLITUS WITHOUT COMPLICATION, WITHOUT LONG-TERM CURRENT USE OF INSULIN: ICD-10-CM

## 2023-01-31 DIAGNOSIS — I73.9 PERIPHERAL VASCULAR DISEASE: ICD-10-CM

## 2023-01-31 DIAGNOSIS — G47.00 INSOMNIA, UNSPECIFIED TYPE: ICD-10-CM

## 2023-01-31 LAB
ALBUMIN SERPL-MCNC: 4 G/DL (ref 3.4–4.8)
ALBUMIN/GLOB SERPL: 1.4 RATIO (ref 1.1–2)
ALP SERPL-CCNC: 69 UNIT/L (ref 40–150)
ALT SERPL-CCNC: 17 UNIT/L (ref 0–55)
APPEARANCE UR: CLEAR
AST SERPL-CCNC: 27 UNIT/L (ref 5–34)
BACTERIA #/AREA URNS AUTO: NORMAL /HPF
BASOPHILS # BLD AUTO: 0.03 X10(3)/MCL (ref 0–0.2)
BASOPHILS NFR BLD AUTO: 0.5 %
BILIRUB UR QL STRIP.AUTO: NEGATIVE MG/DL
BILIRUBIN DIRECT+TOT PNL SERPL-MCNC: 0.6 MG/DL
BUN SERPL-MCNC: 21.9 MG/DL (ref 9.8–20.1)
CALCIUM SERPL-MCNC: 9.8 MG/DL (ref 8.4–10.2)
CHLORIDE SERPL-SCNC: 101 MMOL/L (ref 98–107)
CHOLEST SERPL-MCNC: 184 MG/DL
CHOLEST/HDLC SERPL: 5 {RATIO} (ref 0–5)
CO2 SERPL-SCNC: 30 MMOL/L (ref 23–31)
COLOR UR AUTO: YELLOW
CREAT SERPL-MCNC: 0.73 MG/DL (ref 0.55–1.02)
CREAT UR-MCNC: 56.9 MG/DL (ref 47–110)
EOSINOPHIL # BLD AUTO: 0.18 X10(3)/MCL (ref 0–0.9)
EOSINOPHIL NFR BLD AUTO: 3.1 %
ERYTHROCYTE [DISTWIDTH] IN BLOOD BY AUTOMATED COUNT: 12 % (ref 11.5–17)
EST. AVERAGE GLUCOSE BLD GHB EST-MCNC: 137 MG/DL
GFR SERPLBLD CREATININE-BSD FMLA CKD-EPI: >60 MLS/MIN/1.73/M2
GLOBULIN SER-MCNC: 2.8 GM/DL (ref 2.4–3.5)
GLUCOSE SERPL-MCNC: 132 MG/DL (ref 82–115)
GLUCOSE UR QL STRIP.AUTO: NEGATIVE MG/DL
HBA1C MFR BLD: 6.4 %
HCT VFR BLD AUTO: 43 % (ref 37–47)
HDLC SERPL-MCNC: 36 MG/DL (ref 35–60)
HGB BLD-MCNC: 13.7 GM/DL (ref 12–16)
IMM GRANULOCYTES # BLD AUTO: 0.01 X10(3)/MCL (ref 0–0.04)
IMM GRANULOCYTES NFR BLD AUTO: 0.2 %
KETONES UR QL STRIP.AUTO: NEGATIVE MG/DL
LDLC SERPL CALC-MCNC: 84 MG/DL (ref 50–140)
LEUKOCYTE ESTERASE UR QL STRIP.AUTO: NEGATIVE UNIT/L
LYMPHOCYTES # BLD AUTO: 1.65 X10(3)/MCL (ref 0.6–4.6)
LYMPHOCYTES NFR BLD AUTO: 28.4 %
MCH RBC QN AUTO: 31.6 PG
MCHC RBC AUTO-ENTMCNC: 31.9 MG/DL (ref 33–36)
MCV RBC AUTO: 99.3 FL (ref 80–94)
MICROALBUMIN UR-MCNC: 10.2 UG/ML
MICROALBUMIN/CREAT RATIO PNL UR: 17.9 MG/GM CR (ref 0–30)
MONOCYTES # BLD AUTO: 0.68 X10(3)/MCL (ref 0.1–1.3)
MONOCYTES NFR BLD AUTO: 11.7 %
NEUTROPHILS # BLD AUTO: 3.27 X10(3)/MCL (ref 2.1–9.2)
NEUTROPHILS NFR BLD AUTO: 56.1 %
NITRITE UR QL STRIP.AUTO: NEGATIVE
NRBC BLD AUTO-RTO: 0 %
PH UR STRIP.AUTO: 7 [PH]
PLATELET # BLD AUTO: 175 X10(3)/MCL (ref 130–400)
PMV BLD AUTO: 10 FL (ref 7.4–10.4)
POTASSIUM SERPL-SCNC: 4.8 MMOL/L (ref 3.5–5.1)
PROT SERPL-MCNC: 6.8 GM/DL (ref 5.8–7.6)
PROT UR QL STRIP.AUTO: NEGATIVE MG/DL
RBC # BLD AUTO: 4.33 X10(6)/MCL (ref 4.2–5.4)
RBC #/AREA URNS AUTO: <5 /HPF
RBC UR QL AUTO: NEGATIVE UNIT/L
SODIUM SERPL-SCNC: 139 MMOL/L (ref 136–145)
SP GR UR STRIP.AUTO: 1.01 (ref 1–1.03)
SQUAMOUS #/AREA URNS AUTO: <5 /HPF
T4 FREE SERPL-MCNC: 0.99 NG/DL (ref 0.7–1.48)
TRIGL SERPL-MCNC: 320 MG/DL (ref 37–140)
TSH SERPL-ACNC: 0.79 UIU/ML (ref 0.35–4.94)
UROBILINOGEN UR STRIP-ACNC: 0.2 MG/DL
VLDLC SERPL CALC-MCNC: 64 MG/DL
WBC # SPEC AUTO: 5.8 X10(3)/MCL (ref 4.5–11.5)
WBC #/AREA URNS AUTO: <5 /HPF

## 2023-01-31 PROCEDURE — 83036 HEMOGLOBIN GLYCOSYLATED A1C: CPT

## 2023-01-31 PROCEDURE — 84443 ASSAY THYROID STIM HORMONE: CPT

## 2023-01-31 PROCEDURE — 80053 COMPREHEN METABOLIC PANEL: CPT

## 2023-01-31 PROCEDURE — 36415 COLL VENOUS BLD VENIPUNCTURE: CPT

## 2023-01-31 PROCEDURE — 80061 LIPID PANEL: CPT

## 2023-01-31 PROCEDURE — 85025 COMPLETE CBC W/AUTO DIFF WBC: CPT

## 2023-01-31 PROCEDURE — 82043 UR ALBUMIN QUANTITATIVE: CPT

## 2023-01-31 PROCEDURE — 81001 URINALYSIS AUTO W/SCOPE: CPT

## 2023-01-31 PROCEDURE — 84439 ASSAY OF FREE THYROXINE: CPT

## 2023-02-07 ENCOUNTER — OFFICE VISIT (OUTPATIENT)
Dept: INTERNAL MEDICINE | Facility: CLINIC | Age: 78
End: 2023-02-07
Payer: MEDICARE

## 2023-02-07 VITALS
SYSTOLIC BLOOD PRESSURE: 128 MMHG | HEART RATE: 70 BPM | HEIGHT: 61 IN | WEIGHT: 133 LBS | RESPIRATION RATE: 16 BRPM | TEMPERATURE: 98 F | DIASTOLIC BLOOD PRESSURE: 72 MMHG | OXYGEN SATURATION: 97 % | BODY MASS INDEX: 25.11 KG/M2

## 2023-02-07 DIAGNOSIS — Z72.0 TOBACCO USER: ICD-10-CM

## 2023-02-07 DIAGNOSIS — I10 ESSENTIAL HYPERTENSION: ICD-10-CM

## 2023-02-07 DIAGNOSIS — G47.00 INSOMNIA, UNSPECIFIED TYPE: ICD-10-CM

## 2023-02-07 DIAGNOSIS — E11.9 TYPE 2 DIABETES MELLITUS WITHOUT COMPLICATION, WITHOUT LONG-TERM CURRENT USE OF INSULIN: ICD-10-CM

## 2023-02-07 DIAGNOSIS — E03.9 HYPOTHYROIDISM, UNSPECIFIED TYPE: ICD-10-CM

## 2023-02-07 DIAGNOSIS — R91.1 SOLITARY PULMONARY NODULE: ICD-10-CM

## 2023-02-07 DIAGNOSIS — R91.1 LUNG NODULE: ICD-10-CM

## 2023-02-07 DIAGNOSIS — L80 VITILIGO: Primary | ICD-10-CM

## 2023-02-07 DIAGNOSIS — Z12.2 ENCOUNTER FOR SCREENING FOR LUNG CANCER: ICD-10-CM

## 2023-02-07 DIAGNOSIS — Z00.00 WELLNESS EXAMINATION: ICD-10-CM

## 2023-02-07 DIAGNOSIS — E55.9 VITAMIN D DEFICIENCY: ICD-10-CM

## 2023-02-07 DIAGNOSIS — I73.9 PERIPHERAL VASCULAR DISEASE: ICD-10-CM

## 2023-02-07 DIAGNOSIS — Z87.891 HISTORY OF TOBACCO USE: Primary | ICD-10-CM

## 2023-02-07 DIAGNOSIS — E78.5 DYSLIPIDEMIA: ICD-10-CM

## 2023-02-07 DIAGNOSIS — C50.912 PRIMARY MALIGNANT NEOPLASM OF LEFT BREAST: ICD-10-CM

## 2023-02-07 PROCEDURE — 99214 PR OFFICE/OUTPT VISIT, EST, LEVL IV, 30-39 MIN: ICD-10-PCS | Mod: ,,, | Performed by: INTERNAL MEDICINE

## 2023-02-07 PROCEDURE — 99214 OFFICE O/P EST MOD 30 MIN: CPT | Mod: ,,, | Performed by: INTERNAL MEDICINE

## 2023-02-07 NOTE — PROGRESS NOTES
"Subjective:       Patient ID: Marlin Herrera is a 77 y.o. female.    Chief Complaint: Hypertension, Diabetes, Hypothyroidism, Gastroesophageal Reflux, Vitamin D Deficiency, Insomnia, Peripheral Vascular Disease, and smoker    77-year-old female seen today for followup of diabetes, hypertension, hypothyroidism, and hyperlipidemia among other conditions.     Review of Systems   Constitutional:  Negative for fever.   HENT:  Negative for nosebleeds.    Eyes:  Negative for visual disturbance.   Respiratory:  Negative for shortness of breath.    Cardiovascular:  Negative for chest pain.   Gastrointestinal:  Negative for abdominal pain.   Genitourinary:  Negative for dysuria.   Musculoskeletal:  Negative for gait problem.   Neurological:  Negative for headaches.       Objective:      Physical Exam  HENT:      Head: Normocephalic.      Mouth/Throat:      Pharynx: Oropharynx is clear.   Eyes:      Extraocular Movements: Extraocular movements intact.   Cardiovascular:      Rate and Rhythm: Normal rate and regular rhythm.   Pulmonary:      Breath sounds: Normal breath sounds.   Abdominal:      Palpations: Abdomen is soft.   Musculoskeletal:         General: No swelling.   Skin:     General: Skin is warm.   Neurological:      General: No focal deficit present.      Mental Status: She is alert and oriented to person, place, and time.   Psychiatric:         Mood and Affect: Mood normal.       Vitals:    02/07/23 1252   BP: 128/72   Pulse: 70   Resp: 16   Temp: 98.4 °F (36.9 °C)   SpO2: 97%   Weight: 60.3 kg (133 lb)   Height: 5' 1" (1.549 m)        Assessment:       Problem List Items Addressed This Visit          Derm    Vitiligo - Primary    Relevant Orders    CBC Auto Differential    Comprehensive Metabolic Panel    Lipid Panel    Microalbumin/Creatinine Ratio, Urine    Urinalysis, Reflex to Urine Culture    TSH    Hemoglobin A1C       Cardiac/Vascular    Essential hypertension    Relevant Orders    CBC Auto Differential    " Comprehensive Metabolic Panel    Lipid Panel    Microalbumin/Creatinine Ratio, Urine    Urinalysis, Reflex to Urine Culture    TSH    Hemoglobin A1C    Peripheral vascular disease    Relevant Orders    CBC Auto Differential    Comprehensive Metabolic Panel    Lipid Panel    Microalbumin/Creatinine Ratio, Urine    Urinalysis, Reflex to Urine Culture    TSH    Hemoglobin A1C    Dyslipidemia    Relevant Orders    CBC Auto Differential    Comprehensive Metabolic Panel    Lipid Panel    Microalbumin/Creatinine Ratio, Urine    Urinalysis, Reflex to Urine Culture    TSH    Hemoglobin A1C       Oncology    Primary malignant neoplasm of breast    Relevant Orders    CBC Auto Differential    Comprehensive Metabolic Panel    Lipid Panel    Microalbumin/Creatinine Ratio, Urine    Urinalysis, Reflex to Urine Culture    TSH    Hemoglobin A1C       Endocrine    Diabetes mellitus    Relevant Orders    CBC Auto Differential    Comprehensive Metabolic Panel    Lipid Panel    Microalbumin/Creatinine Ratio, Urine    Urinalysis, Reflex to Urine Culture    TSH    Hemoglobin A1C    Vitamin D deficiency    Relevant Orders    CBC Auto Differential    Comprehensive Metabolic Panel    Lipid Panel    Microalbumin/Creatinine Ratio, Urine    Urinalysis, Reflex to Urine Culture    TSH    Hemoglobin A1C    Hypothyroidism, unspecified    Relevant Orders    CBC Auto Differential    Comprehensive Metabolic Panel    Lipid Panel    Microalbumin/Creatinine Ratio, Urine    Urinalysis, Reflex to Urine Culture    TSH    Hemoglobin A1C       Other    Insomnia    Relevant Orders    CBC Auto Differential    Comprehensive Metabolic Panel    Lipid Panel    Microalbumin/Creatinine Ratio, Urine    Urinalysis, Reflex to Urine Culture    TSH    Hemoglobin A1C    RESOLVED: Wellness examination    Relevant Orders    CBC Auto Differential    Comprehensive Metabolic Panel    Lipid Panel    Microalbumin/Creatinine Ratio, Urine    Urinalysis, Reflex to Urine Culture     TSH    Hemoglobin A1C       Medication List with Changes/Refills   Current Medications    AMITRIPTYLINE (ELAVIL) 25 MG TABLET    Take 1 tablet (25 mg total) by mouth every evening.    AMLODIPINE (NORVASC) 5 MG TABLET    TAKE 1 TABLET BY MOUTH TWICE A DAY    ASPIRIN (ECOTRIN) 81 MG EC TABLET    aspirin 81 mg tablet,delayed release   Take 1 tablet every day by oral route.    BLOOD SUGAR DIAGNOSTIC STRP      Free style Lite Test strips, See Instructions, DX: E11.9 to test sugar daily, # 100 EA, 11 Refill(s)    CALCIUM CARBONATE (OS-YRN) 600 MG CALCIUM (1,500 MG) TAB    Calcium 600 mg calcium (1,500 mg) tablet   Take 1 tablet every day by oral route.    ESCITALOPRAM OXALATE (LEXAPRO) 20 MG TABLET    Take 1 tablet (20 mg total) by mouth once daily.    ESOMEPRAZOLE (NEXIUM) 40 MG CAPSULE    Take 1 capsule (40 mg total) by mouth once daily.    LETROZOLE (FEMARA) 2.5 MG TAB    TAKE 1 TABLET BY MOUTH ONCE A DAY    LEVOTHYROXINE (SYNTHROID) 88 MCG TABLET    TAKE 1 TABLET BY MOUTH EVERY DAY    LOSARTAN-HYDROCHLOROTHIAZIDE 100-12.5 MG (HYZAAR) 100-12.5 MG TAB    TAKE 1 TABLET BY MOUTH EVERY DAY    MELOXICAM (MOBIC) 7.5 MG TABLET    Take 7.5 mg by mouth once daily.    METOPROLOL SUCCINATE (TOPROL-XL) 100 MG 24 HR TABLET    TAKE 1 TABLET BY MOUTH EVERY DAY    OMEGA 3-DHA-EPA-FISH OIL 1,000 MG (120 MG-180 MG) CAP    Fish Oil 1,000 mg (120 mg-180 mg) capsule   Take 1 capsule every day by oral route.    ROSUVASTATIN (CRESTOR) 10 MG TABLET    TAKE 1 TABLET BY MOUTH EVERY DAY IN EVENING    TIZANIDINE (ZANAFLEX) 4 MG TABLET    tizanidine 4 mg tablet        Plan:       1. Diabetes: Hemoglobin A1c is 6.4. Metformin was stopped because of diarrhea or dyspepsia. She is no longer on medication, yet her glucose is very well controlled    2. Hypertension: Stable    3. Hyperlipidemia: LDL at goal. Calcium score 1.3 in 2018    4. Cervical spinal stenosis: She had surgery in 2013. Continue meloxicam. She saw pain management once (Dr. Smallwood  assistant) in  and they suggested Toradol injection PRN.    5. Murguia's esophagus: EGD in 2021 with Dr. Felton    6. Gastroesophageal reflux disease: Restart Nexium    7. Hypothyroidism: TSH normal    8. Insomnia: She has worsening insomnia. Decreased amitriptyline back to 25mg because of drowsiness     9. Vitamin D deficiency: Continue vitamin D    10. Osteoarthritis: L knee pain worsening. Referred to Ortho previously, considering knee replacement    11. Urinary tract infection: Treated with Cipro at last visit    12. Tobacco use: She smokes half a pack per day. Lungs screening CT negative in 3/2022, repeat in . Stage IA left breast cancer: Diagnosed in 2019 status post mastectomy. Continue Femara. Imaging 2022    14. Peripheral vascular disease: CT angiogram of the lower extremities showed diffuse disease. She has claudication and pain in her lower back and right leg. She will F/U with Dr. Steinberg. Continue aspirin 81mg daily    15. Vitiligo: Her brother has vitiligo as well    16. Wellness: Colonoscopy normal in 2020. Pneumovax 2021. MMG/ultrasound 2022      Her partner, Mr. Madison, had metastatic lung cancer and  in 2019

## 2023-03-28 DIAGNOSIS — C50.912 PRIMARY MALIGNANT NEOPLASM OF LEFT BREAST: ICD-10-CM

## 2023-03-28 RX ORDER — LETROZOLE 2.5 MG/1
2.5 TABLET, FILM COATED ORAL DAILY
Qty: 30 TABLET | Refills: 3 | Status: SHIPPED | OUTPATIENT
Start: 2023-03-28 | End: 2023-08-15

## 2023-05-24 DIAGNOSIS — Z00.00 WELLNESS EXAMINATION: Primary | ICD-10-CM

## 2023-05-24 RX ORDER — MELOXICAM 7.5 MG/1
TABLET ORAL
Qty: 90 TABLET | Refills: 3 | Status: SHIPPED | OUTPATIENT
Start: 2023-05-24

## 2023-06-29 ENCOUNTER — LAB VISIT (OUTPATIENT)
Dept: LAB | Facility: HOSPITAL | Age: 78
End: 2023-06-29
Attending: COLON & RECTAL SURGERY
Payer: MEDICARE

## 2023-06-29 DIAGNOSIS — M21.612 BUNION OF GREAT TOE OF LEFT FOOT: Primary | ICD-10-CM

## 2023-06-29 LAB
ALBUMIN SERPL-MCNC: 4.2 G/DL (ref 3.4–4.8)
ALBUMIN/GLOB SERPL: 1.5 RATIO (ref 1.1–2)
ALP SERPL-CCNC: 67 UNIT/L (ref 40–150)
ALT SERPL-CCNC: 18 UNIT/L (ref 0–55)
AST SERPL-CCNC: 27 UNIT/L (ref 5–34)
BASOPHILS # BLD AUTO: 0.02 X10(3)/MCL
BASOPHILS NFR BLD AUTO: 0.4 %
BILIRUBIN DIRECT+TOT PNL SERPL-MCNC: 0.7 MG/DL
BUN SERPL-MCNC: 16.9 MG/DL (ref 9.8–20.1)
CALCIUM SERPL-MCNC: 9.7 MG/DL (ref 8.4–10.2)
CHLORIDE SERPL-SCNC: 102 MMOL/L (ref 98–107)
CO2 SERPL-SCNC: 31 MMOL/L (ref 23–31)
CREAT SERPL-MCNC: 0.69 MG/DL (ref 0.55–1.02)
EOSINOPHIL # BLD AUTO: 0.15 X10(3)/MCL (ref 0–0.9)
EOSINOPHIL NFR BLD AUTO: 2.7 %
ERYTHROCYTE [DISTWIDTH] IN BLOOD BY AUTOMATED COUNT: 12.3 % (ref 11.5–17)
EST. AVERAGE GLUCOSE BLD GHB EST-MCNC: 134.1 MG/DL
GFR SERPLBLD CREATININE-BSD FMLA CKD-EPI: >60 MLS/MIN/1.73/M2
GLOBULIN SER-MCNC: 2.8 GM/DL (ref 2.4–3.5)
GLUCOSE SERPL-MCNC: 125 MG/DL (ref 82–115)
HBA1C MFR BLD: 6.3 %
HCT VFR BLD AUTO: 43 % (ref 37–47)
HGB BLD-MCNC: 14.1 G/DL (ref 12–16)
IMM GRANULOCYTES # BLD AUTO: 0.01 X10(3)/MCL (ref 0–0.04)
IMM GRANULOCYTES NFR BLD AUTO: 0.2 %
LYMPHOCYTES # BLD AUTO: 1.65 X10(3)/MCL (ref 0.6–4.6)
LYMPHOCYTES NFR BLD AUTO: 29.2 %
MCH RBC QN AUTO: 32.4 PG (ref 27–31)
MCHC RBC AUTO-ENTMCNC: 32.8 G/DL (ref 33–36)
MCV RBC AUTO: 98.9 FL (ref 80–94)
MONOCYTES # BLD AUTO: 0.64 X10(3)/MCL (ref 0.1–1.3)
MONOCYTES NFR BLD AUTO: 11.3 %
NEUTROPHILS # BLD AUTO: 3.18 X10(3)/MCL (ref 2.1–9.2)
NEUTROPHILS NFR BLD AUTO: 56.2 %
NRBC BLD AUTO-RTO: 0 %
PLATELET # BLD AUTO: 145 X10(3)/MCL (ref 130–400)
PMV BLD AUTO: 9.7 FL (ref 7.4–10.4)
POTASSIUM SERPL-SCNC: 4.8 MMOL/L (ref 3.5–5.1)
PROT SERPL-MCNC: 7 GM/DL (ref 5.8–7.6)
RBC # BLD AUTO: 4.35 X10(6)/MCL (ref 4.2–5.4)
SODIUM SERPL-SCNC: 140 MMOL/L (ref 136–145)
WBC # SPEC AUTO: 5.65 X10(3)/MCL (ref 4.5–11.5)

## 2023-06-29 PROCEDURE — 85025 COMPLETE CBC W/AUTO DIFF WBC: CPT

## 2023-06-29 PROCEDURE — 83036 HEMOGLOBIN GLYCOSYLATED A1C: CPT

## 2023-06-29 PROCEDURE — 80053 COMPREHEN METABOLIC PANEL: CPT

## 2023-06-29 PROCEDURE — 36415 COLL VENOUS BLD VENIPUNCTURE: CPT

## 2023-07-27 ENCOUNTER — HOSPITAL ENCOUNTER (OUTPATIENT)
Dept: RADIOLOGY | Facility: HOSPITAL | Age: 78
Discharge: HOME | End: 2023-07-27
Attending: NURSE PRACTITIONER
Payer: MEDICARE

## 2023-07-27 DIAGNOSIS — Z12.31 ENCOUNTER FOR SCREENING MAMMOGRAM FOR BREAST CANCER: ICD-10-CM

## 2023-07-27 PROCEDURE — 77063 BREAST TOMOSYNTHESIS BI: CPT | Mod: 26,52,, | Performed by: RADIOLOGY

## 2023-07-27 PROCEDURE — 77067 SCR MAMMO BI INCL CAD: CPT | Mod: TC,52

## 2023-07-27 PROCEDURE — 77067 MAMMO DIGITAL SCREENING RIGHT WITH TOMO: ICD-10-PCS | Mod: 26,52,, | Performed by: RADIOLOGY

## 2023-07-27 PROCEDURE — 77067 SCR MAMMO BI INCL CAD: CPT | Mod: 26,52,, | Performed by: RADIOLOGY

## 2023-07-27 PROCEDURE — 77063 MAMMO DIGITAL SCREENING RIGHT WITH TOMO: ICD-10-PCS | Mod: 26,52,, | Performed by: RADIOLOGY

## 2023-08-07 ENCOUNTER — LAB VISIT (OUTPATIENT)
Dept: LAB | Facility: HOSPITAL | Age: 78
End: 2023-08-07
Attending: INTERNAL MEDICINE
Payer: MEDICARE

## 2023-08-07 DIAGNOSIS — L80 VITILIGO: ICD-10-CM

## 2023-08-07 DIAGNOSIS — I10 ESSENTIAL HYPERTENSION: ICD-10-CM

## 2023-08-07 DIAGNOSIS — C50.912 PRIMARY MALIGNANT NEOPLASM OF LEFT BREAST: ICD-10-CM

## 2023-08-07 DIAGNOSIS — I73.9 PERIPHERAL VASCULAR DISEASE: ICD-10-CM

## 2023-08-07 DIAGNOSIS — E03.9 HYPOTHYROIDISM, UNSPECIFIED TYPE: ICD-10-CM

## 2023-08-07 DIAGNOSIS — G47.00 INSOMNIA, UNSPECIFIED TYPE: ICD-10-CM

## 2023-08-07 DIAGNOSIS — E11.9 TYPE 2 DIABETES MELLITUS WITHOUT COMPLICATION, WITHOUT LONG-TERM CURRENT USE OF INSULIN: ICD-10-CM

## 2023-08-07 DIAGNOSIS — E78.5 DYSLIPIDEMIA: ICD-10-CM

## 2023-08-07 DIAGNOSIS — Z00.00 WELLNESS EXAMINATION: ICD-10-CM

## 2023-08-07 DIAGNOSIS — E55.9 VITAMIN D DEFICIENCY: ICD-10-CM

## 2023-08-07 LAB
ALBUMIN SERPL-MCNC: 4.2 G/DL (ref 3.4–4.8)
ALBUMIN/GLOB SERPL: 1.6 RATIO (ref 1.1–2)
ALP SERPL-CCNC: 64 UNIT/L (ref 40–150)
ALT SERPL-CCNC: 20 UNIT/L (ref 0–55)
APPEARANCE UR: CLEAR
AST SERPL-CCNC: 28 UNIT/L (ref 5–34)
BACTERIA #/AREA URNS AUTO: NORMAL /HPF
BASOPHILS # BLD AUTO: 0.04 X10(3)/MCL
BASOPHILS NFR BLD AUTO: 0.6 %
BILIRUB UR QL STRIP.AUTO: NEGATIVE
BILIRUBIN DIRECT+TOT PNL SERPL-MCNC: 0.6 MG/DL
BUN SERPL-MCNC: 15.3 MG/DL (ref 9.8–20.1)
CALCIUM SERPL-MCNC: 9.6 MG/DL (ref 8.4–10.2)
CHLORIDE SERPL-SCNC: 103 MMOL/L (ref 98–107)
CHOLEST SERPL-MCNC: 178 MG/DL
CHOLEST/HDLC SERPL: 5 {RATIO} (ref 0–5)
CO2 SERPL-SCNC: 31 MMOL/L (ref 23–31)
COLOR UR: YELLOW
CREAT SERPL-MCNC: 0.75 MG/DL (ref 0.55–1.02)
CREAT UR-MCNC: 104.9 MG/DL (ref 45–106)
EOSINOPHIL # BLD AUTO: 0.21 X10(3)/MCL (ref 0–0.9)
EOSINOPHIL NFR BLD AUTO: 3.3 %
ERYTHROCYTE [DISTWIDTH] IN BLOOD BY AUTOMATED COUNT: 12.3 % (ref 11.5–17)
EST. AVERAGE GLUCOSE BLD GHB EST-MCNC: 139.9 MG/DL
GFR SERPLBLD CREATININE-BSD FMLA CKD-EPI: >60 MLS/MIN/1.73/M2
GLOBULIN SER-MCNC: 2.6 GM/DL (ref 2.4–3.5)
GLUCOSE SERPL-MCNC: 119 MG/DL (ref 82–115)
GLUCOSE UR QL STRIP.AUTO: NEGATIVE
HBA1C MFR BLD: 6.5 %
HCT VFR BLD AUTO: 42.5 % (ref 37–47)
HDLC SERPL-MCNC: 36 MG/DL (ref 35–60)
HGB BLD-MCNC: 14 G/DL (ref 12–16)
IMM GRANULOCYTES # BLD AUTO: 0.02 X10(3)/MCL (ref 0–0.04)
IMM GRANULOCYTES NFR BLD AUTO: 0.3 %
KETONES UR QL STRIP.AUTO: NEGATIVE
LDLC SERPL CALC-MCNC: 90 MG/DL (ref 50–140)
LEUKOCYTE ESTERASE UR QL STRIP.AUTO: ABNORMAL
LYMPHOCYTES # BLD AUTO: 1.75 X10(3)/MCL (ref 0.6–4.6)
LYMPHOCYTES NFR BLD AUTO: 27.8 %
MCH RBC QN AUTO: 32.3 PG (ref 27–31)
MCHC RBC AUTO-ENTMCNC: 32.9 G/DL (ref 33–36)
MCV RBC AUTO: 98.2 FL (ref 80–94)
MICROALBUMIN UR-MCNC: 57.8 UG/ML
MICROALBUMIN/CREAT RATIO PNL UR: 55.1 MG/GM CR (ref 0–30)
MONOCYTES # BLD AUTO: 0.67 X10(3)/MCL (ref 0.1–1.3)
MONOCYTES NFR BLD AUTO: 10.7 %
NEUTROPHILS # BLD AUTO: 3.6 X10(3)/MCL (ref 2.1–9.2)
NEUTROPHILS NFR BLD AUTO: 57.3 %
NITRITE UR QL STRIP.AUTO: NEGATIVE
NRBC BLD AUTO-RTO: 0 %
PH UR STRIP.AUTO: 6.5 [PH]
PLATELET # BLD AUTO: 168 X10(3)/MCL (ref 130–400)
PMV BLD AUTO: 10 FL (ref 7.4–10.4)
POTASSIUM SERPL-SCNC: 4.4 MMOL/L (ref 3.5–5.1)
PROT SERPL-MCNC: 6.8 GM/DL (ref 5.8–7.6)
PROT UR QL STRIP.AUTO: NEGATIVE
RBC # BLD AUTO: 4.33 X10(6)/MCL (ref 4.2–5.4)
RBC #/AREA URNS AUTO: NORMAL /HPF
RBC UR QL AUTO: NEGATIVE
SODIUM SERPL-SCNC: 140 MMOL/L (ref 136–145)
SP GR UR STRIP.AUTO: 1.02 (ref 1–1.03)
SQUAMOUS #/AREA URNS AUTO: NORMAL /HPF
TRIGL SERPL-MCNC: 260 MG/DL (ref 37–140)
TSH SERPL-ACNC: 0.82 UIU/ML (ref 0.35–4.94)
UROBILINOGEN UR STRIP-ACNC: 1
VLDLC SERPL CALC-MCNC: 52 MG/DL
WBC # SPEC AUTO: 6.29 X10(3)/MCL (ref 4.5–11.5)
WBC #/AREA URNS AUTO: NORMAL /HPF

## 2023-08-07 PROCEDURE — 36415 COLL VENOUS BLD VENIPUNCTURE: CPT

## 2023-08-07 PROCEDURE — 85025 COMPLETE CBC W/AUTO DIFF WBC: CPT

## 2023-08-07 PROCEDURE — 80053 COMPREHEN METABOLIC PANEL: CPT

## 2023-08-07 PROCEDURE — 81001 URINALYSIS AUTO W/SCOPE: CPT

## 2023-08-07 PROCEDURE — 82043 UR ALBUMIN QUANTITATIVE: CPT

## 2023-08-07 PROCEDURE — 80061 LIPID PANEL: CPT

## 2023-08-07 PROCEDURE — 84443 ASSAY THYROID STIM HORMONE: CPT

## 2023-08-07 PROCEDURE — 83036 HEMOGLOBIN GLYCOSYLATED A1C: CPT

## 2023-08-10 ENCOUNTER — OFFICE VISIT (OUTPATIENT)
Dept: INTERNAL MEDICINE | Facility: CLINIC | Age: 78
End: 2023-08-10
Payer: MEDICARE

## 2023-08-10 VITALS
HEART RATE: 64 BPM | DIASTOLIC BLOOD PRESSURE: 62 MMHG | SYSTOLIC BLOOD PRESSURE: 126 MMHG | OXYGEN SATURATION: 98 % | BODY MASS INDEX: 25.11 KG/M2 | RESPIRATION RATE: 16 BRPM | TEMPERATURE: 98 F | WEIGHT: 133 LBS | HEIGHT: 61 IN

## 2023-08-10 DIAGNOSIS — I70.209 ATHEROSCLEROSIS OF ARTERIES OF EXTREMITIES: ICD-10-CM

## 2023-08-10 DIAGNOSIS — E55.9 VITAMIN D DEFICIENCY: ICD-10-CM

## 2023-08-10 DIAGNOSIS — I73.9 PERIPHERAL VASCULAR DISEASE: ICD-10-CM

## 2023-08-10 DIAGNOSIS — E03.9 HYPOTHYROIDISM, UNSPECIFIED TYPE: ICD-10-CM

## 2023-08-10 DIAGNOSIS — J43.9 PULMONARY EMPHYSEMA, UNSPECIFIED EMPHYSEMA TYPE: ICD-10-CM

## 2023-08-10 DIAGNOSIS — Z00.00 WELLNESS EXAMINATION: Primary | ICD-10-CM

## 2023-08-10 DIAGNOSIS — E11.40 TYPE 2 DIABETES MELLITUS WITH DIABETIC NEUROPATHY, WITHOUT LONG-TERM CURRENT USE OF INSULIN: ICD-10-CM

## 2023-08-10 DIAGNOSIS — C50.912 PRIMARY MALIGNANT NEOPLASM OF LEFT BREAST: ICD-10-CM

## 2023-08-10 DIAGNOSIS — I10 ESSENTIAL HYPERTENSION: ICD-10-CM

## 2023-08-10 DIAGNOSIS — I83.90 VARICOSE VEINS OF LOWER EXTREMITY, UNSPECIFIED LATERALITY, UNSPECIFIED WHETHER COMPLICATED: ICD-10-CM

## 2023-08-10 DIAGNOSIS — E11.9 TYPE 2 DIABETES MELLITUS WITHOUT COMPLICATION, WITHOUT LONG-TERM CURRENT USE OF INSULIN: ICD-10-CM

## 2023-08-10 DIAGNOSIS — E78.5 DYSLIPIDEMIA: ICD-10-CM

## 2023-08-10 PROCEDURE — G0439 PPPS, SUBSEQ VISIT: HCPCS | Mod: ,,, | Performed by: INTERNAL MEDICINE

## 2023-08-10 PROCEDURE — G0439 PR MEDICARE ANNUAL WELLNESS SUBSEQUENT VISIT: ICD-10-PCS | Mod: ,,, | Performed by: INTERNAL MEDICINE

## 2023-08-10 RX ORDER — ICOSAPENT ETHYL 1000 MG/1
2 CAPSULE ORAL 2 TIMES DAILY
COMMUNITY
Start: 2023-08-04 | End: 2024-02-15

## 2023-08-10 NOTE — PROGRESS NOTES
Subjective:       Patient ID: Marlin Herrera is a 78 y.o. female.      Patient Care Team:  Mark Lawton II, MD as PCP - General (Internal Medicine)  Patricia Cohen PA as Physician Assistant (Physician Assistant)    Chief Complaint: Medicare AWV Follow Up, Peripheral Vascular Disease, Diabetes, Hypertension, Gastroesophageal Reflux, Hypothyroidism, Insomnia, Hyperlipidemia, Coronary Artery Disease, and Emphysema    78-year-old female seen today for followup of diabetes, hypertension, hypothyroidism, and hyperlipidemia among other conditions.       Review of Systems   Constitutional:  Negative for fever.   HENT:  Negative for nosebleeds.    Eyes:  Negative for visual disturbance.   Respiratory:  Negative for shortness of breath.    Cardiovascular:  Negative for chest pain.   Gastrointestinal:  Negative for abdominal pain.   Genitourinary:  Negative for dysuria.   Musculoskeletal:  Negative for gait problem.   Neurological:  Negative for headaches.           Patient Reported Health Risk Assessment  What is your age?: 80 or older  Are you male or female?: Female  During the past four weeks, how much have you been bothered by emotional problems such as feeling anxious, depressed, irritable, sad, or downhearted and blue?: Not at all  During the past five weeks, has your physical and/or emotional health limited your social activities with family, friends, neighbors, or groups?: Not at all  During the past four weeks, how much bodily pain have you generally had?: Moderate pain  During the past four weeks, was someone available to help if you needed and wanted help?: Yes, as much as I wanted  During the past four weeks, what was the hardest physical activity you could do for at least two minutes?: Very light  Can you get to places out of walking distance without help?  (For example, can you travel alone on buses or taxis, or drive your own car?): Yes  Can you go shopping for groceries or clothes without  someone's help?: Yes  Can you prepare your own meals?: Yes  Can you do your own housework without help?: Yes  Because of any health problems, do you need the help of another person with your personal care needs such as eating, bathing, dressing, or getting around the house?: No  Can you handle your own money without help?: Yes  During the past four weeks, how would you rate your health in general?: Good  How have things been going for you during the past four weeks?: Very well  Are you having difficulties driving your car?: No  Do you always fasten your seat belt when you are in a car?: Yes, usually  How often in the past four weeks have you been bothered by falling or dizzy when standing up?: Never  How often in the past four weeks have you been bothered by sexual problems?: Never  How often in the past four weeks have you been bothered by trouble eating well?: Never  How often in the past four weeks have you been bothered by teeth or denture problems?: Never  How often in the past four weeks have you been bothered with problems using the telephone?: Never  How often in the past four weeks have you been bothered by tiredness or fatigue?: Sometimes  Have you fallen two or more times in the past year?: No  Are you afraid of falling?: No  Are you a smoker?: Yes, I'm not ready to quit  During the past four weeks, how many drinks of wine, beer, or other alcoholic beverages did you have?: 2-5 drinks per weeks  Do you exercise for about 20 minutes three or more days a week?: Yes, some of the time  Have you been given any information to help you with hazards in your house that might hurt you?: No  Have you been given any information to help you with keeping track of your medications?: No  How often do you have trouble taking medicines the way you've been told to take them?: I always take them as prescribed  How confident are you that you can control and manage most of your health problems?: Somewhat confident  What is your  "race? (Check all that apply.):       Objective:      Physical Exam  HENT:      Head: Normocephalic.      Mouth/Throat:      Pharynx: Oropharynx is clear.   Eyes:      Extraocular Movements: Extraocular movements intact.   Cardiovascular:      Rate and Rhythm: Normal rate.   Pulmonary:      Breath sounds: Normal breath sounds.   Abdominal:      Palpations: Abdomen is soft.   Musculoskeletal:         General: No swelling.   Skin:     General: Skin is warm.   Neurological:      General: No focal deficit present.      Mental Status: She is alert and oriented to person, place, and time.   Psychiatric:         Mood and Affect: Mood normal.         Vitals:    08/10/23 1335   BP: 126/62   Pulse: 64   Resp: 16   Temp: 98.2 °F (36.8 °C)   SpO2: 98%   Weight: 60.3 kg (133 lb)   Height: 5' 1" (1.549 m)            No flowsheet data found.  Fall Risk Assessment - Outpatient 8/10/2023 2/7/2023 1/26/2023 8/2/2022   Mobility Status Ambulatory Ambulatory Ambulatory Ambulatory   Number of falls 0 0 0 0   Identified as fall risk 0 0 0 0           Depression Screening  Over the past two weeks, has the patient felt down, depressed, or hopeless?: No  Over the past two weeks, has the patient felt little interest or pleasure in doing things?: No  Functional Ability/Safety Screening  Was the patient's timed Up & Go test unsteady or longer than 30 seconds?: No  Does the patient need help with phone, transportation, shopping, preparing meals, housework, laundry, meds, or managing money?: No  Does the patient's home have rugs in the hallway, lack grab bars in the bathroom, lack handrails on the stairs or have poor lighting?: No  Have you noticed any hearing difficulties?: No  Cognitive Function (Assessed through direct observation with due consideration of information obtained by way of patient reports and/or concerns raised by family, friends, caretakers, or others)    Does the patient repeat questions/statements in the same day?: " No  Does the patient have trouble remembering the date, year, and time?: No  Does the patient have difficulty managing finances?: No  Does the patient have a decreased sense of direction?: No      Assessment:       Problem List Items Addressed This Visit          Pulmonary    Pulmonary emphysema, unspecified emphysema type    Relevant Orders    CBC Auto Differential    Comprehensive Metabolic Panel    Lipid Panel    Microalbumin/Creatinine Ratio, Urine    Urinalysis, Reflex to Urine Culture    T4, Free    TSH    Hemoglobin A1C       Cardiac/Vascular    Atherosclerosis of arteries of extremities    Relevant Orders    CBC Auto Differential    Comprehensive Metabolic Panel    Lipid Panel    Microalbumin/Creatinine Ratio, Urine    Urinalysis, Reflex to Urine Culture    T4, Free    TSH    Hemoglobin A1C    Essential hypertension    Relevant Orders    CBC Auto Differential    Comprehensive Metabolic Panel    Lipid Panel    Microalbumin/Creatinine Ratio, Urine    Urinalysis, Reflex to Urine Culture    T4, Free    TSH    Hemoglobin A1C    Peripheral vascular disease    Relevant Orders    CBC Auto Differential    Comprehensive Metabolic Panel    Lipid Panel    Microalbumin/Creatinine Ratio, Urine    Urinalysis, Reflex to Urine Culture    T4, Free    TSH    Hemoglobin A1C    Varicose veins of lower extremity    Relevant Orders    CBC Auto Differential    Comprehensive Metabolic Panel    Lipid Panel    Microalbumin/Creatinine Ratio, Urine    Urinalysis, Reflex to Urine Culture    T4, Free    TSH    Hemoglobin A1C    Dyslipidemia    Relevant Orders    CBC Auto Differential    Comprehensive Metabolic Panel    Lipid Panel    Microalbumin/Creatinine Ratio, Urine    Urinalysis, Reflex to Urine Culture    T4, Free    TSH    Hemoglobin A1C       Oncology    Primary malignant neoplasm of breast    Relevant Orders    CBC Auto Differential    Comprehensive Metabolic Panel    Lipid Panel    Microalbumin/Creatinine Ratio, Urine     Urinalysis, Reflex to Urine Culture    T4, Free    TSH    Hemoglobin A1C       Endocrine    Type 2 diabetes mellitus with diabetic neuropathy, without long-term current use of insulin    Relevant Orders    CBC Auto Differential    Comprehensive Metabolic Panel    Lipid Panel    Microalbumin/Creatinine Ratio, Urine    Urinalysis, Reflex to Urine Culture    T4, Free    TSH    Hemoglobin A1C    Vitamin D deficiency    Relevant Orders    CBC Auto Differential    Comprehensive Metabolic Panel    Lipid Panel    Microalbumin/Creatinine Ratio, Urine    Urinalysis, Reflex to Urine Culture    T4, Free    TSH    Hemoglobin A1C    Hypothyroidism, unspecified    Relevant Orders    CBC Auto Differential    Comprehensive Metabolic Panel    Lipid Panel    Microalbumin/Creatinine Ratio, Urine    Urinalysis, Reflex to Urine Culture    T4, Free    TSH    Hemoglobin A1C       Other    RESOLVED: Wellness examination - Primary    Relevant Orders    CBC Auto Differential    Comprehensive Metabolic Panel    Lipid Panel    Microalbumin/Creatinine Ratio, Urine    Urinalysis, Reflex to Urine Culture    T4, Free    TSH    Hemoglobin A1C       Medication List with Changes/Refills   Current Medications    AMITRIPTYLINE (ELAVIL) 25 MG TABLET    Take 1 tablet (25 mg total) by mouth every evening.    AMLODIPINE (NORVASC) 5 MG TABLET    TAKE 1 TABLET BY MOUTH TWICE A DAY    ASPIRIN (ECOTRIN) 81 MG EC TABLET    aspirin 81 mg tablet,delayed release   Take 1 tablet every day by oral route.    BLOOD SUGAR DIAGNOSTIC STRP      Free style Lite Test strips, See Instructions, DX: E11.9 to test sugar daily, # 100 EA, 11 Refill(s)    CALCIUM CARBONATE (OS-YRN) 600 MG CALCIUM (1,500 MG) TAB    Calcium 600 mg calcium (1,500 mg) tablet   Take 1 tablet every day by oral route.    ESCITALOPRAM OXALATE (LEXAPRO) 20 MG TABLET    Take 1 tablet (20 mg total) by mouth once daily.    ESOMEPRAZOLE (NEXIUM) 40 MG CAPSULE    Take 1 capsule (40 mg total) by mouth once  daily.    LETROZOLE (FEMARA) 2.5 MG TAB    Take 1 tablet (2.5 mg total) by mouth once daily.    LEVOTHYROXINE (SYNTHROID) 88 MCG TABLET    TAKE 1 TABLET BY MOUTH EVERY DAY    LOSARTAN-HYDROCHLOROTHIAZIDE 100-12.5 MG (HYZAAR) 100-12.5 MG TAB    TAKE 1 TABLET BY MOUTH EVERY DAY    MELOXICAM (MOBIC) 7.5 MG TABLET    TAKE 1 TABLET BY MOUTH EVERY DAY    METOPROLOL SUCCINATE (TOPROL-XL) 100 MG 24 HR TABLET    TAKE 1 TABLET BY MOUTH EVERY DAY    OMEGA 3-DHA-EPA-FISH OIL 1,000 MG (120 MG-180 MG) CAP    Fish Oil 1,000 mg (120 mg-180 mg) capsule   Take 1 capsule every day by oral route.    ROSUVASTATIN (CRESTOR) 10 MG TABLET    TAKE 1 TABLET BY MOUTH EVERY DAY IN EVENING    TIZANIDINE (ZANAFLEX) 4 MG TABLET    tizanidine 4 mg tablet    VASCEPA 1 GRAM CAP    Take 2 capsules by mouth 2 (two) times a day.        Plan:       1. Diabetes: Hemoglobin A1c is 6.5. Metformin was stopped because of diarrhea or dyspepsia. She is no longer on medication, yet her glucose is very well controlled     2. Hypertension: Stable     3. Hyperlipidemia: LDL at goal. Calcium score 1.3 in 2018     4. Cervical spinal stenosis: She had surgery in 2013. Continue meloxicam. She saw pain management once (Dr. Smallwood assistant) in 2022 and they suggested Toradol injection PRN.     5. Murguia's esophagus: EGD in 5/2021 with Dr. Felton     6. Gastroesophageal reflux disease: Restart Nexium     7. Hypothyroidism: TSH normal     8. Insomnia: She has worsening insomnia. Decreased amitriptyline back to 25mg because of drowsiness      9. Vitamin D deficiency: Continue vitamin D     10. Osteoarthritis: L knee pain worsening. Referred to Ortho previously, considering knee replacement     11. Urinary tract infection: Treated with Cipro in 2022     12. Tobacco use: She smokes half a pack per day. Lungs screening CT negative in 3/2023     13. Stage IA left breast cancer: Diagnosed in 2019 status post mastectomy. Continue Femara. Imaging 7/2023     14. Peripheral  vascular disease: CT angiogram of the lower extremities showed diffuse disease. She has claudication and pain in her lower back and right leg. She will F/U with Dr. Steinberg and Dr. Valentino. Continue aspirin 81mg daily     15. Vitiligo: Her brother has vitiligo as well     16. Wellness: Colonoscopy normal in 2020. Pneumovax 2021. MMG/ultrasound 2023     Lungs are clear on imaging.  Likely an element of emphysema. Possible Pulm HTN, echo pending     Her partner, Mr. Madison, had metastatic lung cancer and  in       Medicare Annual Wellness and Personalized Prevention Plan:   Fall Risk + Home Safety + Hearing Impairment + Depression Screen + Cognitive Impairment Screen + Health Risk Assessment all reviewed    Health Maintenance Topics with due status: Not Due       Topic Last Completion Date    DEXA Scan 2021    Influenza Vaccine 2022    Diabetes Urine Screening 2023    Lipid Panel 2023    Hemoglobin A1c 2023      The patient's Health Maintenance was reviewed and the following appears to be due at this time:   Health Maintenance Due   Topic Date Due    TETANUS VACCINE  Never done    Pneumococcal Vaccines (Age 65+) (3 - PCV) 2022    COVID-19 Vaccine (6 - Moderna series) 2022    Eye Exam  2023       Advance Care Planning   I attest to discussing Advance Care Planning with patient and/or family member.  Education was provided including the importance of the Health Care Power of , Advance Directives, and/or [place] documentation.  The patient expressed understanding to the importance of this information and discussion.  Length of ACP conversation in minutes: 1       Opioid Screening: Patient medication list reviewed, patient is not taking prescription opioids. Patient is not using additional opioids than prescribed. Patient is at low risk of substance abuse based on this opioid use history.     No follow-ups on file. In addition to their scheduled  follow up, the patient has also been instructed to follow up on as needed basis.

## 2023-08-15 DIAGNOSIS — C50.912 PRIMARY MALIGNANT NEOPLASM OF LEFT BREAST: ICD-10-CM

## 2023-08-15 RX ORDER — LETROZOLE 2.5 MG/1
2.5 TABLET, FILM COATED ORAL
Qty: 30 TABLET | Refills: 0 | Status: SHIPPED | OUTPATIENT
Start: 2023-08-15 | End: 2023-08-16 | Stop reason: SDUPTHER

## 2023-08-16 DIAGNOSIS — C50.912 PRIMARY MALIGNANT NEOPLASM OF LEFT BREAST: ICD-10-CM

## 2023-08-16 RX ORDER — LETROZOLE 2.5 MG/1
2.5 TABLET, FILM COATED ORAL DAILY
Qty: 90 TABLET | Refills: 0 | Status: SHIPPED | OUTPATIENT
Start: 2023-08-16 | End: 2024-01-22

## 2023-08-23 DIAGNOSIS — Z00.00 WELLNESS EXAMINATION: Primary | ICD-10-CM

## 2023-08-23 RX ORDER — ESCITALOPRAM OXALATE 20 MG/1
20 TABLET ORAL
Qty: 90 TABLET | Refills: 3 | Status: SHIPPED | OUTPATIENT
Start: 2023-08-23

## 2023-08-23 RX ORDER — LEVOTHYROXINE SODIUM 88 UG/1
TABLET ORAL
Qty: 90 TABLET | Refills: 3 | Status: SHIPPED | OUTPATIENT
Start: 2023-08-23

## 2023-08-28 NOTE — PROGRESS NOTES
Subjective:       Patient ID: Marlin Herrera is a 78 y.o. female.    Surgeon: Dr. Emelina Leonard    Left Breast Cancer Stage IA ([id number]) Diagnosed 5/22/19  Biopsy/pathology: Left breast 6:00 periareolar lesion biopsy done 5/22/19--invasive ductal carcinoma Grade 2, DCIS Grade 2, proliferative fibrocystic changes with multifocal atypical lobular hyperplasia, ER 99.58%, [place] 47.36%, Her2 Equivocal by IHC 2+, negative by FISH.  Surgery/pathology: Left breast mastectomy done 7/8/19--infiltrating ductal carcinoma, NOS type, Grade 2, unifocal measures 1cm in greatest dimension, associated foci of DCIS grade 2, 8mm in greatest extent, margins free, no vascular invasion, 5 sentinel and 1 axillary lymph node all negative (T1bN0).  Imaging:  Screening MMG 4/17/19--developing focal asymmetry with calcifications in left breast at 6:00, needs additional imaging BIRADS 0.  Left breast Reji diagnostic MMG/US done 5/14/19--Left breast 6:00 periareolar position mass measures 0.9X1.6X0.6cm with associated calcifications, suspicious BIRADS 4.  CT chest LD screening done 3/25/2021--Stable tiny left lower lobe nodule, few punctate nodules not clearly seen on prior, for example 3 mm right upper lobe nodule, right upper lobe 2 mm nodule anteriorly, benign, recommend repeat in 12 months.    DEXA:  9/26/19--AP Spine T= -0.2, Femur neck left 0.1, right 0.2, Femur total left 0.6, right 0.0 Normal bone density.  9/30/21--AP Spine T= -0.4, Femur neck left 0.1, right 0.4, Femur total left 0.2, right -0.3, normal bone density, mixed findings.  8/31/23--AP spine -0.4, left femur neck 0.1, left total 0.1, right femur neck 0.2, right total -0.4, left forearm -1.4.     Treatment plan: Femara X 5-10 years started 8/15/19.       Chief Complaint: fatty liver concerns     HPI   Patient presents for follow-up of breast cancer. She continues on the Femara without any problems. No new complaints reported. She is still smoking but she has cut back. She does  have claudication and is being followed by vascular surgery. States she is concerned about the LDCT revealing severe fatty liver. She does drink more than 2 glasses of wine each night. No other problems reported.     Past Medical History:   Diagnosis Date    Hypothyroidism, unspecified       Review of patient's allergies indicates:   Allergen Reactions    Ace inhibitors Swelling    Metformin Diarrhea    Phenazopyridine     Nitrofurantoin monohyd/m-cryst Rash        Current Outpatient Medications:     amitriptyline (ELAVIL) 25 MG tablet, Take 1 tablet (25 mg total) by mouth every evening., Disp: 90 tablet, Rfl: 3    amLODIPine (NORVASC) 5 MG tablet, TAKE 1 TABLET BY MOUTH TWICE A DAY, Disp: 180 tablet, Rfl: 5    aspirin (ECOTRIN) 81 MG EC tablet, aspirin 81 mg tablet,delayed release  Take 1 tablet every day by oral route., Disp: , Rfl:     blood sugar diagnostic Strp,  Free style Lite Test strips, See Instructions, DX: E11.9 to test sugar daily, # 100 EA, 11 Refill(s), Disp: , Rfl:     calcium carbonate (OS-YRN) 600 mg calcium (1,500 mg) Tab, Calcium 600 mg calcium (1,500 mg) tablet  Take 1 tablet every day by oral route., Disp: , Rfl:     EScitalopram oxalate (LEXAPRO) 20 MG tablet, TAKE 1 TABLET BY MOUTH EVERY DAY, Disp: 90 tablet, Rfl: 3    esomeprazole (NEXIUM) 40 MG capsule, Take 1 capsule (40 mg total) by mouth once daily., Disp: 90 capsule, Rfl: 3    letrozole (FEMARA) 2.5 mg Tab, Take 1 tablet (2.5 mg total) by mouth once daily., Disp: 90 tablet, Rfl: 0    levothyroxine (SYNTHROID) 88 MCG tablet, TAKE 1 TABLET BY MOUTH EVERY DAY, Disp: 90 tablet, Rfl: 3    losartan-hydrochlorothiazide 100-12.5 mg (HYZAAR) 100-12.5 mg Tab, TAKE 1 TABLET BY MOUTH EVERY DAY, Disp: 90 tablet, Rfl: 4    meloxicam (MOBIC) 7.5 MG tablet, TAKE 1 TABLET BY MOUTH EVERY DAY, Disp: 90 tablet, Rfl: 3    metoprolol succinate (TOPROL-XL) 100 MG 24 hr tablet, TAKE 1 TABLET BY MOUTH EVERY DAY, Disp: 90 tablet, Rfl: 3    omega 3-dha-epa-fish  oil 1,000 mg (120 mg-180 mg) Cap, Fish Oil 1,000 mg (120 mg-180 mg) capsule  Take 1 capsule every day by oral route., Disp: , Rfl:     rosuvastatin (CRESTOR) 10 MG tablet, TAKE 1 TABLET BY MOUTH EVERY DAY IN EVENING, Disp: 90 tablet, Rfl: 4    tiZANidine (ZANAFLEX) 4 MG tablet, tizanidine 4 mg tablet, Disp: , Rfl:     VASCEPA 1 gram Cap, Take 2 capsules by mouth 2 (two) times a day., Disp: , Rfl:     Review of Systems   Constitutional:  Negative for activity change, fever and unexpected weight change.   Eyes:  Negative for visual disturbance.   Respiratory:  Negative for cough and shortness of breath.    Cardiovascular:  Negative for chest pain.        PVD with claudication pain   Gastrointestinal:  Negative for abdominal pain, blood in stool, constipation, diarrhea, nausea and vomiting.   Genitourinary:  Negative for difficulty urinating.   Musculoskeletal:  Positive for neck pain and neck stiffness. Negative for back pain.   Integumentary:  Negative for rash.   Neurological:  Negative for dizziness, weakness and headaches.   Psychiatric/Behavioral:  Negative for behavioral problems and suicidal ideas.          Vitals:    09/07/23 1347   BP: (!) 141/52   Pulse: 75   Resp: 15   Temp: 98.3 °F (36.8 °C)     Physical Exam  Vitals reviewed.   Constitutional:       Appearance: Normal appearance. She is normal weight.   HENT:      Head: Normocephalic.   Eyes:      General: Lids are normal. Vision grossly intact.      Extraocular Movements: Extraocular movements intact.      Conjunctiva/sclera: Conjunctivae normal.   Neck:      Comments: Neck pain and limited ROM  Cardiovascular:      Rate and Rhythm: Normal rate and regular rhythm.      Pulses: Normal pulses.      Heart sounds: Normal heart sounds, S1 normal and S2 normal.   Pulmonary:      Effort: Pulmonary effort is normal.      Breath sounds: Normal breath sounds.   Chest:   Breasts:     Left: Absent.      Comments: Left chest wall s/p mastectomy. Right breast normal.  No palpable axillary adenopathy bilaterally  Abdominal:      General: Abdomen is protuberant. Bowel sounds are normal.      Palpations: Abdomen is soft.   Musculoskeletal:      Cervical back: Rigidity present. Pain with movement present.   Skin:     General: Skin is warm.      Capillary Refill: Capillary refill takes less than 2 seconds.   Neurological:      Mental Status: She is alert.   Psychiatric:         Attention and Perception: Attention normal.         Mood and Affect: Mood normal.         Speech: Speech normal.         Behavior: Behavior normal. Behavior is cooperative.         Cognition and Memory: Cognition normal.         Judgment: Judgment normal.       ECOG SCORE    1 - Restricted in strenuous activity-ambulatory and able to carry out work of a light nature       No visits with results within 1 Week(s) from this visit.   Latest known visit with results is:   Lab Visit on 08/07/2023   Component Date Value    Sodium Level 08/07/2023 140     Potassium Level 08/07/2023 4.4     Chloride 08/07/2023 103     Carbon Dioxide 08/07/2023 31     Glucose Level 08/07/2023 119 (H)     Blood Urea Nitrogen 08/07/2023 15.3     Creatinine 08/07/2023 0.75     Calcium Level Total 08/07/2023 9.6     Protein Total 08/07/2023 6.8     Albumin Level 08/07/2023 4.2     Globulin 08/07/2023 2.6     Albumin/Globulin Ratio 08/07/2023 1.6     Bilirubin Total 08/07/2023 0.6     Alkaline Phosphatase 08/07/2023 64     Alanine Aminotransferase 08/07/2023 20     Aspartate Aminotransfera* 08/07/2023 28     eGFR 08/07/2023 >60     Cholesterol Total 08/07/2023 178     HDL Cholesterol 08/07/2023 36     Triglyceride 08/07/2023 260 (H)     Cholesterol/HDL Ratio 08/07/2023 5     Very Low Density Lipopro* 08/07/2023 52     LDL Cholesterol 08/07/2023 90.00     Urine Microalbumin 08/07/2023 57.8 (H)     Urine Creatinine 08/07/2023 104.9     Microalbumin Creatinine * 08/07/2023 55.1 (H)     Color, UA 08/07/2023 Yellow     Appearance, UA 08/07/2023  Clear     Specific Gravity, UA 08/07/2023 1.018     pH, UA 08/07/2023 6.5     Protein, UA 08/07/2023 Negative     Glucose, UA 08/07/2023 Negative     Ketones, UA 08/07/2023 Negative     Blood, UA 08/07/2023 Negative     Bilirubin, UA 08/07/2023 Negative     Urobilinogen, UA 08/07/2023 1.0     Nitrites, UA 08/07/2023 Negative     Leukocyte Esterase, UA 08/07/2023 1+ (A)     Thyroid Stimulating Horm* 08/07/2023 0.819     Hemoglobin A1c 08/07/2023 6.5     Estimated Average Glucose 08/07/2023 139.9     WBC 08/07/2023 6.29     RBC 08/07/2023 4.33     Hgb 08/07/2023 14.0     Hct 08/07/2023 42.5     MCV 08/07/2023 98.2 (H)     MCH 08/07/2023 32.3 (H)     MCHC 08/07/2023 32.9 (L)     RDW 08/07/2023 12.3     Platelet 08/07/2023 168     MPV 08/07/2023 10.0     Neut % 08/07/2023 57.3     Lymph % 08/07/2023 27.8     Mono % 08/07/2023 10.7     Eos % 08/07/2023 3.3     Basophil % 08/07/2023 0.6     Lymph # 08/07/2023 1.75     Neut # 08/07/2023 3.60     Mono # 08/07/2023 0.67     Eos # 08/07/2023 0.21     Baso # 08/07/2023 0.04     IG# 08/07/2023 0.02     IG% 08/07/2023 0.3     NRBC% 08/07/2023 0.0     RBC, UA 08/07/2023 0-2     WBC, UA 08/07/2023 3-5     Squamous Epithelial Cell* 08/07/2023 0-4     Bacteria, UA 08/07/2023 None Seen           Assessment:       Problem List Items Addressed This Visit          Oncology    Primary malignant neoplasm of breast - Primary       Other    Tobacco user          Plan:       Patient with stage IA left breast cancer s/p mastectomy done 7/8/19. Tumor measured 1cm, Grade 2, ER+ and DE+ and Her2 negative, node negative.  Per NCCN guidelines, can consider Oncotype DX testing.  However, due to patient's age and small size of tumor, chemotherapy felt to be of limited benefit.  I recommended treatment with AI X 5-10 years.    Currently patient is doing well without any signs or symptoms to suggest disease recurrence.  Patient started Femara on 8/15/19 and is tolerating well.   Recent labs with PCP  good.   Continue Femara.  Continue Calcium and vitamin D.  DEXA repeat last week shows normal bone density. Her left forearm was measured this time for some reason and it was -1.4. Since it was not measured in the past, not sure if this is stable,improved or worse. Since the remaining areas were good, will continue to monitor.   Encouraged her to continue Vitamin D3 and calcium daily.   Right screening MMG on 7/28/23 benign findings with recommendations for right breast screening in 1 year.  LDCT scan of chest for lung cancer surveillance done on 3/30/23 was Category 1, benign. Plan to repeat in 1 year. Will send a message to get this scheduled.   RTC 6 months for follow-up. She will be having labs done in February with her PCP so I will not order any here.   Smoking cessation encouraged.    All questions answered at this time.      Davian Nash, ALBERTO

## 2023-08-31 ENCOUNTER — HOSPITAL ENCOUNTER (OUTPATIENT)
Dept: RADIOLOGY | Facility: HOSPITAL | Age: 78
Discharge: HOME | End: 2023-08-31
Attending: NURSE PRACTITIONER
Payer: MEDICARE

## 2023-08-31 DIAGNOSIS — M85.89 OTHER SPECIFIED DISORDERS OF BONE DENSITY AND STRUCTURE, MULTIPLE SITES: ICD-10-CM

## 2023-08-31 DIAGNOSIS — N95.1 POST MENOPAUSAL SYNDROME: ICD-10-CM

## 2023-08-31 DIAGNOSIS — M85.9 DISORDER OF BONE DENSITY AND STRUCTURE, UNSPECIFIED: ICD-10-CM

## 2023-08-31 PROCEDURE — 77080 DXA BONE DENSITY AXIAL SKELETON 1 OR MORE SITES: ICD-10-PCS | Mod: 26,,, | Performed by: RADIOLOGY

## 2023-08-31 PROCEDURE — 77081 DXA BONE DENSITY APPENDICULR: CPT | Mod: 26,XU,, | Performed by: RADIOLOGY

## 2023-08-31 PROCEDURE — 77081 DEXA BONE DENSITY APPENDICULAR SKELETON: ICD-10-PCS | Mod: 26,XU,, | Performed by: RADIOLOGY

## 2023-08-31 PROCEDURE — 77080 DXA BONE DENSITY AXIAL: CPT | Mod: 26,,, | Performed by: RADIOLOGY

## 2023-08-31 PROCEDURE — 77081 DXA BONE DENSITY APPENDICULR: CPT | Mod: XU,TC

## 2023-08-31 PROCEDURE — 77080 DXA BONE DENSITY AXIAL: CPT | Mod: TC

## 2023-09-07 ENCOUNTER — OFFICE VISIT (OUTPATIENT)
Dept: HEMATOLOGY/ONCOLOGY | Facility: CLINIC | Age: 78
End: 2023-09-07
Payer: MEDICARE

## 2023-09-07 VITALS
HEART RATE: 75 BPM | BODY MASS INDEX: 25.49 KG/M2 | DIASTOLIC BLOOD PRESSURE: 52 MMHG | HEIGHT: 61 IN | TEMPERATURE: 98 F | WEIGHT: 135 LBS | SYSTOLIC BLOOD PRESSURE: 141 MMHG | RESPIRATION RATE: 15 BRPM | OXYGEN SATURATION: 94 %

## 2023-09-07 DIAGNOSIS — Z72.0 TOBACCO USER: ICD-10-CM

## 2023-09-07 DIAGNOSIS — C50.912 PRIMARY MALIGNANT NEOPLASM OF LEFT BREAST: Primary | ICD-10-CM

## 2023-09-07 PROCEDURE — 99214 OFFICE O/P EST MOD 30 MIN: CPT | Mod: PBBFAC | Performed by: NURSE PRACTITIONER

## 2023-09-07 PROCEDURE — 99213 OFFICE O/P EST LOW 20 MIN: CPT | Mod: S$PBB,,, | Performed by: NURSE PRACTITIONER

## 2023-09-07 PROCEDURE — 99213 PR OFFICE/OUTPT VISIT, EST, LEVL III, 20-29 MIN: ICD-10-PCS | Mod: S$PBB,,, | Performed by: NURSE PRACTITIONER

## 2023-09-07 PROCEDURE — 99999 PR PBB SHADOW E&M-EST. PATIENT-LVL IV: CPT | Mod: PBBFAC,,, | Performed by: NURSE PRACTITIONER

## 2023-09-07 PROCEDURE — 99999 PR PBB SHADOW E&M-EST. PATIENT-LVL IV: ICD-10-PCS | Mod: PBBFAC,,, | Performed by: NURSE PRACTITIONER

## 2023-09-27 DIAGNOSIS — R09.89 ABNORMAL CHEST SOUNDS: Primary | ICD-10-CM

## 2023-09-28 DIAGNOSIS — I10 ESSENTIAL HYPERTENSION: Primary | ICD-10-CM

## 2023-09-28 RX ORDER — LOSARTAN POTASSIUM AND HYDROCHLOROTHIAZIDE 12.5; 1 MG/1; MG/1
TABLET ORAL
Qty: 90 TABLET | Refills: 4 | Status: SHIPPED | OUTPATIENT
Start: 2023-09-28

## 2023-10-11 DIAGNOSIS — E78.5 DYSLIPIDEMIA: Primary | ICD-10-CM

## 2023-10-11 RX ORDER — ROSUVASTATIN CALCIUM 10 MG/1
TABLET, COATED ORAL
Qty: 90 TABLET | Refills: 4 | Status: SHIPPED | OUTPATIENT
Start: 2023-10-11

## 2023-11-09 LAB
LEFT EYE DM RETINOPATHY: NEGATIVE
RIGHT EYE DM RETINOPATHY: NEGATIVE

## 2023-12-07 ENCOUNTER — OFFICE VISIT (OUTPATIENT)
Dept: ORTHOPEDICS | Facility: CLINIC | Age: 78
End: 2023-12-07
Payer: MEDICARE

## 2023-12-07 ENCOUNTER — HOSPITAL ENCOUNTER (OUTPATIENT)
Dept: RADIOLOGY | Facility: CLINIC | Age: 78
Discharge: HOME | End: 2023-12-07
Attending: ORTHOPAEDIC SURGERY
Payer: MEDICARE

## 2023-12-07 VITALS
DIASTOLIC BLOOD PRESSURE: 69 MMHG | SYSTOLIC BLOOD PRESSURE: 144 MMHG | WEIGHT: 133 LBS | HEART RATE: 73 BPM | BODY MASS INDEX: 25.11 KG/M2 | HEIGHT: 61 IN

## 2023-12-07 DIAGNOSIS — M75.41 SUBACROMIAL IMPINGEMENT OF RIGHT SHOULDER: Primary | ICD-10-CM

## 2023-12-07 DIAGNOSIS — M25.511 RIGHT SHOULDER PAIN, UNSPECIFIED CHRONICITY: ICD-10-CM

## 2023-12-07 DIAGNOSIS — M75.101 ROTATOR CUFF SYNDROME OF RIGHT SHOULDER: ICD-10-CM

## 2023-12-07 DIAGNOSIS — M19.011 ARTHROSIS OF RIGHT ACROMIOCLAVICULAR JOINT: ICD-10-CM

## 2023-12-07 PROCEDURE — 99203 PR OFFICE/OUTPT VISIT, NEW, LEVL III, 30-44 MIN: ICD-10-PCS | Mod: ,,, | Performed by: ORTHOPAEDIC SURGERY

## 2023-12-07 PROCEDURE — 73030 X-RAY EXAM OF SHOULDER: CPT | Mod: RT,,, | Performed by: ORTHOPAEDIC SURGERY

## 2023-12-07 PROCEDURE — 99203 OFFICE O/P NEW LOW 30 MIN: CPT | Mod: ,,, | Performed by: ORTHOPAEDIC SURGERY

## 2023-12-07 PROCEDURE — 73030 XR SHOULDER COMPLETE 2 OR MORE VIEWS RIGHT: ICD-10-PCS | Mod: RT,,, | Performed by: ORTHOPAEDIC SURGERY

## 2023-12-07 NOTE — PROGRESS NOTES
[facility]  [facility]thopedic Clinic Note      Chief Complaint:   Chief Complaint   Patient presents with    Right Shoulder - Pain     Pt here for right shoulder, started hurting about a month ago. Felt something pop in her shoulder when wiping her legs off. Taking Mobic and Tylenol for pain as needed.      Referring Physician: No ref. provider found      History of Present Illness:    This is a 78 y.o. year old female presenting with complaints of right shoulder pain for approximately 1 month.  She states that she was wiping off her legs when getting out of the bathtub when she felt a popping sensation in her right shoulder.  This shoulder pain  was moderate to severe initially, but has significantly improved since the initial injury.   There was associated increased pain with overhead movement and night pain.   The pain was localized to the anterolateral shoulder pain  and radiated down the arm.  She has been taking previously prescribed meloxicam and Tylenol as needed for pain with continued symptoms.      Past Medical History:   Diagnosis Date    Hypothyroidism, unspecified        Past Surgical History:   Procedure Laterality Date    CATARACT EXTRACTION      HYSTERECTOMY      MASTECTOMY Left     NASAL SEPTUM SURGERY      NECK SURGERY      sentinal node         Current Outpatient Medications   Medication Sig    amitriptyline (ELAVIL) 25 MG tablet Take 1 tablet (25 mg total) by mouth every evening.    amLODIPine (NORVASC) 5 MG tablet TAKE 1 TABLET BY MOUTH TWICE A DAY    aspirin (ECOTRIN) 81 MG EC tablet aspirin 81 mg tablet,delayed release   Take 1 tablet every day by oral route.    blood sugar diagnostic Strp   Free style Lite Test strips, See Instructions, DX: E11.9 to test sugar daily, # 100 EA, 11 Refill(s)    calcium carbonate (OS-YRN) 600 mg calcium (1,500 mg) Tab Calcium 600 mg calcium (1,500 mg) tablet   Take 1 tablet every day by oral route.    EScitalopram oxalate (LEXAPRO) 20 MG tablet TAKE 1  TABLET BY MOUTH EVERY DAY    esomeprazole (NEXIUM) 40 MG capsule Take 1 capsule (40 mg total) by mouth once daily.    letrozole (FEMARA) 2.5 mg Tab Take 1 tablet (2.5 mg total) by mouth once daily.    levothyroxine (SYNTHROID) 88 MCG tablet TAKE 1 TABLET BY MOUTH EVERY DAY    losartan-hydrochlorothiazide 100-12.5 mg (HYZAAR) 100-12.5 mg Tab TAKE 1 TABLET BY MOUTH EVERY DAY    meloxicam (MOBIC) 7.5 MG tablet TAKE 1 TABLET BY MOUTH EVERY DAY    metoprolol succinate (TOPROL-XL) 100 MG 24 hr tablet TAKE 1 TABLET BY MOUTH EVERY DAY    omega 3-dha-epa-fish oil 1,000 mg (120 mg-180 mg) Cap Fish Oil 1,000 mg (120 mg-180 mg) capsule   Take 1 capsule every day by oral route.    rosuvastatin (CRESTOR) 10 MG tablet TAKE 1 TABLET BY MOUTH EVERY DAY IN EVENING    tiZANidine (ZANAFLEX) 4 MG tablet tizanidine 4 mg tablet    VASCEPA 1 gram Cap Take 2 capsules by mouth 2 (two) times a day.     No current facility-administered medications for this visit.       Review of patient's allergies indicates:   Allergen Reactions    Ace inhibitors Swelling    Metformin Diarrhea    Phenazopyridine     Nitrofurantoin monohyd/m-cryst Rash       Family History   Problem Relation Age of Onset    Pancreatic cancer Mother     Prostate cancer Father     Breast cancer Sister        Social History     Socioeconomic History    Marital status:    Tobacco Use    Smoking status: Every Day     Current packs/day: 0.50     Types: Cigarettes    Smokeless tobacco: Never   Substance and Sexual Activity    Alcohol use: Yes    Drug use: Never    Sexual activity: Not Currently     Social Determinants of Health     Physical Activity: Sufficiently Active (8/10/2023)    Exercise Vital Sign     Days of Exercise per Week: 5 days     Minutes of Exercise per Session: 30 min   Stress: No Stress Concern Present (8/10/2023)    [employer] [place] [employer] - Occupational Stress Questionnaire     Feeling of Stress : Not at all           Review of  "Systems:  All review of systems negative except for those stated in the HPI.    Examination:    Vital Signs:    Vitals:    12/07/23 1255   BP: (!) 144/69   Pulse: 73   Weight: 60.3 kg (133 lb)   Height: 5' 1" (1.549 m)   PainSc:   2       Body mass index is 25.13 kg/m².    Physical Examination:  General: Well-developed, well-nourished.  Neuro: Alert and oriented x 3.  Psych: Normal mood and affect.  Card: Regular rate and rhythm  Resp: Respirations regular and unlabored  Right Shoulder Exam:  Palpable bony protrusion over the AC joint. No medial or lateral scapula winging. Forward flexion to 160 degrees. Positive empty can, positive Whipple, Positive drop arm test, Gupta, impingement, Positive AC joint tenderness. Negative biceps groove tenderness, Positive Loomis´s, Yergason´s, Speed test. Negative Apprehension and Relocation test. 4/5 strength, normal skin appearance and palpable pulses distally. Sensibility normal.      Imaging: X-rays ordered and images interpreted today personally by me of four views of the right shoulder demonstrate severe acromioclavicular joint arthrosis with cystic changes of the distal clavicle and a large subacromial osteophyte.    Assessment: Subacromial impingement of right shoulder  -     X-ray Shoulder 2 or More Views Right; Future; Expected date: 12/07/2023    Rotator cuff syndrome of right shoulder    Arthrosis of right acromioclavicular joint        Plan:  X-rays were reviewed with the patient.  She does have a large subacromial osteophyte demonstrated on radiographs taken today.  This is likely causing some irritation to her rotator cuff tendon.  She may have exacerbated this with the motion that she was performing last month.  Given that she has had a significant amount of improvement in her symptoms, we will continue to monitor.  She will continue her previously prescribed meloxicam and over-the-counter Tylenol as needed.  Plan to provide her with a home exercise program.  She " will return to clinic as needed for any additional issues or concerns.  She verbalized understanding of the plan of care with no further questions.    Jordan Page MD personally performed the services described in this documentation, including but not limited to patient's history, physical examination, and assessment and plan of care. All medical record entries made by MARY Bryant were performed at his direction and in his presence. The medical record was reviewed and is accurate and complete.       Clinical Reference Documents Added to Patient Instructions         Document    ROTATOR CUFF TEAR EXERCISES (ENGLISH)               Follow up if symptoms worsen or fail to improve.      DISCLAIMER: This note may have been dictated using voice recognition software and may contain grammatical errors.     NOTE: Consult report sent to referring provider via [employer] EMR.

## 2023-12-20 DIAGNOSIS — I10 ESSENTIAL HYPERTENSION: ICD-10-CM

## 2023-12-20 RX ORDER — AMLODIPINE BESYLATE 5 MG/1
TABLET ORAL
Qty: 180 TABLET | Refills: 5 | Status: SHIPPED | OUTPATIENT
Start: 2023-12-20

## 2024-01-22 DIAGNOSIS — C50.912 PRIMARY MALIGNANT NEOPLASM OF LEFT BREAST: ICD-10-CM

## 2024-01-22 RX ORDER — LETROZOLE 2.5 MG/1
2.5 TABLET, FILM COATED ORAL
Qty: 90 TABLET | Refills: 0 | Status: SHIPPED | OUTPATIENT
Start: 2024-01-22 | End: 2024-04-30 | Stop reason: SDUPTHER

## 2024-01-26 DIAGNOSIS — Z00.00 WELLNESS EXAMINATION: ICD-10-CM

## 2024-01-26 RX ORDER — AMITRIPTYLINE HYDROCHLORIDE 25 MG/1
25 TABLET, FILM COATED ORAL NIGHTLY
Qty: 90 TABLET | Refills: 3 | Status: SHIPPED | OUTPATIENT
Start: 2024-01-26

## 2024-02-05 DIAGNOSIS — Z00.00 WELLNESS EXAMINATION: ICD-10-CM

## 2024-02-05 RX ORDER — METOPROLOL SUCCINATE 100 MG/1
TABLET, EXTENDED RELEASE ORAL
Qty: 90 TABLET | Refills: 3 | Status: SHIPPED | OUTPATIENT
Start: 2024-02-05

## 2024-02-06 ENCOUNTER — LAB VISIT (OUTPATIENT)
Dept: LAB | Facility: HOSPITAL | Age: 79
End: 2024-02-06
Attending: INTERNAL MEDICINE
Payer: MEDICARE

## 2024-02-06 DIAGNOSIS — I10 ESSENTIAL HYPERTENSION: ICD-10-CM

## 2024-02-06 DIAGNOSIS — I73.9 PERIPHERAL VASCULAR DISEASE: ICD-10-CM

## 2024-02-06 DIAGNOSIS — E55.9 VITAMIN D DEFICIENCY: ICD-10-CM

## 2024-02-06 DIAGNOSIS — E11.40 TYPE 2 DIABETES MELLITUS WITH DIABETIC NEUROPATHY, WITHOUT LONG-TERM CURRENT USE OF INSULIN: ICD-10-CM

## 2024-02-06 DIAGNOSIS — E11.9 TYPE 2 DIABETES MELLITUS WITHOUT COMPLICATION, WITHOUT LONG-TERM CURRENT USE OF INSULIN: ICD-10-CM

## 2024-02-06 DIAGNOSIS — E78.5 DYSLIPIDEMIA: ICD-10-CM

## 2024-02-06 DIAGNOSIS — C50.912 PRIMARY MALIGNANT NEOPLASM OF LEFT BREAST: ICD-10-CM

## 2024-02-06 DIAGNOSIS — I83.90 VARICOSE VEINS OF LOWER EXTREMITY, UNSPECIFIED LATERALITY, UNSPECIFIED WHETHER COMPLICATED: ICD-10-CM

## 2024-02-06 DIAGNOSIS — I70.209 ATHEROSCLEROSIS OF ARTERIES OF EXTREMITIES: ICD-10-CM

## 2024-02-06 DIAGNOSIS — Z00.00 WELLNESS EXAMINATION: ICD-10-CM

## 2024-02-06 DIAGNOSIS — E03.9 HYPOTHYROIDISM, UNSPECIFIED TYPE: ICD-10-CM

## 2024-02-06 DIAGNOSIS — J43.9 PULMONARY EMPHYSEMA, UNSPECIFIED EMPHYSEMA TYPE: ICD-10-CM

## 2024-02-06 LAB
ALBUMIN SERPL-MCNC: 4 G/DL (ref 3.4–4.8)
ALBUMIN/GLOB SERPL: 1.4 RATIO (ref 1.1–2)
ALP SERPL-CCNC: 65 UNIT/L (ref 40–150)
ALT SERPL-CCNC: 14 UNIT/L (ref 0–55)
APPEARANCE UR: CLEAR
AST SERPL-CCNC: 21 UNIT/L (ref 5–34)
BACTERIA #/AREA URNS AUTO: NORMAL /HPF
BASOPHILS # BLD AUTO: 0.04 X10(3)/MCL
BASOPHILS NFR BLD AUTO: 0.6 %
BILIRUB SERPL-MCNC: 0.6 MG/DL
BILIRUB UR QL STRIP.AUTO: NEGATIVE
BUN SERPL-MCNC: 20.2 MG/DL (ref 9.8–20.1)
CALCIUM SERPL-MCNC: 9.2 MG/DL (ref 8.4–10.2)
CHLORIDE SERPL-SCNC: 105 MMOL/L (ref 98–107)
CHOLEST SERPL-MCNC: 184 MG/DL
CHOLEST/HDLC SERPL: 5 {RATIO} (ref 0–5)
CO2 SERPL-SCNC: 27 MMOL/L (ref 23–31)
COLOR UR AUTO: NORMAL
CREAT SERPL-MCNC: 0.7 MG/DL (ref 0.55–1.02)
CREAT UR-MCNC: 96.8 MG/DL (ref 45–106)
EOSINOPHIL # BLD AUTO: 0.13 X10(3)/MCL (ref 0–0.9)
EOSINOPHIL NFR BLD AUTO: 2.1 %
ERYTHROCYTE [DISTWIDTH] IN BLOOD BY AUTOMATED COUNT: 12.1 % (ref 11.5–17)
EST. AVERAGE GLUCOSE BLD GHB EST-MCNC: 139.9 MG/DL
GFR SERPLBLD CREATININE-BSD FMLA CKD-EPI: >60 MLS/MIN/1.73/M2
GLOBULIN SER-MCNC: 2.8 GM/DL (ref 2.4–3.5)
GLUCOSE SERPL-MCNC: 127 MG/DL (ref 82–115)
GLUCOSE UR QL STRIP.AUTO: NORMAL
HBA1C MFR BLD: 6.5 %
HCT VFR BLD AUTO: 41.9 % (ref 37–47)
HDLC SERPL-MCNC: 35 MG/DL (ref 35–60)
HGB BLD-MCNC: 13.7 G/DL (ref 12–16)
IMM GRANULOCYTES # BLD AUTO: 0.02 X10(3)/MCL (ref 0–0.04)
IMM GRANULOCYTES NFR BLD AUTO: 0.3 %
KETONES UR QL STRIP.AUTO: NEGATIVE
LDLC SERPL CALC-MCNC: 94 MG/DL (ref 50–140)
LEUKOCYTE ESTERASE UR QL STRIP.AUTO: NEGATIVE
LYMPHOCYTES # BLD AUTO: 1.6 X10(3)/MCL (ref 0.6–4.6)
LYMPHOCYTES NFR BLD AUTO: 25.6 %
MCH RBC QN AUTO: 32.5 PG (ref 27–31)
MCHC RBC AUTO-ENTMCNC: 32.7 G/DL (ref 33–36)
MCV RBC AUTO: 99.3 FL (ref 80–94)
MICROALBUMIN UR-MCNC: 20.9 UG/ML
MICROALBUMIN/CREAT RATIO PNL UR: 21.6 MG/GM CR (ref 0–30)
MONOCYTES # BLD AUTO: 0.77 X10(3)/MCL (ref 0.1–1.3)
MONOCYTES NFR BLD AUTO: 12.3 %
NEUTROPHILS # BLD AUTO: 3.69 X10(3)/MCL (ref 2.1–9.2)
NEUTROPHILS NFR BLD AUTO: 59.1 %
NITRITE UR QL STRIP.AUTO: NEGATIVE
NRBC BLD AUTO-RTO: 0 %
PH UR STRIP.AUTO: 5.5 [PH]
PLATELET # BLD AUTO: 148 X10(3)/MCL (ref 130–400)
PMV BLD AUTO: 9.9 FL (ref 7.4–10.4)
POTASSIUM SERPL-SCNC: 4.1 MMOL/L (ref 3.5–5.1)
PROT SERPL-MCNC: 6.8 GM/DL (ref 5.8–7.6)
PROT UR QL STRIP.AUTO: NEGATIVE
RBC # BLD AUTO: 4.22 X10(6)/MCL (ref 4.2–5.4)
RBC #/AREA URNS AUTO: NORMAL /HPF
RBC UR QL AUTO: NEGATIVE
SODIUM SERPL-SCNC: 140 MMOL/L (ref 136–145)
SP GR UR STRIP.AUTO: 1.02 (ref 1–1.03)
SQUAMOUS #/AREA URNS LPF: NORMAL /HPF
T4 FREE SERPL-MCNC: 1.28 NG/DL (ref 0.7–1.48)
TRIGL SERPL-MCNC: 274 MG/DL (ref 37–140)
TSH SERPL-ACNC: 0.81 UIU/ML (ref 0.35–4.94)
UROBILINOGEN UR STRIP-ACNC: NORMAL
VLDLC SERPL CALC-MCNC: 55 MG/DL
WBC # SPEC AUTO: 6.25 X10(3)/MCL (ref 4.5–11.5)
WBC #/AREA URNS AUTO: NORMAL /HPF

## 2024-02-06 PROCEDURE — 84439 ASSAY OF FREE THYROXINE: CPT

## 2024-02-06 PROCEDURE — 83036 HEMOGLOBIN GLYCOSYLATED A1C: CPT

## 2024-02-06 PROCEDURE — 82043 UR ALBUMIN QUANTITATIVE: CPT

## 2024-02-06 PROCEDURE — 80061 LIPID PANEL: CPT

## 2024-02-06 PROCEDURE — 36415 COLL VENOUS BLD VENIPUNCTURE: CPT

## 2024-02-06 PROCEDURE — 80053 COMPREHEN METABOLIC PANEL: CPT

## 2024-02-06 PROCEDURE — 84443 ASSAY THYROID STIM HORMONE: CPT

## 2024-02-06 PROCEDURE — 81001 URINALYSIS AUTO W/SCOPE: CPT

## 2024-02-06 PROCEDURE — 85025 COMPLETE CBC W/AUTO DIFF WBC: CPT

## 2024-02-12 ENCOUNTER — HOSPITAL ENCOUNTER (EMERGENCY)
Facility: HOSPITAL | Age: 79
Discharge: HOME | End: 2024-02-12
Attending: FAMILY MEDICINE
Payer: MEDICARE

## 2024-02-12 VITALS
OXYGEN SATURATION: 95 % | TEMPERATURE: 98 F | DIASTOLIC BLOOD PRESSURE: 62 MMHG | HEIGHT: 61 IN | WEIGHT: 130 LBS | BODY MASS INDEX: 24.55 KG/M2 | RESPIRATION RATE: 20 BRPM | HEART RATE: 82 BPM | SYSTOLIC BLOOD PRESSURE: 176 MMHG

## 2024-02-12 DIAGNOSIS — S09.90XA INJURY OF HEAD, INITIAL ENCOUNTER: Primary | ICD-10-CM

## 2024-02-12 PROCEDURE — 99285 EMERGENCY DEPT VISIT HI MDM: CPT | Mod: 25

## 2024-02-12 PROCEDURE — 90471 IMMUNIZATION ADMIN: CPT | Performed by: FAMILY MEDICINE

## 2024-02-12 PROCEDURE — 12001 RPR S/N/AX/GEN/TRNK 2.5CM/<: CPT

## 2024-02-12 PROCEDURE — 63600175 PHARM REV CODE 636 W HCPCS: Performed by: FAMILY MEDICINE

## 2024-02-12 PROCEDURE — 90715 TDAP VACCINE 7 YRS/> IM: CPT | Performed by: FAMILY MEDICINE

## 2024-02-12 RX ORDER — LIDOCAINE HYDROCHLORIDE 10 MG/ML
5 INJECTION INFILTRATION; PERINEURAL ONCE
Status: DISCONTINUED | OUTPATIENT
Start: 2024-02-12 | End: 2024-02-12 | Stop reason: HOSPADM

## 2024-02-12 RX ORDER — LIDOCAINE AND PRILOCAINE 25; 25 MG/G; MG/G
CREAM TOPICAL
Status: DISCONTINUED | OUTPATIENT
Start: 2024-02-12 | End: 2024-02-12

## 2024-02-12 RX ORDER — LIDOCAINE 40 MG/G
CREAM TOPICAL
Status: DISCONTINUED | OUTPATIENT
Start: 2024-02-12 | End: 2024-02-12 | Stop reason: HOSPADM

## 2024-02-12 RX ORDER — DICLOFENAC SODIUM 50 MG/1
50 TABLET, DELAYED RELEASE ORAL 3 TIMES DAILY
Qty: 15 TABLET | Refills: 0 | Status: SHIPPED | OUTPATIENT
Start: 2024-02-12 | End: 2024-02-17

## 2024-02-12 RX ORDER — LIDOCAINE HYDROCHLORIDE 10 MG/ML
5 INJECTION INFILTRATION; PERINEURAL ONCE
Status: DISCONTINUED | OUTPATIENT
Start: 2024-02-12 | End: 2024-02-12

## 2024-02-12 RX ORDER — LIDOCAINE 40 MG/G
CREAM TOPICAL
Status: DISCONTINUED | OUTPATIENT
Start: 2024-02-12 | End: 2024-02-12

## 2024-02-12 RX ADMIN — TETANUS TOXOID, REDUCED DIPHTHERIA TOXOID AND ACELLULAR PERTUSSIS VACCINE, ADSORBED 0.5 ML: 5; 2.5; 8; 8; 2.5 SUSPENSION INTRAMUSCULAR at 06:02

## 2024-02-13 NOTE — ED NOTES
Care taken over at this time. No distress. Laceration to the back of the head. MD in room. Assessed patient and stated will need to numb with topical gel and prepare for staples. Patient taken to CT. A, A, O x 3. Family in room.

## 2024-02-13 NOTE — ED NOTES
MD in room. Cleanse scalp laceration with wound cleanser and covered with betadyne. MD in room applying staples. Tolerating well.

## 2024-02-13 NOTE — ED PROVIDER NOTES
Encounter Date: 2/12/2024       History     Chief Complaint   Patient presents with    Fall     Slipped and fell in bathroom -hit head on window seal -laceration to post skull--denies LOC     HPI  Review of patient's allergies indicates:   Allergen Reactions    Ace inhibitors Swelling    Metformin Diarrhea    Phenazopyridine     Nitrofurantoin monohyd/m-cryst Rash     Past Medical History:   Diagnosis Date    Hypothyroidism, unspecified      Past Surgical History:   Procedure Laterality Date    CATARACT EXTRACTION      HYSTERECTOMY      MASTECTOMY Left     NASAL SEPTUM SURGERY      NECK SURGERY      sentinal node       Family History   Problem Relation Age of Onset    Pancreatic cancer Mother     Prostate cancer Father     Breast cancer Sister      Social History     Tobacco Use    Smoking status: Every Day     Current packs/day: 0.50     Types: Cigarettes    Smokeless tobacco: Never   Substance Use Topics    Alcohol use: Yes    Drug use: Never     Review of Systems   Constitutional:  Negative for fever.   HENT:  Negative for sore throat.    Respiratory:  Negative for shortness of breath.    Cardiovascular:  Negative for chest pain.   Gastrointestinal:  Negative for nausea.   Genitourinary:  Negative for dysuria.   Musculoskeletal:  Negative for back pain.   Skin:  Positive for wound. Negative for rash.   Neurological:  Positive for headaches. Negative for weakness.   Hematological:  Does not bruise/bleed easily.   All other systems reviewed and are negative.      Physical Exam     Initial Vitals [02/12/24 1830]   BP Pulse Resp Temp SpO2   (!) 176/62 82 20 97.8 °F (36.6 °C) 95 %      MAP       --         Physical Exam    Nursing note and vitals reviewed.  Constitutional: She appears well-developed.   HENT:   Head: Normocephalic and atraumatic.   Right Ear: External ear normal.   Left Ear: External ear normal.   Nose: Nose normal.   Mouth/Throat: Oropharynx is clear and moist. No oropharyngeal exudate.   Eyes:  Conjunctivae and EOM are normal. Pupils are equal, round, and reactive to light. Right eye exhibits no discharge. Left eye exhibits no discharge.   Neck: Neck supple. No tracheal deviation present. No JVD present.   Normal range of motion.  Cardiovascular:  Normal rate, regular rhythm, normal heart sounds and intact distal pulses.     Exam reveals no gallop and no friction rub.       No murmur heard.  Pulmonary/Chest: Breath sounds normal. No stridor. No respiratory distress. She has no wheezes. She has no rhonchi. She has no rales.   Abdominal: Abdomen is soft. Bowel sounds are normal. She exhibits no distension and no mass. There is no abdominal tenderness. There is no rebound and no guarding.   Musculoskeletal:         General: Normal range of motion.      Cervical back: Normal range of motion and neck supple.     Neurological: She is alert and oriented to person, place, and time. She has normal strength. No cranial nerve deficit.   Skin: Skin is warm and dry. No rash and no abscess noted. No erythema.   Psychiatric: She has a normal mood and affect. Her behavior is normal. Judgment and thought content normal.         ED Course   Lac Repair    Date/Time: 2/12/2024 7:46 PM    Performed by: Romulo Grewal MD  Authorized by: Romulo Grewal MD    Consent:     Consent obtained:  Verbal    Consent given by:  Patient    Risks, benefits, and alternatives were discussed: yes      Risks discussed:  Infection, poor cosmetic result and poor wound healing  Universal protocol:     Procedure explained and questions answered to patient or proxy's satisfaction: yes      Patient identity confirmed:  Verbally with patient  Anesthesia:     Anesthesia method:  Local infiltration    Local anesthetic:  Lidocaine 1% w/o epi  Laceration details:     Location:  Scalp    Length (cm):  2  Pre-procedure details:     Preparation:  Patient was prepped and draped in usual sterile fashion  Exploration:     Limited defect created (wound  extended): no    Treatment:     Area cleansed with:  Povidone-iodine    Amount of cleaning:  Standard    Irrigation solution:  Sterile saline    Visualized foreign bodies/material removed: no      Debridement:  None    Undermining:  None    Scar revision: no    Skin repair:     Repair method:  Staples  Approximation:     Approximation:  Close  Repair type:     Repair type:  Simple  Post-procedure details:     Dressing:  Open (no dressing)    Procedure completion:  Tolerated    Labs Reviewed - No data to display       Imaging Results              CT Cervical Spine Without Contrast (Final result)  Result time 02/12/24 19:15:15      Final result by Liberty Morris MD (02/12/24 19:15:15)                   Impression:      No acute fracture identified.      Electronically signed by: Liberty Morris  Date:    02/12/2024  Time:    19:15               Narrative:    EXAMINATION:  CT CERVICAL SPINE WITHOUT CONTRAST    CLINICAL HISTORY:  Neck trauma (Age >= 65y);    TECHNIQUE:  Noncontrast CT images of the cervical spine. Axial, coronal, and sagittal reformatted images were obtained. Dose length product is 147 mGycm. Automatic exposure control, adjustment of mA/kV or iterative reconstruction technique was used to limit radiation dose.    COMPARISON:  None    FINDINGS:  The cervical spine is visualized through the level of T1.    There is no acute fracture identified.  There are postoperative changes with prior decompressive laminectomies at C3 through C7.  There are multilevel degenerative changes with disc height loss, marginal osteophyte formation and facet arthropathy.  There is no paraspinal hematoma.                                       CT Head Without Contrast (Final result)  Result time 02/12/24 19:12:53      Final result by Lbierty Morris MD (02/12/24 19:12:53)                   Impression:      1. No acute intracranial abnormality.  2. Chronic microvascular ischemic changes.      Electronically signed  by: Liberty Morris  Date:    02/12/2024  Time:    19:12               Narrative:    EXAMINATION:  CT HEAD WITHOUT CONTRAST    CLINICAL HISTORY:  Head trauma, moderate-severe;    TECHNIQUE:  Axial scans were obtained from skull base to the vertex.    Coronal and sagittal reconstructions obtained from the axial data.    Automatic exposure control was utilized to limit radiation dose.    Contrast: None    Radiation Dose:    Total DLP: 1105 mGy*cm    COMPARISON:  None    FINDINGS:  There is no acute intracranial hemorrhage or edema. The gray-white matter differentiation is preserved.  Patchy hypodensities in the subcortical and periventricular white matter likely represent chronic microvascular ischemic changes.    There is no mass effect or midline shift. The ventricles and sulci are normal in size. The basal cisterns are patent. There is no abnormal extra-axial fluid collection.    The calvarium and skull base are intact.  There is trace scattered paranasal sinus mucosal thickening.                                       Medications   Tdap (BOOSTRIX) vaccine injection 0.5 mL (0.5 mLs Intramuscular Given 2/12/24 3464)     Medical Decision Making  Amount and/or Complexity of Data Reviewed  Radiology: ordered.    Risk  Prescription drug management.                                      Clinical Impression:  Final diagnoses:  [S09.90XA] Injury of head, initial encounter (Primary)          ED Disposition Condition    Discharge Stable          ED Prescriptions       Medication Sig Dispense Start Date End Date Auth. Provider    diclofenac (VOLTAREN) 50 MG EC tablet Take 1 tablet (50 mg total) by mouth 3 (three) times daily. for 5 days 15 tablet 2/12/2024 2/17/2024 Romulo Grewal MD          Follow-up Information       Follow up With Specialties Details Why Contact Info    Mark Lawton II, MD Internal Medicine   [address] [place] [id number] [phone]               Romulo Grewal MD  02/16/24 [id number]

## 2024-02-15 ENCOUNTER — OFFICE VISIT (OUTPATIENT)
Dept: INTERNAL MEDICINE | Facility: CLINIC | Age: 79
End: 2024-02-15
Payer: MEDICARE

## 2024-02-15 VITALS
BODY MASS INDEX: 24.92 KG/M2 | TEMPERATURE: 98 F | SYSTOLIC BLOOD PRESSURE: 136 MMHG | RESPIRATION RATE: 16 BRPM | HEIGHT: 61 IN | OXYGEN SATURATION: 97 % | HEART RATE: 66 BPM | DIASTOLIC BLOOD PRESSURE: 88 MMHG | WEIGHT: 132 LBS

## 2024-02-15 DIAGNOSIS — L80 VITILIGO: Primary | ICD-10-CM

## 2024-02-15 DIAGNOSIS — I73.9 PERIPHERAL VASCULAR DISEASE: ICD-10-CM

## 2024-02-15 DIAGNOSIS — I70.209 ATHEROSCLEROSIS OF ARTERIES OF EXTREMITIES: ICD-10-CM

## 2024-02-15 DIAGNOSIS — Z12.2 ENCOUNTER FOR SCREENING FOR LUNG CANCER: ICD-10-CM

## 2024-02-15 DIAGNOSIS — R13.10 DYSPHAGIA, UNSPECIFIED TYPE: ICD-10-CM

## 2024-02-15 DIAGNOSIS — E11.40 TYPE 2 DIABETES MELLITUS WITH DIABETIC NEUROPATHY, WITHOUT LONG-TERM CURRENT USE OF INSULIN: ICD-10-CM

## 2024-02-15 DIAGNOSIS — Z00.00 WELLNESS EXAMINATION: ICD-10-CM

## 2024-02-15 DIAGNOSIS — J43.9 PULMONARY EMPHYSEMA, UNSPECIFIED EMPHYSEMA TYPE: ICD-10-CM

## 2024-02-15 DIAGNOSIS — E55.9 VITAMIN D DEFICIENCY: ICD-10-CM

## 2024-02-15 DIAGNOSIS — E78.5 DYSLIPIDEMIA: ICD-10-CM

## 2024-02-15 DIAGNOSIS — Z72.0 TOBACCO USER: ICD-10-CM

## 2024-02-15 DIAGNOSIS — E03.8 OTHER SPECIFIED HYPOTHYROIDISM: ICD-10-CM

## 2024-02-15 DIAGNOSIS — I10 ESSENTIAL HYPERTENSION: ICD-10-CM

## 2024-02-15 DIAGNOSIS — Z87.891 FORMER SMOKER: ICD-10-CM

## 2024-02-15 DIAGNOSIS — Z23 NEED FOR VACCINATION: ICD-10-CM

## 2024-02-15 DIAGNOSIS — C50.919 PRIMARY MALIGNANT NEOPLASM OF BREAST, UNSPECIFIED LATERALITY: ICD-10-CM

## 2024-02-15 PROCEDURE — 90677 PCV20 VACCINE IM: CPT | Mod: ,,, | Performed by: INTERNAL MEDICINE

## 2024-02-15 PROCEDURE — 99214 OFFICE O/P EST MOD 30 MIN: CPT | Mod: ,,, | Performed by: INTERNAL MEDICINE

## 2024-02-15 PROCEDURE — G0009 ADMIN PNEUMOCOCCAL VACCINE: HCPCS | Mod: ,,, | Performed by: INTERNAL MEDICINE

## 2024-02-15 NOTE — PROGRESS NOTES
"Subjective:       Patient ID: Marlin Herrera is a 78 y.o. female.      Patient Care Team:  Mark Lawton II, MD as PCP - General (Internal Medicine)  Patricia Cohen PA as Physician Assistant (Physician Assistant)    Chief Complaint: Diabetes, Vitamin D Deficiency, Hypothyroidism, Dyslipidemia, Peripheral Vascular Disease, Insomnia, and Gastroesophageal Reflux    78-year-old female seen today for followup of diabetes, hypertension, hypothyroidism, and hyperlipidemia among other conditions.       Review of Systems   Constitutional:  Negative for fever.   HENT:  Negative for nosebleeds.    Eyes:  Negative for visual disturbance.   Respiratory:  Negative for shortness of breath.    Cardiovascular:  Negative for chest pain.   Gastrointestinal:  Negative for abdominal pain.   Genitourinary:  Negative for dysuria.   Musculoskeletal:  Negative for gait problem.   Neurological:  Negative for headaches.           Patient Reported Health Risk Assessment         Objective:      Physical Exam  HENT:      Head: Normocephalic.      Mouth/Throat:      Pharynx: Oropharynx is clear.   Eyes:      Extraocular Movements: Extraocular movements intact.   Cardiovascular:      Rate and Rhythm: Normal rate.   Pulmonary:      Breath sounds: Normal breath sounds.   Abdominal:      Palpations: Abdomen is soft.   Musculoskeletal:         General: No swelling.   Skin:     General: Skin is warm.   Neurological:      General: No focal deficit present.      Mental Status: She is alert and oriented to person, place, and time.   Psychiatric:         Mood and Affect: Mood normal.         Vitals:    02/15/24 1359   BP: 136/88   Pulse: 66   Resp: 16   Temp: 97.8 °F (36.6 °C)   SpO2: 97%   Weight: 59.9 kg (132 lb)   Height: 5' 1" (1.549 m)                 No data to display                  2/15/2024     2:00 PM 9/7/2023     2:00 PM 8/10/2023     1:30 PM 2/7/2023     1:00 PM 1/26/2023     1:30 PM 8/2/2022     1:45 PM   Fall Risk Assessment - " Outpatient   Mobility Status Ambulatory Ambulatory Ambulatory Ambulatory Ambulatory Ambulatory   Number of falls 1 with injury 0 0 0 0 0   Identified as fall risk True False False False False False                  Assessment:       Problem List Items Addressed This Visit          Derm    Vitiligo - Primary    Relevant Orders    CBC Auto Differential    Comprehensive Metabolic Panel    Lipid Panel    Microalbumin/Creatinine Ratio, Urine    Urinalysis, Reflex to Urine Culture    T4, Free    TSH    Hemoglobin A1C       Pulmonary    Pulmonary emphysema, unspecified emphysema type    Relevant Orders    CBC Auto Differential    Comprehensive Metabolic Panel    Lipid Panel    Microalbumin/Creatinine Ratio, Urine    Urinalysis, Reflex to Urine Culture    T4, Free    TSH    Hemoglobin A1C       Cardiac/Vascular    Atherosclerosis of arteries of extremities    Relevant Orders    CBC Auto Differential    Comprehensive Metabolic Panel    Lipid Panel    Microalbumin/Creatinine Ratio, Urine    Urinalysis, Reflex to Urine Culture    T4, Free    TSH    Hemoglobin A1C    Essential hypertension    Relevant Orders    CBC Auto Differential    Comprehensive Metabolic Panel    Lipid Panel    Microalbumin/Creatinine Ratio, Urine    Urinalysis, Reflex to Urine Culture    T4, Free    TSH    Hemoglobin A1C    Peripheral vascular disease    Relevant Orders    CBC Auto Differential    Comprehensive Metabolic Panel    Lipid Panel    Microalbumin/Creatinine Ratio, Urine    Urinalysis, Reflex to Urine Culture    T4, Free    TSH    Hemoglobin A1C    Dyslipidemia    Relevant Orders    CBC Auto Differential    Comprehensive Metabolic Panel    Lipid Panel    Microalbumin/Creatinine Ratio, Urine    Urinalysis, Reflex to Urine Culture    T4, Free    TSH    Hemoglobin A1C       Oncology    Primary malignant neoplasm of breast    Relevant Orders    CBC Auto Differential    Comprehensive Metabolic Panel    Lipid Panel    Microalbumin/Creatinine Ratio,  Urine    Urinalysis, Reflex to Urine Culture    T4, Free    TSH    Hemoglobin A1C       Endocrine    Type 2 diabetes mellitus with diabetic neuropathy, without long-term current use of insulin    Relevant Orders    CBC Auto Differential    Comprehensive Metabolic Panel    Lipid Panel    Microalbumin/Creatinine Ratio, Urine    Urinalysis, Reflex to Urine Culture    T4, Free    TSH    Hemoglobin A1C    Vitamin D deficiency    Relevant Orders    CBC Auto Differential    Comprehensive Metabolic Panel    Lipid Panel    Microalbumin/Creatinine Ratio, Urine    Urinalysis, Reflex to Urine Culture    T4, Free    TSH    Hemoglobin A1C    Hypothyroidism, unspecified    Relevant Orders    CBC Auto Differential    Comprehensive Metabolic Panel    Lipid Panel    Microalbumin/Creatinine Ratio, Urine    Urinalysis, Reflex to Urine Culture    T4, Free    TSH    Hemoglobin A1C       Other    RESOLVED: Wellness examination    Relevant Orders    CBC Auto Differential    Comprehensive Metabolic Panel    Lipid Panel    Microalbumin/Creatinine Ratio, Urine    Urinalysis, Reflex to Urine Culture    T4, Free    TSH    Hemoglobin A1C       Medication List with Changes/Refills   Current Medications    AMITRIPTYLINE (ELAVIL) 25 MG TABLET    Take 1 tablet (25 mg total) by mouth every evening.    AMLODIPINE (NORVASC) 5 MG TABLET    TAKE 1 TABLET BY MOUTH TWICE A DAY    ASPIRIN (ECOTRIN) 81 MG EC TABLET    aspirin 81 mg tablet,delayed release   Take 1 tablet every day by oral route.    BLOOD SUGAR DIAGNOSTIC STRP      Free style Lite Test strips, See Instructions, DX: E11.9 to test sugar daily, # 100 EA, 11 Refill(s)    CALCIUM CARBONATE (OS-YRN) 600 MG CALCIUM (1,500 MG) TAB    Calcium 600 mg calcium (1,500 mg) tablet   Take 1 tablet every day by oral route.    DICLOFENAC (VOLTAREN) 50 MG EC TABLET    Take 1 tablet (50 mg total) by mouth 3 (three) times daily. for 5 days    ESCITALOPRAM OXALATE (LEXAPRO) 20 MG TABLET    TAKE 1 TABLET BY MOUTH  EVERY DAY    ESOMEPRAZOLE (NEXIUM) 40 MG CAPSULE    Take 1 capsule (40 mg total) by mouth once daily.    LETROZOLE (FEMARA) 2.5 MG TAB    TAKE ONE TABLET BY MOUTH ONCE DAILY    LEVOTHYROXINE (SYNTHROID) 88 MCG TABLET    TAKE 1 TABLET BY MOUTH EVERY DAY    LOSARTAN-HYDROCHLOROTHIAZIDE 100-12.5 MG (HYZAAR) 100-12.5 MG TAB    TAKE 1 TABLET BY MOUTH EVERY DAY    MELOXICAM (MOBIC) 7.5 MG TABLET    TAKE 1 TABLET BY MOUTH EVERY DAY    METOPROLOL SUCCINATE (TOPROL-XL) 100 MG 24 HR TABLET    TAKE 1 TABLET BY MOUTH EVERY DAY    OMEGA 3-DHA-EPA-FISH OIL 1,000 MG (120 MG-180 MG) CAP    Fish Oil 1,000 mg (120 mg-180 mg) capsule   Take 1 capsule every day by oral route.    ROSUVASTATIN (CRESTOR) 10 MG TABLET    TAKE 1 TABLET BY MOUTH EVERY DAY IN EVENING    TIZANIDINE (ZANAFLEX) 4 MG TABLET    tizanidine 4 mg tablet   Discontinued Medications    VASCEPA 1 GRAM CAP    Take 2 capsules by mouth 2 (two) times a day.        Plan:       1. Diabetes: Hemoglobin A1c is 6.5. Metformin was stopped because of diarrhea or dyspepsia. She is no longer on medication, yet her glucose is very well controlled     2. Hypertension: Stable     3. Hyperlipidemia: LDL at goal. Calcium score 1.3 in 2018     4. Cervical spinal stenosis: She had surgery in 2013. Continue meloxicam. She saw pain management once (Dr. Smallwood assistant) in 2022 and they suggested Toradol injection PRN.     5. Murguia's esophagus: EGD in 5/2021 with Dr. Felton. Refer because of dysphagia     6. Gastroesophageal reflux disease: Restart Nexium     7. Hypothyroidism: TSH normal     8. Insomnia: She has worsening insomnia. Decreased amitriptyline back to 25mg because of drowsiness      9. Vitamin D deficiency: Continue vitamin D     10. Osteoarthritis: L knee pain worsening. Referred to Ortho previously, considering knee replacement     11. Urinary tract infection: Treated with Cipro in 2022     12. Tobacco use: She smokes half a pack per day. Lungs screening CT negative in  3/2023, repeat     13. Stage IA left breast cancer: Diagnosed in 2019 status post mastectomy. Continue Femara. Imaging 2023     14. Peripheral vascular disease: CT angiogram of the lower extremities showed diffuse disease. She has claudication and pain in her lower back and right leg. She will F/U with Dr. Steinberg and Dr. Valentino. Continue aspirin 81mg daily     15. Vitiligo: Her brother has vitiligo as well     16. Wellness: Colonoscopy normal in 2020. Pneumovax 2021, pneumonia shot today. MMG/ultrasound 2023       Lungs are clear on imaging.  Likely an element of emphysema.      Her partner, Mr. Madison, had metastatic lung cancer and  in 2019      Medicare Annual Wellness and Personalized Prevention Plan:   Fall Risk + Home Safety + Hearing Impairment + Depression Screen + Cognitive Impairment Screen + Health Risk Assessment all reviewed    Health Maintenance Topics with due status: Not Due       Topic Last Completion Date    DEXA Scan 2023    Aspirin/Antiplatelet Therapy 2023    Diabetes Urine Screening 2024    Lipid Panel 2024    Hemoglobin A1c 2024    TETANUS VACCINE 2024      The patient's Health Maintenance was reviewed and the following appears to be due at this time:   Health Maintenance Due   Topic Date Due    Pneumococcal Vaccines (Age 65+) (3 of 3 - PCV) 2022    Eye Exam  2023       Advance Care Planning   I attest to discussing Advance Care Planning with patient and/or family member.  Education was provided including the importance of the Health Care Power of , Advance Directives, and/or [place] documentation.  The patient expressed understanding to the importance of this information and discussion.  Length of ACP conversation in minutes: 0       Opioid Screening: Patient medication list reviewed, patient is not taking prescription opioids. Patient is not using additional opioids than prescribed. Patient is at low risk of substance  abuse based on this opioid use history.     No follow-ups on file. In addition to their scheduled follow up, the patient has also been instructed to follow up on as needed basis.

## 2024-03-12 NOTE — PROGRESS NOTES
Subjective:       Patient ID: Marlin Herrera is a 78 y.o. female.    Surgeon: Dr. Emelina Leonard    Left Breast Cancer Stage IA ([id number]) Diagnosed 5/22/19  Biopsy/pathology: Left breast 6:00 periareolar lesion biopsy done 5/22/19--invasive ductal carcinoma Grade 2, DCIS Grade 2, proliferative fibrocystic changes with multifocal atypical lobular hyperplasia, ER 99.58%, HI 47.36%, Her2 Equivocal by IHC 2+, negative by FISH.  Surgery/pathology: Left breast mastectomy done 7/8/19--infiltrating ductal carcinoma, NOS type, Grade 2, unifocal measures 1cm in greatest dimension, associated foci of DCIS grade 2, 8mm in greatest extent, margins free, no vascular invasion, 5 sentinel and 1 axillary lymph node all negative ([id number]).  Imaging:  Screening MMG 4/17/19--developing focal asymmetry with calcifications in left breast at 6:00, needs additional imaging BIRADS 0.  Left breast Reji diagnostic MMG/US done 5/14/19--Left breast 6:00 periareolar position mass measures 0.9X1.6X0.6cm with associated calcifications, suspicious BIRADS 4.  CT chest LD screening done 3/25/2021--Stable tiny left lower lobe nodule, few punctate nodules not clearly seen on prior, for example 3 mm right upper lobe nodule, right upper lobe 2 mm nodule anteriorly, benign, recommend repeat in 12 months.    DEXA:  9/26/19--AP Spine T= -0.2, Femur neck left 0.1, right 0.2, Femur total left 0.6, right 0.0 Normal bone density.  9/30/21--AP Spine T= -0.4, Femur neck left 0.1, right 0.4, Femur total left 0.2, right -0.3, normal bone density, mixed findings.  8/31/23--AP spine -0.4, left femur neck 0.1, left total 0.1, right femur neck 0.2, right total -0.4, left forearm -1.4.     Treatment plan: Femara X 5-10 years started 8/15/19.       Chief Complaint: Other Misc (Pt reports no concerns today.)    HPI   Patient presents for follow-up of breast cancer. She continues on the Femara without any problems. No new complaints reported. She is still smoking but she  has cut back. She does have claudication and is being followed by vascular surgery. She plans on calling the LDCT department to get her scan scheduled. No other problems reported.     Past Medical History:   Diagnosis Date    Hypothyroidism, unspecified       Review of patient's allergies indicates:   Allergen Reactions    Ace inhibitors Swelling    Lisinopril     Metformin Diarrhea    Phenazopyridine     Nitrofurantoin monohyd/m-cryst Rash        Current Outpatient Medications:     amitriptyline (ELAVIL) 25 MG tablet, Take 1 tablet (25 mg total) by mouth every evening., Disp: 90 tablet, Rfl: 3    amLODIPine (NORVASC) 5 MG tablet, TAKE 1 TABLET BY MOUTH TWICE A DAY, Disp: 180 tablet, Rfl: 5    aspirin (ECOTRIN) 81 MG EC tablet, aspirin 81 mg tablet,delayed release  Take 1 tablet every day by oral route., Disp: , Rfl:     blood sugar diagnostic Strp,  Free style Lite Test strips, See Instructions, DX: E11.9 to test sugar daily, # 100 EA, 11 Refill(s), Disp: , Rfl:     calcium carbonate (OS-YRN) 600 mg calcium (1,500 mg) Tab, Calcium 600 mg calcium (1,500 mg) tablet  Take 1 tablet every day by oral route., Disp: , Rfl:     EScitalopram oxalate (LEXAPRO) 20 MG tablet, TAKE 1 TABLET BY MOUTH EVERY DAY, Disp: 90 tablet, Rfl: 3    esomeprazole (NEXIUM) 40 MG capsule, Take 1 capsule (40 mg total) by mouth once daily., Disp: 90 capsule, Rfl: 3    letrozole (FEMARA) 2.5 mg Tab, TAKE ONE TABLET BY MOUTH ONCE DAILY, Disp: 90 tablet, Rfl: 0    levothyroxine (SYNTHROID) 88 MCG tablet, TAKE 1 TABLET BY MOUTH EVERY DAY, Disp: 90 tablet, Rfl: 3    losartan-hydrochlorothiazide 100-12.5 mg (HYZAAR) 100-12.5 mg Tab, TAKE 1 TABLET BY MOUTH EVERY DAY, Disp: 90 tablet, Rfl: 4    meloxicam (MOBIC) 7.5 MG tablet, TAKE 1 TABLET BY MOUTH EVERY DAY, Disp: 90 tablet, Rfl: 3    metoprolol succinate (TOPROL-XL) 100 MG 24 hr tablet, TAKE 1 TABLET BY MOUTH EVERY DAY, Disp: 90 tablet, Rfl: 3    omega 3-dha-epa-fish oil 1,000 mg (120 mg-180 mg) Cap,  Fish Oil 1,000 mg (120 mg-180 mg) capsule  Take 1 capsule every day by oral route., Disp: , Rfl:     rosuvastatin (CRESTOR) 10 MG tablet, TAKE 1 TABLET BY MOUTH EVERY DAY IN EVENING, Disp: 90 tablet, Rfl: 4    tiZANidine (ZANAFLEX) 4 MG tablet, tizanidine 4 mg tablet, Disp: , Rfl:     Review of Systems   Constitutional:  Negative for activity change, fever and unexpected weight change.   Eyes:  Negative for visual disturbance.   Respiratory:  Negative for cough and shortness of breath.    Cardiovascular:  Negative for chest pain.        PVD with claudication pain   Gastrointestinal:  Negative for abdominal pain, blood in stool, constipation, diarrhea, nausea and vomiting.   Genitourinary:  Negative for difficulty urinating.   Musculoskeletal:  Positive for neck pain and neck stiffness. Negative for back pain.   Integumentary:  Negative for rash.   Neurological:  Negative for dizziness, weakness and headaches.   Psychiatric/Behavioral:  Negative for behavioral problems and suicidal ideas.          Vitals:    03/19/24 1359   BP: (!) 150/62   Pulse: 72   Resp: 14   Temp: 98.2 °F (36.8 °C)     Physical Exam  Vitals reviewed.   Constitutional:       Appearance: Normal appearance. She is normal weight.   HENT:      Head: Normocephalic.   Eyes:      General: Lids are normal. Vision grossly intact.      Extraocular Movements: Extraocular movements intact.      Conjunctiva/sclera: Conjunctivae normal.   Neck:      Comments: Neck pain and limited ROM  Cardiovascular:      Rate and Rhythm: Normal rate and regular rhythm.      Pulses: Normal pulses.      Heart sounds: Normal heart sounds, S1 normal and S2 normal.   Pulmonary:      Effort: Pulmonary effort is normal.      Breath sounds: Normal breath sounds.   Chest:   Breasts:     Left: Absent.      Comments: Left chest wall s/p mastectomy. Right breast normal. No palpable axillary adenopathy bilaterally  Abdominal:      General: Abdomen is protuberant. Bowel sounds are normal.       Palpations: Abdomen is soft.   Musculoskeletal:      Cervical back: Rigidity present. Pain with movement present.   Skin:     General: Skin is warm.      Capillary Refill: Capillary refill takes less than 2 seconds.   Neurological:      Mental Status: She is alert.   Psychiatric:         Attention and Perception: Attention normal.         Mood and Affect: Mood normal.         Speech: Speech normal.         Behavior: Behavior normal. Behavior is cooperative.         Cognition and Memory: Cognition normal.         Judgment: Judgment normal.       ECOG SCORE    2 - Capable of all selfcare but unable to carry out any work activities, active > 50% of hours       No visits with results within 1 Week(s) from this visit.   Latest known visit with results is:   Lab Visit on 02/06/2024   Component Date Value    Sodium Level 02/06/2024 140     Potassium Level 02/06/2024 4.1     Chloride 02/06/2024 105     Carbon Dioxide 02/06/2024 27     Glucose Level 02/06/2024 127 (H)     Blood Urea Nitrogen 02/06/2024 20.2 (H)     Creatinine 02/06/2024 0.70     Calcium Level Total 02/06/2024 9.2     Protein Total 02/06/2024 6.8     Albumin Level 02/06/2024 4.0     Globulin 02/06/2024 2.8     Albumin/Globulin Ratio 02/06/2024 1.4     Bilirubin Total 02/06/2024 0.6     Alkaline Phosphatase 02/06/2024 65     Alanine Aminotransferase 02/06/2024 14     Aspartate Aminotransfera* 02/06/2024 21     eGFR 02/06/2024 >60     Cholesterol Total 02/06/2024 184     HDL Cholesterol 02/06/2024 35     Triglyceride 02/06/2024 274 (H)     Cholesterol/HDL Ratio 02/06/2024 5     Very Low Density Lipopro* 02/06/2024 55     LDL Cholesterol 02/06/2024 94.00     Urine Microalbumin 02/06/2024 20.9     Urine Creatinine 02/06/2024 96.8     Microalbumin Creatinine * 02/06/2024 21.6     Color, UA 02/06/2024 Light-Yellow     Appearance, UA 02/06/2024 Clear     Specific Gravity, UA 02/06/2024 1.022     pH, UA 02/06/2024 5.5     Protein, UA 02/06/2024 Negative      Glucose, UA 02/06/2024 Normal     Ketones, UA 02/06/2024 Negative     Blood, UA 02/06/2024 Negative     Bilirubin, UA 02/06/2024 Negative     Urobilinogen, UA 02/06/2024 Normal     Nitrites, UA 02/06/2024 Negative     Leukocyte Esterase, UA 02/06/2024 Negative     WBC, UA 02/06/2024 0-5     Bacteria, UA 02/06/2024 None Seen     Squamous Epithelial Cell* 02/06/2024 Trace     RBC, UA 02/06/2024 0-5     Thyroxine Free 02/06/2024 1.28     TSH 02/06/2024 0.814     Hemoglobin A1c 02/06/2024 6.5     Estimated Average Glucose 02/06/2024 139.9     WBC 02/06/2024 6.25     RBC 02/06/2024 4.22     Hgb 02/06/2024 13.7     Hct 02/06/2024 41.9     MCV 02/06/2024 99.3 (H)     MCH 02/06/2024 32.5 (H)     MCHC 02/06/2024 32.7 (L)     RDW 02/06/2024 12.1     Platelet 02/06/2024 148     MPV 02/06/2024 9.9     Neut % 02/06/2024 59.1     Lymph % 02/06/2024 25.6     Mono % 02/06/2024 12.3     Eos % 02/06/2024 2.1     Basophil % 02/06/2024 0.6     Lymph # 02/06/2024 1.60     Neut # 02/06/2024 3.69     Mono # 02/06/2024 0.77     Eos # 02/06/2024 0.13     Baso # 02/06/2024 0.04     IG# 02/06/2024 0.02     IG% 02/06/2024 0.3     NRBC% 02/06/2024 0.0           Assessment:       Problem List Items Addressed This Visit          Oncology    Primary malignant neoplasm of breast - Primary          Plan:       Patient with stage IA left breast cancer s/p mastectomy done 7/8/19. Tumor measured 1cm, Grade 2, ER+ and UT+ and Her2 negative, node negative.  Per NCCN guidelines, can consider Oncotype DX testing.  However, due to patient's age and small size of tumor, chemotherapy felt to be of limited benefit.  I recommended treatment with AI X 5-10 years.    Currently patient is doing well without any signs or symptoms to suggest disease recurrence.  Patient started Femara on 8/15/19 and is tolerating well.   Recent labs with PCP good last month.   Continue Femara.  Continue Calcium and vitamin D.  DEXA in August 2023 with normal bone density. Her left  forearm was measured this time for some reason and it was -1.4. Since it was not measured in the past, not sure if this is stable,improved or worse. Since the remaining areas were good, will continue to monitor.   Encouraged her to continue Vitamin D3 and calcium daily.   Right screening MMG on 7/28/23 benign findings with recommendations for right breast screening in 1 year. Will send orders today.   LDCT scan of chest for lung cancer surveillance done on 3/30/23 was Category 1, benign. Plan to repeat in 1 year which has been ordered by her PCP. She plans to call and get this scheduled.   RTC 6 months for follow-up. No need to repeat labs here since her PCP has ordered.   Smoking cessation encouraged.    All questions answered at this time.      Davian Nash, PC

## 2024-03-19 ENCOUNTER — OFFICE VISIT (OUTPATIENT)
Dept: HEMATOLOGY/ONCOLOGY | Facility: CLINIC | Age: 79
End: 2024-03-19
Payer: MEDICARE

## 2024-03-19 VITALS
HEIGHT: 61 IN | WEIGHT: 135.13 LBS | BODY MASS INDEX: 25.51 KG/M2 | RESPIRATION RATE: 14 BRPM | HEART RATE: 72 BPM | DIASTOLIC BLOOD PRESSURE: 62 MMHG | OXYGEN SATURATION: 94 % | TEMPERATURE: 98 F | SYSTOLIC BLOOD PRESSURE: 150 MMHG

## 2024-03-19 DIAGNOSIS — Z12.31 ENCOUNTER FOR SCREENING MAMMOGRAM FOR BREAST CANCER: ICD-10-CM

## 2024-03-19 DIAGNOSIS — C50.912 PRIMARY MALIGNANT NEOPLASM OF LEFT BREAST: Primary | ICD-10-CM

## 2024-03-19 PROBLEM — Z12.11 ENCOUNTER FOR SCREENING FOR MALIGNANT NEOPLASM OF COLON: Status: ACTIVE | Noted: 2020-10-28

## 2024-03-19 PROBLEM — K22.89 OTHER SPECIFIED DISEASE OF ESOPHAGUS: Status: ACTIVE | Noted: 2021-05-25

## 2024-03-19 PROBLEM — E03.9 HYPOTHYROIDISM: Status: ACTIVE | Noted: 2024-03-19

## 2024-03-19 PROBLEM — K44.9 HIATAL HERNIA: Status: ACTIVE | Noted: 2024-03-05

## 2024-03-19 PROBLEM — K44.9 DIAPHRAGMATIC HERNIA WITHOUT OBSTRUCTION OR GANGRENE: Status: ACTIVE | Noted: 2022-05-24

## 2024-03-19 PROBLEM — K22.2 ESOPHAGEAL OBSTRUCTION: Status: ACTIVE | Noted: 2024-03-05

## 2024-03-19 PROBLEM — R13.10 DYSPHAGIA, UNSPECIFIED: Status: ACTIVE | Noted: 2024-02-19

## 2024-03-19 PROCEDURE — 99999 PR PBB SHADOW E&M-EST. PATIENT-LVL IV: CPT | Mod: PBBFAC,,, | Performed by: NURSE PRACTITIONER

## 2024-03-19 PROCEDURE — 99213 OFFICE O/P EST LOW 20 MIN: CPT | Mod: S$PBB,,, | Performed by: NURSE PRACTITIONER

## 2024-03-19 PROCEDURE — 99214 OFFICE O/P EST MOD 30 MIN: CPT | Mod: PBBFAC | Performed by: NURSE PRACTITIONER

## 2024-04-03 NOTE — PROGRESS NOTES
"    [facility] - Clinic Note  Orlando Steinberg MD      Patient Name: Marlin Hererra                   : 1945      MRN: [id number]   Visit Date: 2024       History Present Illness     Reason for Visit: Carotid Artery Disease    Ms. Herrera presents to the clinic for 1 year follow up of carotid artery disease. She denies signs or symptoms of stroke or TIA. Carotid duplex obtained today.   One fall in the bathroom that required stitches otherwise no significant health changes.   Some balance issues.  Can walk up to 30 minutes without stopping. Hardest for her to get up and moving but does well with ambulation once she is going. States she isn't having much swelling.         REVIEW OF SYSTEMS:  12 point review of systems conducted, negative except as stated in the history of present illness. See HPI for details.        Physical Exam      Vitals:    24 1032   BP: (!) 165/75   BP Location: Right arm   Pulse: 67   Weight: 59 kg (130 lb)   Height: 5' 1" (1.549 m)        General: well-nourished, no acute distress, and healthy appearing, ambulating normally, alert, pleasant, conversant, and oriented  Neurologic: cranial nerves are grossly intact, no neurologic deficits, no motor deficits, and no sensory deficits  HEENT: grossly normal and no scleral icterus  Neck/Chest: normal , soft without lymphadenopathy, palpable carotid pulses bilaterally, and prominent glands  without concerning features  Respiratory: breathing easily and without respiratory distress  Abdomen: normal and soft  Cardiology: regular rate and rhythm    Upper Extremity Arterial Exam:   Right - radial is palpable  Left - radial is palpable    Lower Extremity Arterial Exam:  Right - posterior tibial is non-palpable and dorsalis pedis is non-palpable  Left - posterior tibial is non-palpable and dorsalis pedis is non-palpable    Musculoskeletal:   Upper Extremity: normal bilateral hand function  Lower Extremity: no edema present to " bilateral lower extremities                 Assessment and Plan     Ms. Herrera is a 78 y.o. with asymptomatic carotid artery stenosis. Carotid duplex obtained today demonstrates RIGHT carotid with mild stenosis with a peak systolic velocity of 95 cm/s. LEFT carotid demonstrates mild stenosis with a peak systolic velocity of 101 cm/s. Recommend follow up in 1 year with repeat carotid duplex. Continue Aspirin and statin therapy.    In regard to her lower extremity, she has known bilateral superficial femoral artery disease noted on previous CT in 2020. She has nonpalpable pedal pulses. She is not limited in her ability to ambulate and do daily activities. Her described symptoms are atypical for arterial insufficiency and more suggestive of arthritic issues. Recommend continued symptom surveillance. If she becomes limited with ambulation distances, would be willing to further investigate her arterial disease.         1. Bilateral carotid artery stenosis  - CV Ultrasound Bilateral Doppler Carotid; Future    2. Atherosclerosis of arteries of extremities    3. Smoker  We discussed the detrimental effects of tobacco use on the vascular system and overall health. There was an emphasis on achieving complete cessation.          Imaging Obtained/Reviewed   Study: carotid duplex  Date:   4/9/24        Medical History     Past Medical History:   Diagnosis Date    Arterial disease     Arthritis     Atherosclerosis of arteries of extremities     Breast cancer     Diabetes     HTN (hypertension)     Thyroid disease     Varicose veins of both lower extremities     Venous insufficiency      Past Surgical History:   Procedure Laterality Date    CATARACT EXTRACTION      HYSTERECTOMY      MASTECTOMY Left 07/2020    NASAL SEPTUM SURGERY      NECK SURGERY       Family History   Problem Relation Age of Onset    Pancreatic cancer Mother     Prostate cancer Father     Breast cancer Sister      Social History     Socioeconomic History     Marital status:    Tobacco Use    Smoking status: Every Day     Current packs/day: 0.50     Types: Cigarettes    Smokeless tobacco: Never   Substance and Sexual Activity    Alcohol use: Yes    Drug use: Never    Sexual activity: Not Currently     Social Determinants of Health     Physical Activity: Sufficiently Active (8/10/2023)    Exercise Vital Sign     Days of Exercise per Week: 5 days     Minutes of Exercise per Session: 30 min   Stress: No Stress Concern Present (8/10/2023)    [employer] - Occupational Stress Questionnaire     Feeling of Stress : Not at all     Current Outpatient Medications   Medication Instructions    amitriptyline (ELAVIL) 25 mg, Oral, Nightly    amLODIPine (NORVASC) 5 MG tablet TAKE 1 TABLET BY MOUTH TWICE A DAY    aspirin (ECOTRIN) 81 MG EC tablet aspirin 81 mg tablet,delayed release   Take 1 tablet every day by oral route.    blood sugar diagnostic Strp   Free style Lite Test strips, See Instructions, DX: E11.9 to test sugar daily, # 100 EA, 11 Refill(s)    calcium carbonate (OS-YRN) 600 mg calcium (1,500 mg) Tab Calcium 600 mg calcium (1,500 mg) tablet   Take 1 tablet every day by oral route.    EScitalopram oxalate (LEXAPRO) 20 mg, Oral    esomeprazole (NEXIUM) 40 mg, Oral, Daily    letrozole (FEMARA) 2.5 mg, Oral    levothyroxine (SYNTHROID) 88 MCG tablet TAKE 1 TABLET BY MOUTH EVERY DAY    losartan-hydrochlorothiazide 100-12.5 mg (HYZAAR) 100-12.5 mg Tab TAKE 1 TABLET BY MOUTH EVERY DAY    meloxicam (MOBIC) 7.5 MG tablet TAKE 1 TABLET BY MOUTH EVERY DAY    metoprolol succinate (TOPROL-XL) 100 MG 24 hr tablet TAKE 1 TABLET BY MOUTH EVERY DAY    omega 3-dha-epa-fish oil 1,000 mg (120 mg-180 mg) Cap Fish Oil 1,000 mg (120 mg-180 mg) capsule   Take 1 capsule every day by oral route.    rosuvastatin (CRESTOR) 10 MG tablet TAKE 1 TABLET BY MOUTH EVERY DAY IN EVENING    tiZANidine (ZANAFLEX) 4 MG tablet tizanidine 4 mg tablet     Review of patient's  allergies indicates:   Allergen Reactions    Ace inhibitors Swelling    Lisinopril     Metformin Diarrhea    Phenazopyridine     Nitrofurantoin monohyd/m-cryst Rash       Patient Care Team:  Mark Lawton II, MD as PCP - General (Internal Medicine)  Patricia Cohen PA as Physician Assistant (Physician Assistant)  Valentino, Vernon A., MD as Consulting Physician (Cardiology)        No follow-ups on file. In addition to their scheduled follow up, the patient has also been instructed to follow up on as needed basis.     Future Appointments   Date Time Provider Department Center   4/11/2024 11:00 AM [facility] CT1 [facility]   8/15/2024  1:30 PM Mark Lawton II, MD [facility] [id number] [email or web address]   9/24/2024  2:00 PM Rina Wan FNP [facility]   4/10/2025 10:30 AM CV [facility] VASCULAR SURGERY US 01 [facility] VAS[facility]OL[facility]   4/10/2025 11:00 AM [facility]

## 2024-04-09 ENCOUNTER — OFFICE VISIT (OUTPATIENT)
Dept: VASCULAR SURGERY | Facility: CLINIC | Age: 79
End: 2024-04-09
Payer: MEDICARE

## 2024-04-09 VITALS
HEIGHT: 61 IN | SYSTOLIC BLOOD PRESSURE: 165 MMHG | HEART RATE: 67 BPM | WEIGHT: 130 LBS | BODY MASS INDEX: 24.55 KG/M2 | DIASTOLIC BLOOD PRESSURE: 75 MMHG

## 2024-04-09 DIAGNOSIS — I65.23 BILATERAL CAROTID ARTERY STENOSIS: Primary | ICD-10-CM

## 2024-04-09 DIAGNOSIS — F17.200 SMOKER: ICD-10-CM

## 2024-04-09 DIAGNOSIS — I70.209 ATHEROSCLEROSIS OF ARTERIES OF EXTREMITIES: ICD-10-CM

## 2024-04-09 PROCEDURE — 99213 OFFICE O/P EST LOW 20 MIN: CPT | Mod: ,,, | Performed by: SPECIALIST

## 2024-04-30 DIAGNOSIS — C50.912 PRIMARY MALIGNANT NEOPLASM OF LEFT BREAST: ICD-10-CM

## 2024-04-30 RX ORDER — LETROZOLE 2.5 MG/1
2.5 TABLET, FILM COATED ORAL DAILY
Qty: 90 TABLET | Refills: 1 | Status: SHIPPED | OUTPATIENT
Start: 2024-04-30

## 2024-06-11 DIAGNOSIS — K21.9 GASTROESOPHAGEAL REFLUX DISEASE, UNSPECIFIED WHETHER ESOPHAGITIS PRESENT: ICD-10-CM

## 2024-06-11 RX ORDER — ESOMEPRAZOLE MAGNESIUM 40 MG/1
40 CAPSULE, DELAYED RELEASE ORAL DAILY
Qty: 90 CAPSULE | Refills: 3 | Status: SHIPPED | OUTPATIENT
Start: 2024-06-11 | End: 2025-06-11

## 2024-06-12 RX ORDER — PANTOPRAZOLE SODIUM 40 MG/1
40 TABLET, DELAYED RELEASE ORAL DAILY
Qty: 90 TABLET | Refills: 3 | Status: SHIPPED | OUTPATIENT
Start: 2024-06-12 | End: 2025-06-12

## 2024-07-30 ENCOUNTER — HOSPITAL ENCOUNTER (OUTPATIENT)
Dept: RADIOLOGY | Facility: HOSPITAL | Age: 79
Discharge: HOME | End: 2024-07-30
Attending: NURSE PRACTITIONER
Payer: MEDICARE

## 2024-07-30 DIAGNOSIS — Z12.31 ENCOUNTER FOR SCREENING MAMMOGRAM FOR BREAST CANCER: ICD-10-CM

## 2024-07-30 PROCEDURE — 77063 BREAST TOMOSYNTHESIS BI: CPT | Mod: TC,52

## 2024-07-30 PROCEDURE — 77063 BREAST TOMOSYNTHESIS BI: CPT | Mod: 26,52,, | Performed by: RADIOLOGY

## 2024-07-30 PROCEDURE — 77067 SCR MAMMO BI INCL CAD: CPT | Mod: TC,52

## 2024-07-30 PROCEDURE — 77067 SCR MAMMO BI INCL CAD: CPT | Mod: 26,52,, | Performed by: RADIOLOGY

## 2024-08-08 ENCOUNTER — TELEPHONE (OUTPATIENT)
Dept: INTERNAL MEDICINE | Facility: CLINIC | Age: 79
End: 2024-08-08
Payer: MEDICARE

## 2024-08-08 ENCOUNTER — LAB VISIT (OUTPATIENT)
Dept: LAB | Facility: HOSPITAL | Age: 79
End: 2024-08-08
Attending: INTERNAL MEDICINE
Payer: MEDICARE

## 2024-08-08 DIAGNOSIS — J43.9 PULMONARY EMPHYSEMA, UNSPECIFIED EMPHYSEMA TYPE: ICD-10-CM

## 2024-08-08 DIAGNOSIS — I10 ESSENTIAL HYPERTENSION: ICD-10-CM

## 2024-08-08 DIAGNOSIS — I73.9 PERIPHERAL VASCULAR DISEASE: ICD-10-CM

## 2024-08-08 DIAGNOSIS — E11.40 TYPE 2 DIABETES MELLITUS WITH DIABETIC NEUROPATHY, WITHOUT LONG-TERM CURRENT USE OF INSULIN: ICD-10-CM

## 2024-08-08 DIAGNOSIS — I70.209 ATHEROSCLEROSIS OF ARTERIES OF EXTREMITIES: ICD-10-CM

## 2024-08-08 DIAGNOSIS — C50.919 PRIMARY MALIGNANT NEOPLASM OF BREAST, UNSPECIFIED LATERALITY: ICD-10-CM

## 2024-08-08 DIAGNOSIS — E78.5 DYSLIPIDEMIA: ICD-10-CM

## 2024-08-08 DIAGNOSIS — L80 VITILIGO: ICD-10-CM

## 2024-08-08 DIAGNOSIS — E55.9 VITAMIN D DEFICIENCY: ICD-10-CM

## 2024-08-08 DIAGNOSIS — Z00.00 WELLNESS EXAMINATION: ICD-10-CM

## 2024-08-08 DIAGNOSIS — E03.8 OTHER SPECIFIED HYPOTHYROIDISM: ICD-10-CM

## 2024-08-08 LAB
ALBUMIN SERPL-MCNC: 3.9 G/DL (ref 3.4–4.8)
ALBUMIN/GLOB SERPL: 1.4 RATIO (ref 1.1–2)
ALP SERPL-CCNC: 62 UNIT/L (ref 40–150)
ALT SERPL-CCNC: 11 UNIT/L (ref 0–55)
ANION GAP SERPL CALC-SCNC: 7 MEQ/L
AST SERPL-CCNC: 22 UNIT/L (ref 5–34)
BACTERIA #/AREA URNS AUTO: NORMAL /HPF
BASOPHILS # BLD AUTO: 0.02 X10(3)/MCL
BASOPHILS NFR BLD AUTO: 0.4 %
BILIRUB SERPL-MCNC: 0.6 MG/DL
BILIRUB UR QL STRIP.AUTO: NEGATIVE
BUN SERPL-MCNC: 22.1 MG/DL (ref 9.8–20.1)
CALCIUM SERPL-MCNC: 9.5 MG/DL (ref 8.4–10.2)
CHLORIDE SERPL-SCNC: 101 MMOL/L (ref 98–107)
CHOLEST SERPL-MCNC: 173 MG/DL
CHOLEST/HDLC SERPL: 5 {RATIO} (ref 0–5)
CLARITY UR: CLEAR
CO2 SERPL-SCNC: 31 MMOL/L (ref 23–31)
COLOR UR AUTO: YELLOW
CREAT SERPL-MCNC: 0.79 MG/DL (ref 0.55–1.02)
CREAT UR-MCNC: 105.2 MG/DL (ref 45–106)
CREAT/UREA NIT SERPL: 28
EOSINOPHIL # BLD AUTO: 0.18 X10(3)/MCL (ref 0–0.9)
EOSINOPHIL NFR BLD AUTO: 3.4 %
ERYTHROCYTE [DISTWIDTH] IN BLOOD BY AUTOMATED COUNT: 11.8 % (ref 11.5–17)
EST. AVERAGE GLUCOSE BLD GHB EST-MCNC: 137 MG/DL
GFR SERPLBLD CREATININE-BSD FMLA CKD-EPI: >60 ML/MIN/1.73/M2
GLOBULIN SER-MCNC: 2.8 GM/DL (ref 2.4–3.5)
GLUCOSE SERPL-MCNC: 127 MG/DL (ref 82–115)
GLUCOSE UR QL STRIP: NEGATIVE
HBA1C MFR BLD: 6.4 %
HCT VFR BLD AUTO: 40.2 % (ref 37–47)
HDLC SERPL-MCNC: 36 MG/DL (ref 35–60)
HGB BLD-MCNC: 13.4 G/DL (ref 12–16)
HGB UR QL STRIP: NEGATIVE
IMM GRANULOCYTES # BLD AUTO: 0.01 X10(3)/MCL (ref 0–0.04)
IMM GRANULOCYTES NFR BLD AUTO: 0.2 %
KETONES UR QL STRIP: NEGATIVE
LDLC SERPL CALC-MCNC: 78 MG/DL (ref 50–140)
LEUKOCYTE ESTERASE UR QL STRIP: ABNORMAL
LYMPHOCYTES # BLD AUTO: 1.47 X10(3)/MCL (ref 0.6–4.6)
LYMPHOCYTES NFR BLD AUTO: 27.7 %
MCH RBC QN AUTO: 33.2 PG (ref 27–31)
MCHC RBC AUTO-ENTMCNC: 33.3 G/DL (ref 33–36)
MCV RBC AUTO: 99.5 FL (ref 80–94)
MICROALBUMIN UR-MCNC: 34.3 UG/ML
MICROALBUMIN/CREAT RATIO PNL UR: 32.6 MG/GM CR (ref 0–30)
MONOCYTES # BLD AUTO: 0.61 X10(3)/MCL (ref 0.1–1.3)
MONOCYTES NFR BLD AUTO: 11.5 %
NEUTROPHILS # BLD AUTO: 3.02 X10(3)/MCL (ref 2.1–9.2)
NEUTROPHILS NFR BLD AUTO: 56.8 %
NITRITE UR QL STRIP: NEGATIVE
PH UR STRIP: 7 [PH]
PLATELET # BLD AUTO: 138 X10(3)/MCL (ref 130–400)
PMV BLD AUTO: 9.6 FL (ref 7.4–10.4)
POTASSIUM SERPL-SCNC: 4.3 MMOL/L (ref 3.5–5.1)
PROT SERPL-MCNC: 6.7 GM/DL (ref 5.8–7.6)
PROT UR QL STRIP: NEGATIVE
RBC # BLD AUTO: 4.04 X10(6)/MCL (ref 4.2–5.4)
RBC #/AREA URNS AUTO: NORMAL /HPF
SODIUM SERPL-SCNC: 139 MMOL/L (ref 136–145)
SP GR UR STRIP.AUTO: 1.02 (ref 1–1.03)
SQUAMOUS #/AREA URNS AUTO: NORMAL /HPF
T4 FREE SERPL-MCNC: 1.15 NG/DL (ref 0.7–1.48)
TRIGL SERPL-MCNC: 294 MG/DL (ref 37–140)
TSH SERPL-ACNC: 0.96 UIU/ML (ref 0.35–4.94)
UROBILINOGEN UR STRIP-ACNC: 0.2
VLDLC SERPL CALC-MCNC: 59 MG/DL
WBC # BLD AUTO: 5.31 X10(3)/MCL (ref 4.5–11.5)
WBC #/AREA URNS AUTO: NORMAL /HPF

## 2024-08-08 PROCEDURE — 82043 UR ALBUMIN QUANTITATIVE: CPT

## 2024-08-08 PROCEDURE — 84443 ASSAY THYROID STIM HORMONE: CPT

## 2024-08-08 PROCEDURE — 84439 ASSAY OF FREE THYROXINE: CPT

## 2024-08-08 PROCEDURE — 83036 HEMOGLOBIN GLYCOSYLATED A1C: CPT

## 2024-08-08 PROCEDURE — 80061 LIPID PANEL: CPT

## 2024-08-08 PROCEDURE — 85025 COMPLETE CBC W/AUTO DIFF WBC: CPT

## 2024-08-08 PROCEDURE — 81003 URINALYSIS AUTO W/O SCOPE: CPT

## 2024-08-08 PROCEDURE — 36415 COLL VENOUS BLD VENIPUNCTURE: CPT

## 2024-08-08 PROCEDURE — 80053 COMPREHEN METABOLIC PANEL: CPT

## 2024-08-09 ENCOUNTER — TELEPHONE (OUTPATIENT)
Dept: INTERNAL MEDICINE | Facility: CLINIC | Age: 79
End: 2024-08-09
Payer: MEDICARE

## 2024-08-09 DIAGNOSIS — R39.89 URINE TROUBLES: Primary | ICD-10-CM

## 2024-08-09 RX ORDER — CIPROFLOXACIN 500 MG/1
500 TABLET ORAL 2 TIMES DAILY
Qty: 8 TABLET | Refills: 0 | Status: SHIPPED | OUTPATIENT
Start: 2024-08-09 | End: 2024-08-13

## 2024-08-14 PROBLEM — Z00.00 WELLNESS EXAMINATION: Status: ACTIVE | Noted: 2024-08-14

## 2024-08-14 NOTE — PROGRESS NOTES
"Subjective:       Patient ID: Marlin Herrera is a 79 y.o. female.      Patient Care Team:  Mark Lawton II, MD as PCP - General (Internal Medicine)  Patricia Cohen PA as Physician Assistant (Physician Assistant)  Valentino, Vernon A., MD as Consulting Physician (Cardiology)    Chief Complaint: Medicare AWV Follow Up, Diabetes, Hypertension, Peripheral Vascular Disease, Dyslipidemia, Vitamin D Deficiency, Hypothyroidism, and Insomnia    79-year-old female seen today for followup of diabetes, hypertension, hypothyroidism, and hyperlipidemia among other conditions.       Review of Systems   Constitutional:  Negative for fever.   HENT:  Negative for nosebleeds.    Eyes:  Negative for visual disturbance.   Respiratory:  Negative for shortness of breath.    Cardiovascular:  Negative for chest pain.   Gastrointestinal:  Negative for abdominal pain.   Genitourinary:  Negative for dysuria.   Musculoskeletal:  Negative for gait problem.   Neurological:  Negative for headaches.           Patient Reported Health Risk Assessment         Objective:      Physical Exam  HENT:      Head: Normocephalic.      Mouth/Throat:      Pharynx: Oropharynx is clear.   Eyes:      Extraocular Movements: Extraocular movements intact.   Cardiovascular:      Rate and Rhythm: Normal rate.   Pulmonary:      Breath sounds: Normal breath sounds.   Abdominal:      Palpations: Abdomen is soft.   Musculoskeletal:         General: No swelling.   Skin:     General: Skin is warm.   Neurological:      General: No focal deficit present.      Mental Status: She is alert and oriented to person, place, and time.   Psychiatric:         Mood and Affect: Mood normal.         Vitals:    08/15/24 1316   BP: 122/70   Pulse: 66   Resp: 16   Temp: 98 °F (36.7 °C)   SpO2: 95%   Weight: 60.8 kg (134 lb)   Height: 5' 1" (1.549 m)                   No data to display                  8/15/2024     1:30 PM 4/9/2024    10:40 AM 3/19/2024     2:00 PM 2/15/2024     " 2:00 PM 9/7/2023     2:00 PM 8/10/2023     1:30 PM 2/7/2023     1:00 PM   Fall Risk Assessment - Outpatient   Mobility Status Ambulatory Ambulatory Ambulatory Ambulatory Ambulatory Ambulatory Ambulatory   Number of falls 0 1 1 1 with injury 0 0 0   Identified as fall risk False False False True False False False                  Assessment:       Problem List Items Addressed This Visit          Pulmonary    Pulmonary emphysema, unspecified emphysema type - Primary    Relevant Orders    CBC Auto Differential    Comprehensive Metabolic Panel    Lipid Panel    Microalbumin/Creatinine Ratio, Urine    Urinalysis, Reflex to Urine Culture    T4, Free    TSH    Hemoglobin A1C    Vitamin D       Cardiac/Vascular    Atherosclerosis of arteries of extremities    Relevant Orders    CBC Auto Differential    Comprehensive Metabolic Panel    Lipid Panel    Microalbumin/Creatinine Ratio, Urine    Urinalysis, Reflex to Urine Culture    T4, Free    TSH    Hemoglobin A1C    Vitamin D    Essential hypertension    Relevant Orders    CBC Auto Differential    Comprehensive Metabolic Panel    Lipid Panel    Microalbumin/Creatinine Ratio, Urine    Urinalysis, Reflex to Urine Culture    T4, Free    TSH    Hemoglobin A1C    Vitamin D    Peripheral vascular disease    Relevant Orders    CBC Auto Differential    Comprehensive Metabolic Panel    Lipid Panel    Microalbumin/Creatinine Ratio, Urine    Urinalysis, Reflex to Urine Culture    T4, Free    TSH    Hemoglobin A1C    Vitamin D    Dyslipidemia    Relevant Orders    CBC Auto Differential    Comprehensive Metabolic Panel    Lipid Panel    Microalbumin/Creatinine Ratio, Urine    Urinalysis, Reflex to Urine Culture    T4, Free    TSH    Hemoglobin A1C    Vitamin D       Oncology    Primary malignant neoplasm of breast    Relevant Orders    CBC Auto Differential    Comprehensive Metabolic Panel    Lipid Panel    Microalbumin/Creatinine Ratio, Urine    Urinalysis, Reflex to Urine Culture    T4,  Free    TSH    Hemoglobin A1C    Vitamin D       Endocrine    Type 2 diabetes mellitus with diabetic neuropathy, without long-term current use of insulin    Relevant Orders    CBC Auto Differential    Comprehensive Metabolic Panel    Lipid Panel    Microalbumin/Creatinine Ratio, Urine    Urinalysis, Reflex to Urine Culture    T4, Free    TSH    Hemoglobin A1C    Vitamin D    Vitamin D deficiency    Relevant Orders    CBC Auto Differential    Comprehensive Metabolic Panel    Lipid Panel    Microalbumin/Creatinine Ratio, Urine    Urinalysis, Reflex to Urine Culture    T4, Free    TSH    Hemoglobin A1C    Vitamin D    Hypothyroidism    Relevant Orders    CBC Auto Differential    Comprehensive Metabolic Panel    Lipid Panel    Microalbumin/Creatinine Ratio, Urine    Urinalysis, Reflex to Urine Culture    T4, Free    TSH    Hemoglobin A1C    Vitamin D       Other    Wellness examination    Relevant Orders    CBC Auto Differential    Comprehensive Metabolic Panel    Lipid Panel    Microalbumin/Creatinine Ratio, Urine    Urinalysis, Reflex to Urine Culture    T4, Free    TSH    Hemoglobin A1C    Vitamin D       Medication List with Changes/Refills   New Medications    BUSPIRONE (BUSPAR) 5 MG TAB    Take 1 tablet (5 mg total) by mouth 2 (two) times daily as needed (Anxiety).   Current Medications    AMITRIPTYLINE (ELAVIL) 25 MG TABLET    Take 1 tablet (25 mg total) by mouth every evening.    AMLODIPINE (NORVASC) 5 MG TABLET    TAKE 1 TABLET BY MOUTH TWICE A DAY    ASPIRIN (ECOTRIN) 81 MG EC TABLET    aspirin 81 mg tablet,delayed release   Take 1 tablet every day by oral route.    BLOOD SUGAR DIAGNOSTIC STRP      Free style Lite Test strips, See Instructions, DX: E11.9 to test sugar daily, # 100 EA, 11 Refill(s)    CALCIUM CARBONATE (OS-YRN) 600 MG CALCIUM (1,500 MG) TAB    Calcium 600 mg calcium (1,500 mg) tablet   Take 1 tablet every day by oral route.    ESCITALOPRAM OXALATE (LEXAPRO) 20 MG TABLET    TAKE 1 TABLET BY  MOUTH EVERY DAY    LETROZOLE (FEMARA) 2.5 MG TAB    Take 1 tablet (2.5 mg total) by mouth once daily.    LEVOTHYROXINE (SYNTHROID) 88 MCG TABLET    TAKE 1 TABLET BY MOUTH EVERY DAY    LOSARTAN-HYDROCHLOROTHIAZIDE 100-12.5 MG (HYZAAR) 100-12.5 MG TAB    TAKE 1 TABLET BY MOUTH EVERY DAY    MELOXICAM (MOBIC) 7.5 MG TABLET    TAKE 1 TABLET BY MOUTH EVERY DAY    METOPROLOL SUCCINATE (TOPROL-XL) 100 MG 24 HR TABLET    TAKE 1 TABLET BY MOUTH EVERY DAY    OMEGA 3-DHA-EPA-FISH OIL 1,000 MG (120 MG-180 MG) CAP    Fish Oil 1,000 mg (120 mg-180 mg) capsule   Take 1 capsule every day by oral route.    PANTOPRAZOLE (PROTONIX) 40 MG TABLET    Take 1 tablet (40 mg total) by mouth once daily.    ROSUVASTATIN (CRESTOR) 10 MG TABLET    TAKE 1 TABLET BY MOUTH EVERY DAY IN EVENING   Discontinued Medications    CIPROFLOXACIN HCL (CIPRO) 500 MG TABLET    Take 1 tablet (500 mg total) by mouth 2 (two) times daily. for 4 days    ESOMEPRAZOLE (NEXIUM) 40 MG CAPSULE    Take 1 capsule (40 mg total) by mouth once daily.    TIZANIDINE (ZANAFLEX) 4 MG TABLET    tizanidine 4 mg tablet        Plan:       1. Diabetes: Hemoglobin A1c is 6.4. Metformin was stopped because of diarrhea or dyspepsia. She is no longer on medication, yet her glucose is very well controlled     2. Hypertension: Stable     3. Hyperlipidemia: LDL at goal. Calcium score 1.3 in 2018     4. Cervical spinal stenosis: She had surgery in 2013. Continue meloxicam. She saw pain management once (Dr. Smallwood assistant) in 2022 and they suggested Toradol injection PRN.     5. Murguia's esophagus: EGD in 5/2021 with Dr. Felton. Referred at last visit because of dysphagia     6. Gastroesophageal reflux disease: Restart Nexium     7. Hypothyroidism: TSH normal     8. Insomnia: She has worsening insomnia. Decreased amitriptyline back to 25 mg because of drowsiness      9. Vitamin D deficiency: Continue vitamin D     10. Osteoarthritis: L knee pain worsening. Referred to Ortho previously,  considering knee replacement     11. Urinary tract infection: Treated with Cipro in      12. Tobacco use: She smokes half a pack per day. Lungs screening CT negative in 2024.  Smoking cessation encouraged today (4 minutes)     13. Stage IA left breast cancer: Diagnosed in 2019 status post mastectomy. Continue Femara.     14. Peripheral vascular disease: CT angiogram of the lower extremities showed diffuse disease. She has claudication and pain in her lower back and right leg. She will F/U with Dr. Steinberg and Dr. Valentino. Continue aspirin 81mg daily     15. Vitiligo: Her brother has vitiligo as well    16. COPD:  Seen on imaging of the chest, smoking cessation encouraged.  Not on inhalers    17. Anxiety: Continue Lexapro.  Add buspirone twice a day as needed     18. Wellness: Colonoscopy normal in 2020. MMG/ultrasound 2024          Her partner, Mr. Madison, had metastatic lung cancer and  in 2019      Medicare Annual Wellness and Personalized Prevention Plan:   Fall Risk + Home Safety + Hearing Impairment + Depression Screen + Cognitive Impairment Screen + Health Risk Assessment all reviewed    Health Maintenance Topics with due status: Not Due       Topic Last Completion Date    DEXA Scan 2023    Influenza Vaccine 10/03/2023    TETANUS VACCINE 2024    Aspirin/Antiplatelet Therapy 2024    Diabetes Urine Screening 2024    Lipid Panel 2024    Hemoglobin A1c 2024      The patient's Health Maintenance was reviewed and the following appears to be due at this time:   Health Maintenance Due   Topic Date Due    COVID-19 Vaccine ( season) 2024    Eye Exam  2024       Advance Care Planning   I attest to discussing Advance Care Planning with patient and/or family member.  Education was provided including the importance of the Health Care Power of , Advance Directives, and/or [place] documentation.  The patient expressed understanding to the  importance of this information and discussion.  Length of ACP conversation in minutes: 1    Advance Care Planning     Date: 08/14/2024  Patient did not wish or was not able to name a surrogate decision maker or provide an Advance Care Plan.           Opioid Screening: Patient medication list reviewed, patient is not taking prescription opioids. Patient is not using additional opioids than prescribed. Patient is at low risk of substance abuse based on this opioid use history.     No follow-ups on file. In addition to their scheduled follow up, the patient has also been instructed to follow up on as needed basis.

## 2024-08-15 ENCOUNTER — OFFICE VISIT (OUTPATIENT)
Dept: INTERNAL MEDICINE | Facility: CLINIC | Age: 79
End: 2024-08-15
Payer: MEDICARE

## 2024-08-15 VITALS
HEIGHT: 61 IN | WEIGHT: 134 LBS | BODY MASS INDEX: 25.3 KG/M2 | DIASTOLIC BLOOD PRESSURE: 70 MMHG | RESPIRATION RATE: 16 BRPM | SYSTOLIC BLOOD PRESSURE: 122 MMHG | HEART RATE: 66 BPM | TEMPERATURE: 98 F | OXYGEN SATURATION: 95 %

## 2024-08-15 DIAGNOSIS — J43.9 PULMONARY EMPHYSEMA, UNSPECIFIED EMPHYSEMA TYPE: Primary | ICD-10-CM

## 2024-08-15 DIAGNOSIS — E78.5 DYSLIPIDEMIA: ICD-10-CM

## 2024-08-15 DIAGNOSIS — C50.919 PRIMARY MALIGNANT NEOPLASM OF BREAST, UNSPECIFIED LATERALITY: ICD-10-CM

## 2024-08-15 DIAGNOSIS — I70.209 ATHEROSCLEROSIS OF ARTERIES OF EXTREMITIES: ICD-10-CM

## 2024-08-15 DIAGNOSIS — I73.9 PERIPHERAL VASCULAR DISEASE: ICD-10-CM

## 2024-08-15 DIAGNOSIS — E11.40 TYPE 2 DIABETES MELLITUS WITH DIABETIC NEUROPATHY, WITHOUT LONG-TERM CURRENT USE OF INSULIN: ICD-10-CM

## 2024-08-15 DIAGNOSIS — E55.9 VITAMIN D DEFICIENCY: ICD-10-CM

## 2024-08-15 DIAGNOSIS — E03.8 OTHER SPECIFIED HYPOTHYROIDISM: ICD-10-CM

## 2024-08-15 DIAGNOSIS — Z00.00 WELLNESS EXAMINATION: ICD-10-CM

## 2024-08-15 DIAGNOSIS — I10 ESSENTIAL HYPERTENSION: ICD-10-CM

## 2024-08-15 RX ORDER — BUSPIRONE HYDROCHLORIDE 5 MG/1
5 TABLET ORAL 2 TIMES DAILY PRN
Qty: 60 TABLET | Refills: 3 | Status: SHIPPED | OUTPATIENT
Start: 2024-08-15 | End: 2025-08-15

## 2024-08-15 NOTE — LETTER
AUTHORIZATION FOR RELEASE OF   CONFIDENTIAL INFORMATION    Dear Dr. Biswas,    We are seeing Marlin Herrera, date of birth 1945, in the clinic at [address]. Mark Lawton II, MD is the patient's PCP. Marlin Herrera has an outstanding lab/procedure at the time we reviewed her chart. In order to help keep her health information updated, she has authorized us to request the following medical record(s):        (  )  MAMMOGRAM                                      (  )  COLONOSCOPY      (  )  PAP SMEAR                                          (  )  OUTSIDE LAB RESULTS     (  )  DEXA SCAN                                          ( X )  EYE EXAM            (  )  FOOT EXAM                                          (  )  ENTIRE RECORD     (  )  OUTSIDE IMMUNIZATIONS                 (  )  _______________         Please fax records to [facility], Voitier, Thomas L II, MD. [phone]     If you have any questions, please contact [phone]  .           Patient Name: Marlin Herrera  : 1945  Patient Phone #: [phone]

## 2024-08-16 ENCOUNTER — DOCUMENTATION ONLY (OUTPATIENT)
Dept: INTERNAL MEDICINE | Facility: CLINIC | Age: 79
End: 2024-08-16
Payer: MEDICARE

## 2024-08-16 DIAGNOSIS — Z00.00 WELLNESS EXAMINATION: ICD-10-CM

## 2024-08-16 RX ORDER — LEVOTHYROXINE SODIUM 88 UG/1
88 TABLET ORAL DAILY
Qty: 90 TABLET | Refills: 3 | Status: SHIPPED | OUTPATIENT
Start: 2024-08-16 | End: 2025-08-16

## 2024-09-05 DIAGNOSIS — Z00.00 WELLNESS EXAMINATION: ICD-10-CM

## 2024-09-05 RX ORDER — ESCITALOPRAM OXALATE 20 MG/1
20 TABLET ORAL DAILY
Qty: 90 TABLET | Refills: 3 | Status: SHIPPED | OUTPATIENT
Start: 2024-09-05 | End: 2025-09-05

## 2024-09-05 RX ORDER — MELOXICAM 7.5 MG/1
7.5 TABLET ORAL DAILY
Qty: 90 TABLET | Refills: 3 | Status: SHIPPED | OUTPATIENT
Start: 2024-09-05 | End: 2025-09-05

## 2024-09-27 NOTE — PROGRESS NOTES
Subjective:       Patient ID: Marlin Herrera is a 79 y.o. female.    Surgeon: Dr. Emelina Leonard    Left Breast Cancer Stage IA ([id number]) Diagnosed 5/22/19  Biopsy/pathology: Left breast 6:00 periareolar lesion biopsy done 5/22/19--invasive ductal carcinoma Grade 2, DCIS Grade 2, proliferative fibrocystic changes with multifocal atypical lobular hyperplasia, ER 99.58%, NH 47.36%, Her2 Equivocal by IHC 2+, negative by FISH.  Surgery/pathology: Left breast mastectomy done 7/8/19--infiltrating ductal carcinoma, NOS type, Grade 2, unifocal measures 1cm in greatest dimension, associated foci of DCIS grade 2, 8mm in greatest extent, margins free, no vascular invasion, 5 sentinel and 1 axillary lymph node all negative (T1bN0).  Imaging:  Screening MMG 4/17/19--developing focal asymmetry with calcifications in left breast at 6:00, needs additional imaging BIRADS 0.  Left breast Reji diagnostic MMG/US done 5/14/19--Left breast 6:00 periareolar position mass measures 0.9X1.6X0.6cm with associated calcifications, suspicious BIRADS 4.  CT chest LD screening done 3/25/2021--Stable tiny left lower lobe nodule, few punctate nodules not clearly seen on prior, for example 3 mm right upper lobe nodule, right upper lobe 2 mm nodule anteriorly, benign, recommend repeat in 12 months.    DEXA:  9/26/19--AP Spine T= -0.2, Femur neck left 0.1, right 0.2, Femur total left 0.6, right 0.0 Normal bone density.  9/30/21--AP Spine T= -0.4, Femur neck left 0.1, right 0.4, Femur total left 0.2, right -0.3, normal bone density, mixed findings.  8/31/23--AP spine -0.4, left femur neck 0.1, left total 0.1, right femur neck 0.2, right total -0.4, left forearm -1.4.     Treatment plan: Femara X 5-10 years started 8/15/19.       Chief Complaint: Other Misc (Pt reports no concerns today.)    HPI   Patient presents for follow-up of breast cancer. She continues on the Femara without any problems. No new complaints reported. She is still smoking but she  has cut back. LDCT in April with benign findings. She does have claudication and is being followed by vascular surgery. Recent MMG in July was benign as well. No other problems reported.     Past Medical History:   Diagnosis Date    Atherosclerosis of arteries of extremities     Breast cancer     HTN (hypertension)       Review of patient's allergies indicates:   Allergen Reactions    Ace inhibitors Swelling    Lisinopril     Metformin Diarrhea    Phenazopyridine     Nitrofurantoin monohyd/m-cryst Rash        Current Outpatient Medications:     amitriptyline (ELAVIL) 25 MG tablet, Take 1 tablet (25 mg total) by mouth every evening., Disp: 90 tablet, Rfl: 3    amLODIPine (NORVASC) 5 MG tablet, TAKE 1 TABLET BY MOUTH TWICE A DAY, Disp: 180 tablet, Rfl: 5    aspirin (ECOTRIN) 81 MG EC tablet, aspirin 81 mg tablet,delayed release  Take 1 tablet every day by oral route., Disp: , Rfl:     blood sugar diagnostic Strp,  Free style Lite Test strips, See Instructions, DX: E11.9 to test sugar daily, # 100 EA, 11 Refill(s), Disp: , Rfl:     calcium carbonate (OS-YRN) 600 mg calcium (1,500 mg) Tab, Calcium 600 mg calcium (1,500 mg) tablet  Take 1 tablet every day by oral route., Disp: , Rfl:     EScitalopram oxalate (LEXAPRO) 20 MG tablet, Take 1 tablet (20 mg total) by mouth once daily., Disp: 90 tablet, Rfl: 3    letrozole (FEMARA) 2.5 mg Tab, Take 1 tablet (2.5 mg total) by mouth once daily., Disp: 90 tablet, Rfl: 1    levothyroxine (SYNTHROID) 88 MCG tablet, Take 1 tablet (88 mcg total) by mouth once daily., Disp: 90 tablet, Rfl: 3    losartan-hydrochlorothiazide 100-12.5 mg (HYZAAR) 100-12.5 mg Tab, TAKE 1 TABLET BY MOUTH EVERY DAY, Disp: 90 tablet, Rfl: 4    meloxicam (MOBIC) 7.5 MG tablet, Take 1 tablet (7.5 mg total) by mouth once daily., Disp: 90 tablet, Rfl: 3    metoprolol succinate (TOPROL-XL) 100 MG 24 hr tablet, TAKE 1 TABLET BY MOUTH EVERY DAY, Disp: 90 tablet, Rfl: 3    omega 3-dha-epa-fish oil 1,000 mg (120  mg-180 mg) Cap, Fish Oil 1,000 mg (120 mg-180 mg) capsule  Take 1 capsule every day by oral route., Disp: , Rfl:     pantoprazole (PROTONIX) 40 MG tablet, Take 1 tablet (40 mg total) by mouth once daily., Disp: 90 tablet, Rfl: 3    rosuvastatin (CRESTOR) 10 MG tablet, TAKE 1 TABLET BY MOUTH EVERY DAY IN EVENING, Disp: 90 tablet, Rfl: 4    busPIRone (BUSPAR) 5 MG Tab, Take 1 tablet (5 mg total) by mouth 2 (two) times daily as needed (Anxiety). (Patient not taking: Reported on 10/3/2024), Disp: 60 tablet, Rfl: 3    Review of Systems   Constitutional:  Negative for activity change, fever and unexpected weight change.   Eyes:  Negative for visual disturbance.   Respiratory:  Negative for cough and shortness of breath.    Cardiovascular:  Negative for chest pain.        PVD with claudication pain   Gastrointestinal:  Negative for abdominal pain, blood in stool, constipation, diarrhea, nausea and vomiting.   Genitourinary:  Negative for difficulty urinating.   Musculoskeletal:  Positive for neck pain and neck stiffness. Negative for back pain.   Integumentary:  Negative for rash.   Neurological:  Negative for dizziness, weakness and headaches.   Psychiatric/Behavioral:  Negative for behavioral problems and suicidal ideas.          Vitals:    10/03/24 0956   BP: (!) 138/55   Pulse: 64   Resp: 14   Temp: 97.9 °F (36.6 °C)     Physical Exam  Vitals reviewed.   Constitutional:       Appearance: Normal appearance. She is normal weight.   HENT:      Head: Normocephalic.   Eyes:      General: Lids are normal. Vision grossly intact.      Extraocular Movements: Extraocular movements intact.      Conjunctiva/sclera: Conjunctivae normal.   Neck:      Comments: Neck pain and limited ROM  Cardiovascular:      Rate and Rhythm: Normal rate and regular rhythm.      Pulses: Normal pulses.      Heart sounds: Normal heart sounds, S1 normal and S2 normal.   Pulmonary:      Effort: Pulmonary effort is normal.      Breath sounds: Normal breath  sounds.   Chest:   Breasts:     Left: Absent.      Comments: Left chest wall s/p mastectomy. Right breast normal. No palpable axillary adenopathy bilaterally  Abdominal:      General: Abdomen is protuberant. Bowel sounds are normal.      Palpations: Abdomen is soft.   Musculoskeletal:      Cervical back: Rigidity present. Pain with movement present.   Feet:      Comments: Evidence of chronic venous insufficiency noted to bilateral lower extremities.  Skin:     General: Skin is warm.      Capillary Refill: Capillary refill takes less than 2 seconds.   Neurological:      Mental Status: She is alert.   Psychiatric:         Attention and Perception: Attention normal.         Mood and Affect: Mood normal.         Speech: Speech normal.         Behavior: Behavior normal. Behavior is cooperative.         Cognition and Memory: Cognition normal.         Judgment: Judgment normal.       Documentation Only on 08/16/2024   Component Date Value    Left Eye DM Retinopathy 11/09/2023 Negative     Right Eye DM Retinopathy 11/09/2023 Negative    Lab Visit on 08/08/2024   Component Date Value    Sodium 08/08/2024 139     Potassium 08/08/2024 4.3     Chloride 08/08/2024 101     CO2 08/08/2024 31     Glucose 08/08/2024 127 (H)     Blood Urea Nitrogen 08/08/2024 22.1 (H)     Creatinine 08/08/2024 0.79     Calcium 08/08/2024 9.5     Protein Total 08/08/2024 6.7     Albumin 08/08/2024 3.9     Globulin 08/08/2024 2.8     Albumin/Globulin Ratio 08/08/2024 1.4     Bilirubin Total 08/08/2024 0.6     ALP 08/08/2024 62     ALT 08/08/2024 11     AST 08/08/2024 22     eGFR 08/08/2024 >60     Anion Gap 08/08/2024 7.0     BUN/Creatinine Ratio 08/08/2024 28     Cholesterol Total 08/08/2024 173     HDL Cholesterol 08/08/2024 36     Triglyceride 08/08/2024 294 (H)     Cholesterol/HDL Ratio 08/08/2024 5     Very Low Density Lipopro* 08/08/2024 59     LDL Cholesterol 08/08/2024 78.00     Urine Microalbumin 08/08/2024 34.3 (H)     Urine Creatinine  08/08/2024 105.2     Microalbumin Creatinine * 08/08/2024 32.6 (H)     Color, UA 08/08/2024 Yellow     Appearance, UA 08/08/2024 Clear     Specific Gravity, UA 08/08/2024 1.020     pH, UA 08/08/2024 7.0     Protein, UA 08/08/2024 Negative     Glucose, UA 08/08/2024 Negative     Ketones, UA 08/08/2024 Negative     Blood, UA 08/08/2024 Negative     Bilirubin, UA 08/08/2024 Negative     Urobilinogen, UA 08/08/2024 0.2     Nitrites, UA 08/08/2024 Negative     Leukocyte Esterase, UA 08/08/2024 Small (A)     Thyroxine Free 08/08/2024 1.15     TSH 08/08/2024 0.956     Hemoglobin A1c 08/08/2024 6.4     Estimated Average Glucose 08/08/2024 137.0     WBC 08/08/2024 5.31     RBC 08/08/2024 4.04 (L)     Hgb 08/08/2024 13.4     Hct 08/08/2024 40.2     MCV 08/08/2024 99.5 (H)     MCH 08/08/2024 33.2 (H)     MCHC 08/08/2024 33.3     RDW 08/08/2024 11.8     Platelet 08/08/2024 138     MPV 08/08/2024 9.6     Neut % 08/08/2024 56.8     Lymph % 08/08/2024 27.7     Mono % 08/08/2024 11.5     Eos % 08/08/2024 3.4     Basophil % 08/08/2024 0.4     Lymph # 08/08/2024 1.47     Neut # 08/08/2024 3.02     Mono # 08/08/2024 0.61     Eos # 08/08/2024 0.18     Baso # 08/08/2024 0.02     IG# 08/08/2024 0.01     IG% 08/08/2024 0.2     Bacteria, UA 08/08/2024 None Seen     RBC, UA 08/08/2024 None Seen     WBC, UA 08/08/2024 0-5     Squamous Epithelial Cell* 08/08/2024 Rare       ECOG SCORE    2 - Capable of all selfcare but unable to carry out any work activities, active > 50% of hours       No visits with results within 1 Week(s) from this visit.   Latest known visit with results is:   Documentation Only on 08/16/2024   Component Date Value    Left Eye DM Retinopathy 11/09/2023 Negative     Right Eye DM Retinopathy 11/09/2023 Negative           Assessment:       Problem List Items Addressed This Visit          Oncology    Primary malignant neoplasm of breast - Primary    Relevant Orders    Mammo Digital Screening Right with Reji    Long term  (current) use of aromatase inhibitors    Relevant Orders    DXA Bone Density Axial Skeleton 1 or more sites       Orthopedic    Disorder of bone density and structure, unspecified    Relevant Orders    DXA Bone Density Axial Skeleton 1 or more sites     Other Visit Diagnoses       Encounter for screening mammogram for breast cancer        Relevant Orders    Mammo Digital Screening Right with Reji    Cigarette nicotine dependence with other nicotine-induced disorder        Relevant Orders    CT Chest Lung Screening Low Dose              Plan:       Patient with stage IA left breast cancer s/p mastectomy done 7/8/19. Tumor measured 1cm, Grade 2, ER+ and MN+ and Her2 negative, node negative.  Per NCCN guidelines, can consider Oncotype DX testing.  However, due to patient's age and small size of tumor, chemotherapy felt to be of limited benefit.   Recommended treatment with AI X 5-10 years.    Currently patient is doing well without any signs or symptoms to suggest disease recurrence.  Patient started Femara on 8/15/19 and is tolerating well.   Recent labs with PCP good in August 2024.   Continue Femara.  Continue Calcium and vitamin D.  DEXA in August 2023 with normal bone density. Her left forearm was measured this time for some reason and it was -1.4. Since it was not measured in the past, not sure if this is stable,improved or worse. Since the remaining areas were good, will continue to monitor. Will send orders for repeat DEXA in August 2025.    Encouraged her to continue Vitamin D3 and calcium daily.   Right screening MMG on 7/30/24 benign findings with recommendations for right breast screening in 1 year. Will send orders today.   LDCT scan of chest for lung cancer surveillance done on 4/11/24 was Category 1, benign. Plan to repeat in 1 year.   She is now 5 years out from original diagnosis. We can change surveillance visits to annual. No need to repeat labs here since her PCP has ordered.   Smoking cessation  encouraged.    All questions answered at this time.      ALBERTO Bobby

## 2024-10-03 ENCOUNTER — OFFICE VISIT (OUTPATIENT)
Dept: HEMATOLOGY/ONCOLOGY | Facility: CLINIC | Age: 79
End: 2024-10-03
Payer: MEDICARE

## 2024-10-03 VITALS
HEART RATE: 64 BPM | DIASTOLIC BLOOD PRESSURE: 55 MMHG | BODY MASS INDEX: 25.18 KG/M2 | WEIGHT: 133.38 LBS | HEIGHT: 61 IN | TEMPERATURE: 98 F | OXYGEN SATURATION: 96 % | RESPIRATION RATE: 14 BRPM | SYSTOLIC BLOOD PRESSURE: 138 MMHG

## 2024-10-03 DIAGNOSIS — F17.218 CIGARETTE NICOTINE DEPENDENCE WITH OTHER NICOTINE-INDUCED DISORDER: ICD-10-CM

## 2024-10-03 DIAGNOSIS — I10 ESSENTIAL HYPERTENSION: ICD-10-CM

## 2024-10-03 DIAGNOSIS — Z12.31 ENCOUNTER FOR SCREENING MAMMOGRAM FOR BREAST CANCER: ICD-10-CM

## 2024-10-03 DIAGNOSIS — C50.912 PRIMARY MALIGNANT NEOPLASM OF LEFT BREAST: Primary | ICD-10-CM

## 2024-10-03 DIAGNOSIS — M85.9 DISORDER OF BONE DENSITY AND STRUCTURE, UNSPECIFIED: ICD-10-CM

## 2024-10-03 DIAGNOSIS — Z79.811 LONG TERM (CURRENT) USE OF AROMATASE INHIBITORS: ICD-10-CM

## 2024-10-03 PROCEDURE — 99999 PR PBB SHADOW E&M-EST. PATIENT-LVL V: CPT | Mod: PBBFAC,,, | Performed by: NURSE PRACTITIONER

## 2024-10-03 PROCEDURE — 99215 OFFICE O/P EST HI 40 MIN: CPT | Mod: PBBFAC | Performed by: NURSE PRACTITIONER

## 2024-10-03 RX ORDER — LOSARTAN POTASSIUM AND HYDROCHLOROTHIAZIDE 12.5; 1 MG/1; MG/1
1 TABLET ORAL DAILY
Qty: 90 TABLET | Refills: 3 | Status: SHIPPED | OUTPATIENT
Start: 2024-10-03 | End: 2025-10-03

## 2024-10-31 DIAGNOSIS — C50.912 PRIMARY MALIGNANT NEOPLASM OF LEFT BREAST: ICD-10-CM

## 2024-10-31 RX ORDER — LETROZOLE 2.5 MG/1
2.5 TABLET, FILM COATED ORAL
Qty: 90 TABLET | Refills: 1 | Status: SHIPPED | OUTPATIENT
Start: 2024-10-31

## 2024-11-01 DIAGNOSIS — Z00.00 WELLNESS EXAMINATION: ICD-10-CM

## 2024-11-01 RX ORDER — METOPROLOL SUCCINATE 100 MG/1
100 TABLET, EXTENDED RELEASE ORAL DAILY
Qty: 90 TABLET | Refills: 3 | Status: SHIPPED | OUTPATIENT
Start: 2024-11-01 | End: 2025-11-01

## 2024-11-12 ENCOUNTER — DOCUMENTATION ONLY (OUTPATIENT)
Dept: INTERNAL MEDICINE | Facility: CLINIC | Age: 79
End: 2024-11-12
Payer: MEDICARE

## 2024-11-12 LAB
LEFT EYE DM RETINOPATHY: NEGATIVE
RIGHT EYE DM RETINOPATHY: NEGATIVE

## 2024-11-21 DIAGNOSIS — C50.912 PRIMARY MALIGNANT NEOPLASM OF LEFT BREAST: ICD-10-CM

## 2024-11-21 RX ORDER — LETROZOLE 2.5 MG/1
2.5 TABLET, FILM COATED ORAL DAILY
Qty: 90 TABLET | Refills: 3 | Status: SHIPPED | OUTPATIENT
Start: 2024-11-21

## 2024-12-26 DIAGNOSIS — I10 ESSENTIAL HYPERTENSION: ICD-10-CM

## 2024-12-26 RX ORDER — AMLODIPINE BESYLATE 5 MG/1
5 TABLET ORAL 2 TIMES DAILY
Qty: 180 TABLET | Refills: 5 | Status: SHIPPED | OUTPATIENT
Start: 2024-12-26 | End: 2025-12-26

## 2025-01-16 DIAGNOSIS — Z00.00 WELLNESS EXAMINATION: ICD-10-CM

## 2025-01-16 RX ORDER — AMITRIPTYLINE HYDROCHLORIDE 25 MG/1
25 TABLET, FILM COATED ORAL NIGHTLY
Qty: 90 TABLET | Refills: 3 | Status: SHIPPED | OUTPATIENT
Start: 2025-01-16

## 2025-01-29 DIAGNOSIS — E78.5 DYSLIPIDEMIA: ICD-10-CM

## 2025-01-29 RX ORDER — ROSUVASTATIN CALCIUM 10 MG/1
10 TABLET, COATED ORAL NIGHTLY
Qty: 90 TABLET | Refills: 4 | Status: SHIPPED | OUTPATIENT
Start: 2025-01-29 | End: 2026-01-29

## 2025-02-11 ENCOUNTER — TELEPHONE (OUTPATIENT)
Dept: INTERNAL MEDICINE | Facility: CLINIC | Age: 80
End: 2025-02-11
Payer: MEDICARE

## 2025-02-13 ENCOUNTER — LAB VISIT (OUTPATIENT)
Dept: LAB | Facility: HOSPITAL | Age: 80
End: 2025-02-13
Attending: INTERNAL MEDICINE
Payer: MEDICARE

## 2025-02-13 DIAGNOSIS — I10 ESSENTIAL HYPERTENSION: ICD-10-CM

## 2025-02-13 DIAGNOSIS — E55.9 VITAMIN D DEFICIENCY: ICD-10-CM

## 2025-02-13 DIAGNOSIS — Z00.00 WELLNESS EXAMINATION: ICD-10-CM

## 2025-02-13 DIAGNOSIS — I73.9 PERIPHERAL VASCULAR DISEASE: ICD-10-CM

## 2025-02-13 DIAGNOSIS — E78.5 DYSLIPIDEMIA: ICD-10-CM

## 2025-02-13 DIAGNOSIS — E03.8 OTHER SPECIFIED HYPOTHYROIDISM: ICD-10-CM

## 2025-02-13 DIAGNOSIS — I70.209 ATHEROSCLEROSIS OF ARTERIES OF EXTREMITIES: ICD-10-CM

## 2025-02-13 DIAGNOSIS — J43.9 PULMONARY EMPHYSEMA, UNSPECIFIED EMPHYSEMA TYPE: ICD-10-CM

## 2025-02-13 DIAGNOSIS — E11.40 TYPE 2 DIABETES MELLITUS WITH DIABETIC NEUROPATHY, WITHOUT LONG-TERM CURRENT USE OF INSULIN: ICD-10-CM

## 2025-02-13 DIAGNOSIS — C50.919 PRIMARY MALIGNANT NEOPLASM OF BREAST, UNSPECIFIED LATERALITY: ICD-10-CM

## 2025-02-13 LAB
25(OH)D3+25(OH)D2 SERPL-MCNC: 51 NG/ML (ref 30–80)
ALBUMIN SERPL-MCNC: 3.9 G/DL (ref 3.4–4.8)
ALBUMIN/GLOB SERPL: 1.2 RATIO (ref 1.1–2)
ALP SERPL-CCNC: 61 UNIT/L (ref 40–150)
ALT SERPL-CCNC: 14 UNIT/L (ref 0–55)
ANION GAP SERPL CALC-SCNC: 6 MEQ/L
AST SERPL-CCNC: 22 UNIT/L (ref 5–34)
BASOPHILS # BLD AUTO: 0.02 X10(3)/MCL
BASOPHILS NFR BLD AUTO: 0.4 %
BILIRUB SERPL-MCNC: 0.5 MG/DL
BILIRUB UR QL STRIP.AUTO: NEGATIVE
BUN SERPL-MCNC: 18.5 MG/DL (ref 9.8–20.1)
CALCIUM SERPL-MCNC: 9.2 MG/DL (ref 8.4–10.2)
CHLORIDE SERPL-SCNC: 103 MMOL/L (ref 98–107)
CHOLEST SERPL-MCNC: 186 MG/DL
CHOLEST/HDLC SERPL: 6 {RATIO} (ref 0–5)
CLARITY UR: CLEAR
CO2 SERPL-SCNC: 30 MMOL/L (ref 23–31)
COLOR UR AUTO: NORMAL
CREAT SERPL-MCNC: 0.67 MG/DL (ref 0.55–1.02)
CREAT UR-MCNC: 110.9 MG/DL (ref 45–106)
CREAT/UREA NIT SERPL: 28
EOSINOPHIL # BLD AUTO: 0.15 X10(3)/MCL (ref 0–0.9)
EOSINOPHIL NFR BLD AUTO: 2.8 %
ERYTHROCYTE [DISTWIDTH] IN BLOOD BY AUTOMATED COUNT: 11.7 % (ref 11.5–17)
EST. AVERAGE GLUCOSE BLD GHB EST-MCNC: 137 MG/DL
GFR SERPLBLD CREATININE-BSD FMLA CKD-EPI: >60 ML/MIN/1.73/M2
GLOBULIN SER-MCNC: 3.2 GM/DL (ref 2.4–3.5)
GLUCOSE SERPL-MCNC: 136 MG/DL (ref 82–115)
GLUCOSE UR QL STRIP: NEGATIVE
HBA1C MFR BLD: 6.4 %
HCT VFR BLD AUTO: 42.4 % (ref 37–47)
HDLC SERPL-MCNC: 33 MG/DL (ref 35–60)
HGB BLD-MCNC: 13.9 G/DL (ref 12–16)
HGB UR QL STRIP: NEGATIVE
IMM GRANULOCYTES # BLD AUTO: 0.01 X10(3)/MCL (ref 0–0.04)
IMM GRANULOCYTES NFR BLD AUTO: 0.2 %
KETONES UR QL STRIP: NEGATIVE
LDLC SERPL CALC-MCNC: 89 MG/DL (ref 50–140)
LEUKOCYTE ESTERASE UR QL STRIP: NEGATIVE
LYMPHOCYTES # BLD AUTO: 1.45 X10(3)/MCL (ref 0.6–4.6)
LYMPHOCYTES NFR BLD AUTO: 27.4 %
MCH RBC QN AUTO: 32.4 PG (ref 27–31)
MCHC RBC AUTO-ENTMCNC: 32.8 G/DL (ref 33–36)
MCV RBC AUTO: 98.8 FL (ref 80–94)
MICROALBUMIN UR-MCNC: 49.7 UG/ML
MICROALBUMIN/CREAT RATIO PNL UR: 44.8 MG/GM CR (ref 0–30)
MONOCYTES # BLD AUTO: 0.67 X10(3)/MCL (ref 0.1–1.3)
MONOCYTES NFR BLD AUTO: 12.6 %
NEUTROPHILS # BLD AUTO: 3 X10(3)/MCL (ref 2.1–9.2)
NEUTROPHILS NFR BLD AUTO: 56.6 %
NITRITE UR QL STRIP: NEGATIVE
PH UR STRIP: 6 [PH]
PLATELET # BLD AUTO: 140 X10(3)/MCL (ref 130–400)
PMV BLD AUTO: 9.3 FL (ref 7.4–10.4)
POTASSIUM SERPL-SCNC: 4.2 MMOL/L (ref 3.5–5.1)
PROT SERPL-MCNC: 7.1 GM/DL (ref 5.8–7.6)
PROT UR QL STRIP: NEGATIVE
RBC # BLD AUTO: 4.29 X10(6)/MCL (ref 4.2–5.4)
SODIUM SERPL-SCNC: 139 MMOL/L (ref 136–145)
SP GR UR STRIP.AUTO: 1.02 (ref 1–1.03)
T4 FREE SERPL-MCNC: 0.98 NG/DL (ref 0.7–1.48)
TRIGL SERPL-MCNC: 318 MG/DL (ref 37–140)
TSH SERPL-ACNC: 1.08 UIU/ML (ref 0.35–4.94)
UROBILINOGEN UR STRIP-ACNC: 0.2
VLDLC SERPL CALC-MCNC: 64 MG/DL
WBC # BLD AUTO: 5.3 X10(3)/MCL (ref 4.5–11.5)

## 2025-02-13 PROCEDURE — 81003 URINALYSIS AUTO W/O SCOPE: CPT

## 2025-02-13 PROCEDURE — 82306 VITAMIN D 25 HYDROXY: CPT

## 2025-02-13 PROCEDURE — 84443 ASSAY THYROID STIM HORMONE: CPT

## 2025-02-13 PROCEDURE — 85025 COMPLETE CBC W/AUTO DIFF WBC: CPT

## 2025-02-13 PROCEDURE — 84439 ASSAY OF FREE THYROXINE: CPT

## 2025-02-13 PROCEDURE — 80061 LIPID PANEL: CPT

## 2025-02-13 PROCEDURE — 83036 HEMOGLOBIN GLYCOSYLATED A1C: CPT

## 2025-02-13 PROCEDURE — 82043 UR ALBUMIN QUANTITATIVE: CPT

## 2025-02-13 PROCEDURE — 80053 COMPREHEN METABOLIC PANEL: CPT

## 2025-02-13 PROCEDURE — 36415 COLL VENOUS BLD VENIPUNCTURE: CPT

## 2025-02-18 ENCOUNTER — OFFICE VISIT (OUTPATIENT)
Dept: INTERNAL MEDICINE | Facility: CLINIC | Age: 80
End: 2025-02-18
Payer: MEDICARE

## 2025-02-18 VITALS
BODY MASS INDEX: 25.3 KG/M2 | SYSTOLIC BLOOD PRESSURE: 128 MMHG | RESPIRATION RATE: 16 BRPM | OXYGEN SATURATION: 96 % | HEIGHT: 61 IN | WEIGHT: 134 LBS | DIASTOLIC BLOOD PRESSURE: 64 MMHG | HEART RATE: 62 BPM | TEMPERATURE: 98 F

## 2025-02-18 DIAGNOSIS — J43.9 PULMONARY EMPHYSEMA, UNSPECIFIED EMPHYSEMA TYPE: Primary | ICD-10-CM

## 2025-02-18 DIAGNOSIS — E11.40 TYPE 2 DIABETES MELLITUS WITH DIABETIC NEUROPATHY, WITHOUT LONG-TERM CURRENT USE OF INSULIN: ICD-10-CM

## 2025-02-18 DIAGNOSIS — E55.9 VITAMIN D DEFICIENCY: ICD-10-CM

## 2025-02-18 DIAGNOSIS — Z72.0 TOBACCO USER: ICD-10-CM

## 2025-02-18 DIAGNOSIS — E78.5 DYSLIPIDEMIA: ICD-10-CM

## 2025-02-18 DIAGNOSIS — I73.9 PERIPHERAL VASCULAR DISEASE: ICD-10-CM

## 2025-02-18 DIAGNOSIS — Z79.811 LONG TERM (CURRENT) USE OF AROMATASE INHIBITORS: ICD-10-CM

## 2025-02-18 DIAGNOSIS — I10 ESSENTIAL HYPERTENSION: ICD-10-CM

## 2025-02-18 DIAGNOSIS — C50.919 PRIMARY MALIGNANT NEOPLASM OF BREAST, UNSPECIFIED LATERALITY: ICD-10-CM

## 2025-02-18 DIAGNOSIS — I70.209 ATHEROSCLEROSIS OF ARTERIES OF EXTREMITIES: ICD-10-CM

## 2025-02-18 DIAGNOSIS — E03.8 OTHER SPECIFIED HYPOTHYROIDISM: ICD-10-CM

## 2025-02-18 DIAGNOSIS — Z00.00 WELLNESS EXAMINATION: ICD-10-CM

## 2025-02-18 NOTE — PROGRESS NOTES
"Subjective:       Patient ID: Marlin Herrera is a 79 y.o. female.      Patient Care Team:  Mark Lawton II, MD as PCP - General (Internal Medicine)  Patricia Cohen PA as Physician Assistant (Physician Assistant)  Valentino, Vernon A., MD as Consulting Physician (Cardiology)    Chief Complaint: Diabetes, Hypothyroidism, Vitamin D Deficiency, Coronary Artery Disease, Hypertension, Dyslipidemia, and Peripheral Vascular Disease    79-year-old female seen today for followup of diabetes, hypertension, hypothyroidism, and hyperlipidemia among other conditions.       Review of Systems   Constitutional:  Negative for fever.   HENT:  Negative for nosebleeds.    Eyes:  Negative for visual disturbance.   Respiratory:  Negative for shortness of breath.    Cardiovascular:  Negative for chest pain.   Gastrointestinal:  Negative for abdominal pain.   Genitourinary:  Negative for dysuria.   Musculoskeletal:  Negative for gait problem.   Neurological:  Negative for headaches.           Patient Reported Health Risk Assessment         Objective:      Physical Exam  HENT:      Head: Normocephalic.      Mouth/Throat:      Pharynx: Oropharynx is clear.   Eyes:      Extraocular Movements: Extraocular movements intact.   Cardiovascular:      Rate and Rhythm: Normal rate.   Pulmonary:      Breath sounds: Normal breath sounds.   Abdominal:      Palpations: Abdomen is soft.   Musculoskeletal:         General: No swelling.   Skin:     General: Skin is warm.   Neurological:      General: No focal deficit present.      Mental Status: She is alert and oriented to person, place, and time.   Psychiatric:         Mood and Affect: Mood normal.         Vitals:    02/18/25 1310   BP: 128/64   Pulse: 62   Resp: 16   Temp: 97.8 °F (36.6 °C)   SpO2: 96%   Weight: 60.8 kg (134 lb)   Height: 5' 1" (1.549 m)                     No data to display                  2/18/2025     1:15 PM 10/3/2024    10:00 AM 8/15/2024     1:30 PM 4/9/2024    10:40 " AM 3/19/2024     2:00 PM 2/15/2024     2:00 PM 9/7/2023     2:00 PM   Fall Risk Assessment - Outpatient   Mobility Status Ambulatory Ambulatory Ambulatory Ambulatory Ambulatory Ambulatory Ambulatory   Number of falls 0 1 0 1 1 1 with injury 0   Identified as fall risk False False False False False True False                  Assessment:       Problem List Items Addressed This Visit       Atherosclerosis of arteries of extremities    Relevant Orders    CBC Auto Differential    Comprehensive Metabolic Panel    Lipid Panel    Microalbumin/Creatinine Ratio, Urine    Urinalysis, Reflex to Urine Culture    T4, Free    TSH    Hemoglobin A1C    Type 2 diabetes mellitus with diabetic neuropathy, without long-term current use of insulin    Relevant Orders    CBC Auto Differential    Comprehensive Metabolic Panel    Lipid Panel    Microalbumin/Creatinine Ratio, Urine    Urinalysis, Reflex to Urine Culture    T4, Free    TSH    Hemoglobin A1C    Essential hypertension    Relevant Orders    CBC Auto Differential    Comprehensive Metabolic Panel    Lipid Panel    Microalbumin/Creatinine Ratio, Urine    Urinalysis, Reflex to Urine Culture    T4, Free    TSH    Hemoglobin A1C    Peripheral vascular disease    Relevant Orders    CBC Auto Differential    Comprehensive Metabolic Panel    Lipid Panel    Microalbumin/Creatinine Ratio, Urine    Urinalysis, Reflex to Urine Culture    T4, Free    TSH    Hemoglobin A1C    Primary malignant neoplasm of breast    Relevant Orders    CBC Auto Differential    Comprehensive Metabolic Panel    Lipid Panel    Microalbumin/Creatinine Ratio, Urine    Urinalysis, Reflex to Urine Culture    T4, Free    TSH    Hemoglobin A1C    Tobacco user    Relevant Orders    CBC Auto Differential    Comprehensive Metabolic Panel    Lipid Panel    Microalbumin/Creatinine Ratio, Urine    Urinalysis, Reflex to Urine Culture    T4, Free    TSH    Hemoglobin A1C    Vitamin D deficiency    Relevant Orders    CBC Auto  Differential    Comprehensive Metabolic Panel    Lipid Panel    Microalbumin/Creatinine Ratio, Urine    Urinalysis, Reflex to Urine Culture    T4, Free    TSH    Hemoglobin A1C    Hypothyroidism    Relevant Orders    CBC Auto Differential    Comprehensive Metabolic Panel    Lipid Panel    Microalbumin/Creatinine Ratio, Urine    Urinalysis, Reflex to Urine Culture    T4, Free    TSH    Hemoglobin A1C    Dyslipidemia    Relevant Orders    CBC Auto Differential    Comprehensive Metabolic Panel    Lipid Panel    Microalbumin/Creatinine Ratio, Urine    Urinalysis, Reflex to Urine Culture    T4, Free    TSH    Hemoglobin A1C    Pulmonary emphysema, unspecified emphysema type - Primary    Relevant Orders    CBC Auto Differential    Comprehensive Metabolic Panel    Lipid Panel    Microalbumin/Creatinine Ratio, Urine    Urinalysis, Reflex to Urine Culture    T4, Free    TSH    Hemoglobin A1C    Wellness examination    Relevant Orders    CBC Auto Differential    Comprehensive Metabolic Panel    Lipid Panel    Microalbumin/Creatinine Ratio, Urine    Urinalysis, Reflex to Urine Culture    T4, Free    TSH    Hemoglobin A1C    Long term (current) use of aromatase inhibitors    Relevant Orders    CBC Auto Differential    Comprehensive Metabolic Panel    Lipid Panel    Microalbumin/Creatinine Ratio, Urine    Urinalysis, Reflex to Urine Culture    T4, Free    TSH    Hemoglobin A1C       Medication List with Changes/Refills   Current Medications    AMITRIPTYLINE (ELAVIL) 25 MG TABLET    Take 1 tablet (25 mg total) by mouth every evening.    AMLODIPINE (NORVASC) 5 MG TABLET    Take 1 tablet (5 mg total) by mouth 2 (two) times daily.    ASPIRIN (ECOTRIN) 81 MG EC TABLET    aspirin 81 mg tablet,delayed release   Take 1 tablet every day by oral route.    BLOOD SUGAR DIAGNOSTIC STRP      Free style Lite Test strips, See Instructions, DX: E11.9 to test sugar daily, # 100 EA, 11 Refill(s)    BUSPIRONE (BUSPAR) 5 MG TAB    Take 1 tablet (5  mg total) by mouth 2 (two) times daily as needed (Anxiety).    CALCIUM CARBONATE (OS-YRN) 600 MG CALCIUM (1,500 MG) TAB    Calcium 600 mg calcium (1,500 mg) tablet   Take 1 tablet every day by oral route.    ESCITALOPRAM OXALATE (LEXAPRO) 20 MG TABLET    Take 1 tablet (20 mg total) by mouth once daily.    LETROZOLE (FEMARA) 2.5 MG TAB    Take 1 tablet (2.5 mg total) by mouth once daily.    LEVOTHYROXINE (SYNTHROID) 88 MCG TABLET    Take 1 tablet (88 mcg total) by mouth once daily.    LOSARTAN-HYDROCHLOROTHIAZIDE 100-12.5 MG (HYZAAR) 100-12.5 MG TAB    Take 1 tablet by mouth once daily.    MELOXICAM (MOBIC) 7.5 MG TABLET    Take 1 tablet (7.5 mg total) by mouth once daily.    METOPROLOL SUCCINATE (TOPROL-XL) 100 MG 24 HR TABLET    Take 1 tablet (100 mg total) by mouth once daily.    OMEGA 3-DHA-EPA-FISH OIL 1,000 MG (120 MG-180 MG) CAP    Fish Oil 1,000 mg (120 mg-180 mg) capsule   Take 1 capsule every day by oral route.    PANTOPRAZOLE (PROTONIX) 40 MG TABLET    Take 1 tablet (40 mg total) by mouth once daily.    ROSUVASTATIN (CRESTOR) 10 MG TABLET    Take 1 tablet (10 mg total) by mouth every evening.        Plan:       1. Diabetes: Hemoglobin A1c is 6.4. Metformin was stopped because of diarrhea or dyspepsia. She is no longer on medication, yet her glucose is very well controlled     2. Hypertension: Stable     3. Hyperlipidemia: LDL at goal. Calcium score 1.3 in 2018     4. Cervical spinal stenosis: She had surgery in 2013. Continue meloxicam. She saw pain management once (Dr. Smallwood assistant) in 2022 and they suggested Toradol injection PRN.     5. Murguia's esophagus: EGD in 5/2021 with Dr. Felton. Referred at last visit because of dysphagia     6. Gastroesophageal reflux disease:  Continue pantoprazole     7. Hypothyroidism: TSH normal     8. Insomnia: She has worsening insomnia. Decreased amitriptyline back to 25 mg because of drowsiness      9. Vitamin D deficiency: Continue vitamin D     10.  Osteoarthritis: L knee pain worsening. Referred to Ortho previously, considering knee replacement     11. Urinary tract infection: Treated with Cipro in      12. Tobacco use: She smokes half a pack per day. Lungs screening CT negative in 2024.  Smoking cessation encouraged today (3 minutes)     13. Stage IA left breast cancer: Diagnosed in 2019 status post mastectomy. Continue Femara.     14. Peripheral vascular disease: CT angiogram of the lower extremities showed diffuse disease. She has claudication and pain in her lower back and right leg. She will F/U with Dr. Steinberg and Dr. Valentino. Continue aspirin 81mg daily     15. Vitiligo: Her brother has vitiligo as well    16. COPD:  Seen on imaging of the chest, smoking cessation encouraged.  Not on inhalers    17. Anxiety: Continue Lexapro and buspirone     18. Wellness: Colonoscopy normal in 2020. MMG/ultrasound 2024          Her partner, Mr. Madison, had metastatic lung cancer and  in 2019      Medicare Annual Wellness and Personalized Prevention Plan:   Fall Risk + Home Safety + Hearing Impairment + Depression Screen + Cognitive Impairment Screen + Health Risk Assessment all reviewed    Health Maintenance Topics with due status: Not Due       Topic Last Completion Date    DEXA Scan 2023    TETANUS VACCINE 2024    Aspirin/Antiplatelet Therapy 10/03/2024    Diabetic Eye Exam 2024    Diabetes Urine Screening 2025    Lipid Panel 2025    Hemoglobin A1c 2025      The patient's Health Maintenance was reviewed and the following appears to be due at this time:   Health Maintenance Due   Topic Date Due    COVID-19 Vaccine ( season) 2024       Advance Care Planning   I attest to discussing Advance Care Planning with patient and/or family member.  Education was provided including the importance of the Health Care Power of , Advance Directives, and/or [place] documentation.  The patient expressed  understanding to the importance of this information and discussion.  Length of ACP conversation in minutes: 0    Advance Care Planning     Date: 08/14/2024  Patient did not wish or was not able to name a surrogate decision maker or provide an Advance Care Plan.           Opioid Screening: Patient medication list reviewed, patient is not taking prescription opioids. Patient is not using additional opioids than prescribed. Patient is at low risk of substance abuse based on this opioid use history.     No follow-ups on file. In addition to their scheduled follow up, the patient has also been instructed to follow up on as needed basis.

## 2025-03-07 ENCOUNTER — TELEPHONE (OUTPATIENT)
Dept: ORTHOPEDICS | Facility: CLINIC | Age: 80
End: 2025-03-07
Payer: MEDICARE

## 2025-03-07 NOTE — TELEPHONE ENCOUNTER
Contacted patient to reschedule canceled appointment from 1/23/25 due to the weather. Patient states she will call back to reschedule.

## 2025-03-20 ENCOUNTER — OFFICE VISIT (OUTPATIENT)
Dept: ORTHOPEDICS | Facility: CLINIC | Age: 80
End: 2025-03-20
Payer: MEDICARE

## 2025-03-20 ENCOUNTER — HOSPITAL ENCOUNTER (OUTPATIENT)
Dept: RADIOLOGY | Facility: CLINIC | Age: 80
Discharge: HOME | End: 2025-03-20
Attending: ORTHOPAEDIC SURGERY
Payer: MEDICARE

## 2025-03-20 VITALS
WEIGHT: 134.06 LBS | SYSTOLIC BLOOD PRESSURE: 145 MMHG | HEIGHT: 61 IN | HEART RATE: 76 BPM | DIASTOLIC BLOOD PRESSURE: 65 MMHG | BODY MASS INDEX: 25.31 KG/M2

## 2025-03-20 DIAGNOSIS — M25.562 LEFT KNEE PAIN, UNSPECIFIED CHRONICITY: ICD-10-CM

## 2025-03-20 DIAGNOSIS — M17.12 PRIMARY OSTEOARTHRITIS OF LEFT KNEE: ICD-10-CM

## 2025-03-20 DIAGNOSIS — M25.562 LEFT KNEE PAIN, UNSPECIFIED CHRONICITY: Primary | ICD-10-CM

## 2025-03-20 PROCEDURE — 73564 X-RAY EXAM KNEE 4 OR MORE: CPT | Mod: LT,,, | Performed by: ORTHOPAEDIC SURGERY

## 2025-03-20 RX ORDER — BETAMETHASONE SODIUM PHOSPHATE AND BETAMETHASONE ACETATE 3; 3 MG/ML; MG/ML
12 INJECTION, SUSPENSION INTRA-ARTICULAR; INTRALESIONAL; INTRAMUSCULAR; SOFT TISSUE
Status: DISCONTINUED | OUTPATIENT
Start: 2025-03-20 | End: 2025-03-20 | Stop reason: HOSPADM

## 2025-03-20 RX ORDER — LIDOCAINE HYDROCHLORIDE 20 MG/ML
5 INJECTION, SOLUTION EPIDURAL; INFILTRATION; INTRACAUDAL; PERINEURAL
Status: DISCONTINUED | OUTPATIENT
Start: 2025-03-20 | End: 2025-03-20 | Stop reason: HOSPADM

## 2025-03-20 RX ADMIN — LIDOCAINE HYDROCHLORIDE 5 ML: 20 INJECTION, SOLUTION EPIDURAL; INFILTRATION; INTRACAUDAL; PERINEURAL at 02:03

## 2025-03-20 RX ADMIN — BETAMETHASONE SODIUM PHOSPHATE AND BETAMETHASONE ACETATE 12 MG: 3; 3 INJECTION, SUSPENSION INTRA-ARTICULAR; INTRALESIONAL; INTRAMUSCULAR; SOFT TISSUE at 02:03

## 2025-03-20 NOTE — PROCEDURES
Large Joint Aspiration/Injection: L knee    Date/Time: 3/20/2025 2:45 PM    Performed by: Alberto Sol MD  Authorized by: Alberto Sol MD    Consent Done?:  Yes (Verbal)  Indications:  Arthritis and pain  Timeout: prior to procedure the correct patient, procedure, and site was verified    Prep: patient was prepped and draped in usual sterile fashion      Details:  Needle Size:  22 G  Approach:  Anterolateral  Location:  Knee  Site:  L knee  Medications:  5 mL LIDOcaine (PF) 20 mg/mL (2%) 20 mg/mL (2 %); 12 mg betamethasone acetate-betamethasone sodium phosphate 6 mg/mL

## 2025-03-20 NOTE — PROGRESS NOTES
Chief Complaint:   Chief Complaint   Patient presents with    Left Knee - Pain     Pain is off and on and thinks she may have a baker's cyst on the lateral aspect of the knee. Has constant swelling. Had injections in the past which helped until they worn off. Is taking mobic which has helped with most of the pain. Stated she does have blockage in sameer legs which she is seeing cardio Dr. Harkins.        History of present illness:    History of Present Illness  The patient is an 79-year-old female who presents for evaluation of bilateral knee pain.    She reports persistent swelling in her left knee, which has been previously managed with injections. She was diagnosed with a Baker's cyst in the past and continues to experience symptoms. Her arthritis has progressively worsened, leading to significant discomfort. She experiences severe pain during ambulation, necessitating frequent rest periods. The use of an Ace bandage provides some relief. She has attempted various treatments including Voltaren and CBD, but these have not provided significant relief. She expresses interest in receiving a cortisone injection in her left knee today. She also reports a history of arterial blockage in her legs, which she continues to manage.    MEDICATIONS  Current: Voltaren, CBD    Past Medical History:   Diagnosis Date    Atherosclerosis of arteries of extremities     Breast cancer        Past Surgical History:   Procedure Laterality Date    CATARACT EXTRACTION      HYSTERECTOMY      MASTECTOMY Left 07/2020    NASAL SEPTUM SURGERY      NECK SURGERY         Current Outpatient Medications   Medication Sig    amitriptyline (ELAVIL) 25 MG tablet Take 1 tablet (25 mg total) by mouth every evening.    amLODIPine (NORVASC) 5 MG tablet Take 1 tablet (5 mg total) by mouth 2 (two) times daily.    aspirin (ECOTRIN) 81 MG EC tablet aspirin 81 mg tablet,delayed release   Take 1 tablet every day by oral route.    blood sugar diagnostic Strp   Free  style Lite Test strips, See Instructions, DX: E11.9 to test sugar daily, # 100 EA, 11 Refill(s)    busPIRone (BUSPAR) 5 MG Tab Take 1 tablet (5 mg total) by mouth 2 (two) times daily as needed (Anxiety).    calcium carbonate (OS-YRN) 600 mg calcium (1,500 mg) Tab Calcium 600 mg calcium (1,500 mg) tablet   Take 1 tablet every day by oral route.    EScitalopram oxalate (LEXAPRO) 20 MG tablet Take 1 tablet (20 mg total) by mouth once daily.    letrozole (FEMARA) 2.5 mg Tab Take 1 tablet (2.5 mg total) by mouth once daily.    levothyroxine (SYNTHROID) 88 MCG tablet Take 1 tablet (88 mcg total) by mouth once daily.    losartan-hydrochlorothiazide 100-12.5 mg (HYZAAR) 100-12.5 mg Tab Take 1 tablet by mouth once daily.    meloxicam (MOBIC) 7.5 MG tablet Take 1 tablet (7.5 mg total) by mouth once daily.    metoprolol succinate (TOPROL-XL) 100 MG 24 hr tablet Take 1 tablet (100 mg total) by mouth once daily.    pantoprazole (PROTONIX) 40 MG tablet Take 1 tablet (40 mg total) by mouth once daily.    rosuvastatin (CRESTOR) 10 MG tablet Take 1 tablet (10 mg total) by mouth every evening.     No current facility-administered medications for this visit.       Review of patient's allergies indicates:   Allergen Reactions    Ace inhibitors Swelling    Lisinopril     Metformin Diarrhea    Phenazopyridine     Nitrofurantoin monohyd/m-cryst Rash       Family History   Problem Relation Name Age of Onset    Pancreatic cancer Mother      Prostate cancer Father      Breast cancer Sister         Social History[1]        Review of Systems:    Constitution: Negative for chills, fever, and sweats.  Negative for unexplained weight loss.    HENT:  Negative for headaches and blurry vision.    Cardiovascular:Negative for chest pain or irregular heart beat. Negative for hypertension.    Respiratory:  Negative for cough and shortness of breath.    Gastrointestinal: Negative for abdominal pain, heartburn, melena, nausea, and  vomitting.    Genitourinary:  Negative bladder incontinence and dysuria.    Musculoskeletal:  See HPI    Neurological: Negative for numbness.    Psychiatric/Behavioral: Negative for depression.  The patient is not nervous/anxious.      Endocrine: Negative for polyuria    Hematologic/Lymphatic: Negative for bleeding problem.  Does not bruise/bleed easily.    Skin: Negative for poor would healing and rash      Physical Examination:    Vital Signs:    Vitals:    03/20/25 1428   BP: (!) 145/65   Pulse: 76       Body mass index is 25.33 kg/m².    General: No acute distress, alert and oriented, healthy appearing    HEENT: Head is atraumatic, mucous membranes are moist    Neck: Supples, no JVD    Cardiovascular: Palpable dorsalis pedis and posterior tibial pulses, regular rate and rhythm to those pulses    Lungs: Breathing non-labored    Skin: no rashes appreciated    Neurologic: Can flex and extend knees, ankles, and toes. Sensation is grossly intact    General: No acute distress, alert and oriented, healthy appearing    HEENT: Head is atraumatic, mucous membranes are moist    Neck: Supples, no JVD    Cardiovascular: Palpable dorsalis pedis and posterior tibial pulses, regular rate and rhythm to those pulses    Lungs: Breathing non-labored    Skin: no rashes appreciated    Neurologic: Can flex and extend knees, ankles, and toes. Sensation is grossly intact    Left knee:  Patient has crepitus range of motion.  It is extension of 10.  Flexion 95.  Significant crepitus noted throughout range of motion.  Brisk cap refill disappeared sensation intact to with the foot.    X-rays:  Three views left knee reviewed with the patient with end-stage osteoarthritis.  Has a complete loss of joint space and bone-on-bone articulation of the left knee.     Assessment::  Left knee osteoarthritis    Plan:  Plan to give her a cortisone injections today.  It is discomfort other long term issues and treatment including eventual operative  intervention.  The current time she is not wish to proceed with operative intervention.  Plan to see her back in 6-8 weeks.  Cortisone shot today.    This note was generated with the assistance of ambient listening technology. Verbal consent was obtained by the patient and accompanying visitor(s) for the recording of patient appointment to facilitate this note. I attest to having reviewed and edited the generated note for accuracy, though some syntax or spelling errors may persist. Please contact the author of this note for any clarification.      This note was created using [employer] voice recognition software that occasionally misinterpreted phrases or words.    Consult note is delivered via Epic messaging service.         [1]   Social History  Socioeconomic History    Marital status:    Tobacco Use    Smoking status: Every Day     Current packs/day: 0.50     Types: Cigarettes    Smokeless tobacco: Never   Substance and Sexual Activity    Alcohol use: Yes    Drug use: Never    Sexual activity: Not Currently     Social Drivers of Health     Physical Activity: Sufficiently Active (8/10/2023)    Exercise Vital Sign     Days of Exercise per Week: 5 days     Minutes of Exercise per Session: 30 min   Stress: No Stress Concern Present (8/10/2023)    [place] [employer] - Occupational Stress Questionnaire     Feeling of Stress : Not at all

## 2025-04-14 NOTE — PROGRESS NOTES
"    [facility] Vascular - Clinic Note  Orlando Steinberg MD      Patient Name: Marlin Herrera                   : 1945      MRN: [id number]   Visit Date: 4/15/2025       History Present Illness     Reason for Visit: Worsening Leg Swelling    Ms. Herrera presents to the clinic for evaluation of worsening leg heaviness and fatigue. She has a history of lower extremity swelling and varicose veins to both legs. Most recent venous ultrasound was obtained in . She reports that she has experiencing bilateral leg pain when she is ambulating.  This pain originates from her bilateral hip area and extends throughout legs - there is a 15 minutes recovery time.     She is being followed by Dr. Sol for arthritic left knee pain. She is scheduled to see him next month to discuss knee replacement surgery.      No other recent health changes.      REVIEW OF SYSTEMS:  Review of systems conducted, negative except as stated in the history of present illness. See HPI for details.        Physical Exam      Vitals:    04/15/25 1455   BP: 135/61   BP Location: Right arm   Patient Position: Sitting   Pulse: 76   Weight: 59 kg (130 lb)   Height: 5' 1" (1.549 m)          General: well-nourished, no acute distress, and healthy appearing, ambulating normally, alert, pleasant, conversant, and oriented  Neurologic: cranial nerves are grossly intact, no neurologic deficits, no motor deficits, and no sensory deficits  HEENT: grossly normal and no scleral icterus  Neck/Chest: normal  and soft without lymphadenopathy  Respiratory: breathing easily and without respiratory distress  Abdomen: normal and soft  Cardiology: regular rate and rhythm    Upper Extremity Arterial Exam:   Right - radial is palpable  Left - radial is palpable    Lower Extremity Arterial Exam:  Right - posterior tibial is absent and dorsalis pedis is absent  Left - posterior tibial is absent and dorsalis pedis is absent    Musculoskeletal:   Upper Extremity: normal " bilateral hand function  Lower Extremity: no edema present to bilateral lower extremities    Skin: spider veins present to bilateral lower extremities (L > R)          Assessment and Plan     Ms. Herrera is a 79 y.o. with lower extremity heaviness and fatigue.  Will obtain bilateral lower extremity arterial duplex and ABIs  given her history of lower extremity arterial disease and recent worsening.  Given that her symptoms are bilateral, starts at low back/hip, and extends all the way down her legs, I suspect her symptoms could be from radiculopathy associated with lumbar spine disease.   Aortoiliac occlusive disease remains possible as well.     Maintain follow up for her carotid disease as scheduled in May as previously planned.         1. Atherosclerosis of arteries of extremities  - CV Ultrasound doppler arterial legs bilat; Future  - CV Ankle Brachial Indices (AUGUSTO); Future          Imaging Obtained/Reviewed   Study: none  Date:           Medical History     Past Medical History:   Diagnosis Date    Atherosclerosis of arteries of extremities     Breast cancer      Past Surgical History:   Procedure Laterality Date    CATARACT EXTRACTION      HYSTERECTOMY      MASTECTOMY Left 07/2020    NASAL SEPTUM SURGERY      NECK SURGERY       Family History   Problem Relation Name Age of Onset    Pancreatic cancer Mother      Prostate cancer Father      Breast cancer Sister       Social History[1]  Current Outpatient Medications   Medication Instructions    amitriptyline (ELAVIL) 25 mg, Oral, Nightly    amLODIPine (NORVASC) 5 mg, Oral, 2 times daily    aspirin (ECOTRIN) 81 MG EC tablet aspirin 81 mg tablet,delayed release   Take 1 tablet every day by oral route.    blood sugar diagnostic Strp   Free style Lite Test strips, See Instructions, DX: E11.9 to test sugar daily, # 100 EA, 11 Refill(s)    busPIRone (BUSPAR) 5 mg, Oral, 2 times daily PRN    calcium carbonate (OS-YRN) 600 mg, Daily PRN    EScitalopram oxalate (LEXAPRO)  20 mg, Oral, Daily    letrozole (FEMARA) 2.5 mg, Oral, Daily    levothyroxine (SYNTHROID) 88 mcg, Oral, Daily    losartan-hydrochlorothiazide 100-12.5 mg (HYZAAR) 100-12.5 mg Tab 1 tablet, Oral, Daily    meloxicam (MOBIC) 7.5 mg, Oral, Daily    metoprolol succinate (TOPROL-XL) 100 mg, Oral, Daily    pantoprazole (PROTONIX) 40 mg, Oral, Daily    rosuvastatin (CRESTOR) 10 mg, Oral, Nightly     Review of patient's allergies indicates:   Allergen Reactions    Ace inhibitors Swelling    Lisinopril     Metformin Diarrhea    Phenazopyridine     Nitrofurantoin monohyd/m-cryst Rash       Patient Care Team:  Mark Lawton II, MD as PCP - General (Internal Medicine)  Valentino, Vernon A., MD as Consulting Physician (Cardiology)  Orlando Steinberg MD as Vascular Surgery (Vascular Surgery)  Alberto Sol MD as Consulting Physician (Orthopedic Surgery)        No follow-ups on file. In addition to their scheduled follow up, the patient has also been instructed to follow up on as needed basis.     Future Appointments   Date Time Provider Department Center   4/24/2025  3:00 PM CV [facility]   4/24/2025  3:45 PM CV [facility]   5/15/2025  2:30 PM Alberto Sol MD [facility]   5/27/2025  2:30 PM CV [facility]   5/27/2025  3:00 PM Orlando Steinberg MD [facility]   8/21/2025  1:30 PM Mark Lawton II, MD [facility] [id number]   10/9/2025 11:00 AM [facility] Nash [facility]             [1]   Social History  Socioeconomic History    Marital status:    Tobacco Use    Smoking status: Every Day     Current packs/day: 0.50     Types: Cigarettes    Smokeless tobacco: Never   Substance and Sexual Activity    Alcohol use: Yes    Drug use: Never    Sexual activity: Not Currently     Social Drivers of Health     Physical Activity: Sufficiently Active (8/10/2023)     Exercise Vital Sign     Days of Exercise per Week: 5 days     Minutes of Exercise per Session: 30 min   Stress: No Stress Concern Present (8/10/2023)    [employer] [place] - Occupational Stress Questionnaire     Feeling of Stress : Not at all

## 2025-04-15 ENCOUNTER — OFFICE VISIT (OUTPATIENT)
Dept: VASCULAR SURGERY | Facility: CLINIC | Age: 80
End: 2025-04-15
Payer: MEDICARE

## 2025-04-15 VITALS
HEART RATE: 76 BPM | SYSTOLIC BLOOD PRESSURE: 135 MMHG | DIASTOLIC BLOOD PRESSURE: 61 MMHG | HEIGHT: 61 IN | WEIGHT: 130 LBS | BODY MASS INDEX: 24.55 KG/M2

## 2025-04-15 DIAGNOSIS — I70.209 ATHEROSCLEROSIS OF ARTERIES OF EXTREMITIES: Primary | ICD-10-CM

## 2025-04-15 PROCEDURE — 99213 OFFICE O/P EST LOW 20 MIN: CPT | Mod: ,,, | Performed by: SPECIALIST

## 2025-04-30 ENCOUNTER — TELEPHONE (OUTPATIENT)
Dept: VASCULAR SURGERY | Facility: CLINIC | Age: 80
End: 2025-04-30
Payer: MEDICARE

## 2025-04-30 NOTE — TELEPHONE ENCOUNTER
Arterial duplex and ABIs reviewed by Dr. Steinberg. Mild right superficial femoral artery disease with adequate ankle pressure bilaterally. This narrow is unlikely to be causing any significant symptoms. Advised evaluation for orthopedic / musculoskeletal. Results and recommendations discussed with Ms. Herrera. She verbalizes understanding.

## 2025-05-14 DIAGNOSIS — Z00.00 WELLNESS EXAMINATION: ICD-10-CM

## 2025-05-14 RX ORDER — LEVOTHYROXINE SODIUM 88 UG/1
88 TABLET ORAL
Qty: 90 TABLET | Refills: 3 | Status: SHIPPED | OUTPATIENT
Start: 2025-05-14 | End: 2026-05-14

## 2025-05-15 ENCOUNTER — OFFICE VISIT (OUTPATIENT)
Dept: ORTHOPEDICS | Facility: CLINIC | Age: 80
End: 2025-05-15
Payer: MEDICARE

## 2025-05-15 VITALS
SYSTOLIC BLOOD PRESSURE: 146 MMHG | BODY MASS INDEX: 25.3 KG/M2 | HEART RATE: 77 BPM | DIASTOLIC BLOOD PRESSURE: 62 MMHG | HEIGHT: 61 IN | WEIGHT: 134 LBS

## 2025-05-15 DIAGNOSIS — M17.12 PRIMARY OSTEOARTHRITIS OF LEFT KNEE: Primary | ICD-10-CM

## 2025-05-16 NOTE — PROGRESS NOTES
Chief Complaint:   Chief Complaint   Patient presents with    Left Knee - Follow-up     2 months sp Left knee cortisone injection on 3/20/25, gave relief for a while. Having pain around the entire knee, baker's cyst in the posterior knee. Pt has blockages in IDALIA knee. Pt ready to schedule sx. Having some swelling in the knee and ankle. Alternating Tylenol and meloxicam, giving some relief.        History of present illness:    History of Present Illness  The patient presents for evaluation of bilateral knee pain.    She reports a new onset of pain in her knees, describing it as a sensation akin to popping. Additionally, she mentions the presence of a Baker's cyst, which is causing discomfort around her knee. Her mobility is limited, as she is unable to walk long distances without having to pause due to leg pain. She has been experiencing this pain for over a year and is considering surgical intervention. She expresses concern about potential blockages in her legs, which could necessitate surgery. An ultrasound of her legs revealed no significant blockages. She is scheduled for a carotid ultrasound on 05/27/2025. She is curious about the possibility of home-based physical therapy post-surgery, as she does not drive.    Past Medical History:   Diagnosis Date    Atherosclerosis of arteries of extremities     Breast cancer        Past Surgical History:   Procedure Laterality Date    CATARACT EXTRACTION      HYSTERECTOMY      MASTECTOMY Left 07/2020    NASAL SEPTUM SURGERY      NECK SURGERY         Current Outpatient Medications   Medication Sig    amitriptyline (ELAVIL) 25 MG tablet Take 1 tablet (25 mg total) by mouth every evening.    amLODIPine (NORVASC) 5 MG tablet Take 1 tablet (5 mg total) by mouth 2 (two) times daily.    aspirin (ECOTRIN) 81 MG EC tablet aspirin 81 mg tablet,delayed release   Take 1 tablet every day by oral route.    blood sugar diagnostic Strp   Free style Lite Test strips, See Instructions,  DX: E11.9 to test sugar daily, # 100 EA, 11 Refill(s)    busPIRone (BUSPAR) 5 MG Tab Take 1 tablet (5 mg total) by mouth 2 (two) times daily as needed (Anxiety).    calcium carbonate (OS-YRN) 600 mg calcium (1,500 mg) Tab Take 600 mg by mouth daily as needed.    EScitalopram oxalate (LEXAPRO) 20 MG tablet Take 1 tablet (20 mg total) by mouth once daily. (Patient taking differently: Take 20 mg by mouth once daily. Half a pill at night and fourth of a pill in morning)    letrozole (FEMARA) 2.5 mg Tab Take 1 tablet (2.5 mg total) by mouth once daily.    levothyroxine (SYNTHROID) 88 MCG tablet Take 1 tablet (88 mcg total) by mouth before breakfast.    losartan-hydrochlorothiazide 100-12.5 mg (HYZAAR) 100-12.5 mg Tab Take 1 tablet by mouth once daily.    meloxicam (MOBIC) 7.5 MG tablet Take 1 tablet (7.5 mg total) by mouth once daily.    metoprolol succinate (TOPROL-XL) 100 MG 24 hr tablet Take 1 tablet (100 mg total) by mouth once daily.    pantoprazole (PROTONIX) 40 MG tablet Take 1 tablet (40 mg total) by mouth once daily.    rosuvastatin (CRESTOR) 10 MG tablet Take 1 tablet (10 mg total) by mouth every evening.     No current facility-administered medications for this visit.       Review of patient's allergies indicates:   Allergen Reactions    Ace inhibitors Swelling    Lisinopril     Metformin Diarrhea    Phenazopyridine     Nitrofurantoin monohyd/m-cryst Rash       Family History   Problem Relation Name Age of Onset    Pancreatic cancer Mother      Prostate cancer Father      Breast cancer Sister         Social History[1]        Review of Systems:    Constitution: Negative for chills, fever, and sweats.  Negative for unexplained weight loss.    HENT:  Negative for headaches and blurry vision.    Cardiovascular:Negative for chest pain or irregular heart beat. Negative for hypertension.    Respiratory:  Negative for cough and shortness of breath.    Gastrointestinal: Negative for abdominal pain, heartburn, melena,  nausea, and vomitting.    Genitourinary:  Negative bladder incontinence and dysuria.    Musculoskeletal:  See HPI    Neurological: Negative for numbness.    Psychiatric/Behavioral: Negative for depression.  The patient is not nervous/anxious.      Endocrine: Negative for polyuria    Hematologic/Lymphatic: Negative for bleeding problem.  Does not bruise/bleed easily.    Skin: Negative for poor would healing and rash      Physical Examination:    Vital Signs:    Vitals:    05/15/25 1503   BP: (!) 146/62   Pulse: 77       Body mass index is 25.32 kg/m².    General: No acute distress, alert and oriented, healthy appearing    HEENT: Head is atraumatic, mucous membranes are moist    Neck: Supples, no JVD    Cardiovascular: Palpable dorsalis pedis and posterior tibial pulses, regular rate and rhythm to those pulses    Lungs: Breathing non-labored    Skin: no rashes appreciated    Neurologic: Can flex and extend knees, ankles, and toes. Sensation is grossly intact    Left knee:  Significant crepitus range of motion left knee.  Brisk capillary refill distally.  Sensation intact distally.  Internal external rotation of the hip without significant pain or discomfort.  Valgus alignment of the right knee, varus alignment of the left    X-rays:  Four views of the left knee reviewed today.  Patient with end-stage osteoarthritis that has complete loss of joint space and bone-on-bone articulation.  Kellgren Valeriy grade 4 changes noted of the left knee.     Assessment::  Left knee osteoarthritis    Plan:  Discussed all treatment with the patient.  Patient with end-stage osteoarthritis of the left knee.  It had tried injections.  This has failed to relieve her symptoms.  She would like to proceed with surgical intervention however she has a carotid ultrasound in the next couple of weeks.  She wants with the outcome of this prior proceeding with surgery on her knee which I understand and agree with.  It had patient will contact us  if she wishes to proceed with left total knee arthroplasty we will get him scheduled.  We would also work on appropriate clearances that point in time 2.    This note was generated with the assistance of ambient listening technology. Verbal consent was obtained by the patient and accompanying visitor(s) for the recording of patient appointment to facilitate this note. I attest to having reviewed and edited the generated note for accuracy, though some syntax or spelling errors may persist. Please contact the author of this note for any clarification.      This note was created using [employer] voice recognition software that occasionally misinterpreted phrases or words.    Consult note is delivered via Epic messaging service.         [1]   Social History  Socioeconomic History    Marital status:    Tobacco Use    Smoking status: Every Day     Current packs/day: 0.50     Types: Cigarettes    Smokeless tobacco: Never   Substance and Sexual Activity    Alcohol use: Yes    Drug use: Never    Sexual activity: Not Currently     Social Drivers of Health     Physical Activity: Sufficiently Active (8/10/2023)    Exercise Vital Sign     Days of Exercise per Week: 5 days     Minutes of Exercise per Session: 30 min   Stress: No Stress Concern Present (8/10/2023)    [employer] - Occupational Stress Questionnaire     Feeling of Stress : Not at all

## 2025-05-22 NOTE — PROGRESS NOTES
[facility] Vascular - Clinic Note  Orlando Steinberg MD      Patient Name: Marlin Herrera                   : 1945      MRN: [id number]   Visit Date: 2025       History Present Illness     Reason for Visit: Carotid Artery Disease    Ms. Herrera presents to the clinic for 1 year surveillance follow up with carotid duplex. She has no previous intervention.  She denies signs or symptoms of stroke or TIA.  She has followed up with Dr. Sol with anticipation of scheduling left total knee arthroplasty.       REVIEW OF SYSTEMS:  Review of systems conducted, negative except as stated in the history of present illness. See HPI for details.        Physical Exam      Vitals:    25 1532   BP: (!) 157/52   BP Location: Right arm   Patient Position: Sitting   Pulse: 69          General: well-nourished, no acute distress, and healthy appearing, ambulating normally, alert, pleasant, conversant, and oriented  Neurologic: cranial nerves are grossly intact, no neurologic deficits, no motor deficits, and no sensory deficits  HEENT: grossly normal and no scleral icterus  Neck/Chest: normal  and soft without lymphadenopathy  Respiratory: breathing easily and without respiratory distress  Abdomen: normal and soft  Cardiology: regular rate and rhythm    Upper Extremity Arterial Exam:   Right - radial is palpable  Left - radial is palpable      Musculoskeletal:   Upper Extremity: normal bilateral hand function  Lower Extremity: trace edema present to bilateral lower extremities                Assessment and Plan     Ms. Herrera is an 79 y.o. with asymptomatic carotid artery stenosis.  Carotid duplex obtained today demonstrates RIGHT carotid with moderate stenosis with a peak systolic velocity of 150 cm/s (ICA/CCA ratio of 2.38). LEFT carotid demonstrates mild stenosis with a peak systolic velocity of 118 cm/s. Recommend follow up in 6 months with repeat carotid duplex. Continue Aspirin and statin therapy.    OK to  proceed with upcoming orthopedic surgery.       1. Bilateral carotid artery stenosis  - CV Ultrasound Bilateral Doppler Carotid; Future    2. Smoker  We discussed the detrimental effects of tobacco use on the vascular system and overall health. There was an emphasis on achieving complete cessation.           Imaging Obtained/Reviewed   Study: carotid duplex  Date:   5/27/25        Medical History     Past Medical History:   Diagnosis Date    Atherosclerosis of arteries of extremities     Breast cancer      Past Surgical History:   Procedure Laterality Date    CATARACT EXTRACTION      HYSTERECTOMY      MASTECTOMY Left 07/2020    NASAL SEPTUM SURGERY      NECK SURGERY       Family History   Problem Relation Name Age of Onset    Pancreatic cancer Mother      Prostate cancer Father      Breast cancer Sister       Social History[1]  Current Outpatient Medications   Medication Instructions    amitriptyline (ELAVIL) 25 mg, Oral, Nightly    amLODIPine (NORVASC) 5 mg, Oral, 2 times daily    aspirin (ECOTRIN) 81 MG EC tablet aspirin 81 mg tablet,delayed release   Take 1 tablet every day by oral route.    blood sugar diagnostic Strp   Free style Lite Test strips, See Instructions, DX: E11.9 to test sugar daily, # 100 EA, 11 Refill(s)    busPIRone (BUSPAR) 5 mg, Oral, 2 times daily PRN    calcium carbonate (OS-YRN) 600 mg, Daily PRN    EScitalopram oxalate (LEXAPRO) 20 mg, Oral, Daily    letrozole (FEMARA) 2.5 mg, Oral, Daily    levothyroxine (SYNTHROID) 88 mcg, Oral, Before breakfast    losartan-hydrochlorothiazide 100-12.5 mg (HYZAAR) 100-12.5 mg Tab 1 tablet, Oral, Daily    meloxicam (MOBIC) 7.5 mg, Oral, Daily    metoprolol succinate (TOPROL-XL) 100 mg, Oral, Daily    pantoprazole (PROTONIX) 40 mg, Oral, Daily    rosuvastatin (CRESTOR) 10 mg, Oral, Nightly     Review of patient's allergies indicates:   Allergen Reactions    Ace inhibitors Swelling    Lisinopril     Metformin Diarrhea    Phenazopyridine     Nitrofurantoin  monohyd/m-cryst Rash       Patient Care Team:  Mark Lawton II, MD as PCP - General (Internal Medicine)  Valentino, Vernon A., MD as Consulting Physician (Cardiology)  Orlando Steinberg MD as Vascular Surgery (Vascular Surgery)  Alberto Sol MD as Consulting Physician (Orthopedic Surgery)        No follow-ups on file. In addition to their scheduled follow up, the patient has also been instructed to follow up on as needed basis.     Future Appointments   Date Time Provider Department Center   8/21/2025  1:30 PM Mark Lawton II, MD [facility] [id number]   10/9/2025 11:00 AM [facility]   12/9/2025  2:00 PM [facility]   12/9/2025  3:00 PM [facility]             [1]   Social History  Socioeconomic History    Marital status:    Tobacco Use    Smoking status: Every Day     Current packs/day: 0.50     Types: Cigarettes    Smokeless tobacco: Never   Substance and Sexual Activity    Alcohol use: Yes    Drug use: Never    Sexual activity: Not Currently     Social Drivers of Health     Physical Activity: Sufficiently Active (8/10/2023)    Exercise Vital Sign     Days of Exercise per Week: 5 days     Minutes of Exercise per Session: 30 min   Stress: No Stress Concern Present (8/10/2023)    [employer] - Occupational Stress Questionnaire     Feeling of Stress : Not at all

## 2025-05-27 ENCOUNTER — OFFICE VISIT (OUTPATIENT)
Dept: VASCULAR SURGERY | Facility: CLINIC | Age: 80
End: 2025-05-27
Attending: SPECIALIST
Payer: MEDICARE

## 2025-05-27 VITALS — HEART RATE: 69 BPM | DIASTOLIC BLOOD PRESSURE: 52 MMHG | SYSTOLIC BLOOD PRESSURE: 157 MMHG

## 2025-05-27 DIAGNOSIS — F17.200 SMOKER: ICD-10-CM

## 2025-05-27 DIAGNOSIS — I65.23 BILATERAL CAROTID ARTERY STENOSIS: Primary | ICD-10-CM

## 2025-05-27 PROCEDURE — 99213 OFFICE O/P EST LOW 20 MIN: CPT | Mod: ,,, | Performed by: SPECIALIST

## 2025-06-16 ENCOUNTER — TELEPHONE (OUTPATIENT)
Dept: INTERNAL MEDICINE | Facility: CLINIC | Age: 80
End: 2025-06-16
Payer: MEDICARE

## 2025-06-16 DIAGNOSIS — R39.89 URINE TROUBLES: Primary | ICD-10-CM

## 2025-06-16 NOTE — TELEPHONE ENCOUNTER
Copied from CRM #[id number]. Topic: Appointments - Amb Referral  >> Jun 16, 2025  2:31 PM Monika wrote:  Who Called: Marlin Herrera    What order is the patient requesting: UA   When does the expect the orders to be performed? asap        Preferred Method of Contact: Phone Call  Patient's Preferred Phone Number on File: [phone]   Best Call Back Number, if different:  Additional Information: Patient is requesting a UA order states she has a burning sensation when urinating. Also states she would like a referral to a Urologist.

## 2025-06-17 ENCOUNTER — LAB VISIT (OUTPATIENT)
Dept: LAB | Facility: HOSPITAL | Age: 80
End: 2025-06-17
Attending: INTERNAL MEDICINE
Payer: MEDICARE

## 2025-06-17 ENCOUNTER — RESULTS FOLLOW-UP (OUTPATIENT)
Dept: INTERNAL MEDICINE | Facility: CLINIC | Age: 80
End: 2025-06-17
Payer: MEDICARE

## 2025-06-17 ENCOUNTER — TELEPHONE (OUTPATIENT)
Dept: INTERNAL MEDICINE | Facility: CLINIC | Age: 80
End: 2025-06-17
Payer: MEDICARE

## 2025-06-17 DIAGNOSIS — R39.89 URINE TROUBLES: ICD-10-CM

## 2025-06-17 DIAGNOSIS — N39.0 URINARY TRACT INFECTION WITH HEMATURIA, SITE UNSPECIFIED: Primary | ICD-10-CM

## 2025-06-17 DIAGNOSIS — R31.9 URINARY TRACT INFECTION WITH HEMATURIA, SITE UNSPECIFIED: Primary | ICD-10-CM

## 2025-06-17 LAB
BACTERIA #/AREA URNS AUTO: ABNORMAL /HPF
BILIRUB UR QL STRIP.AUTO: NEGATIVE
CLARITY UR: CLEAR
COLOR UR AUTO: ABNORMAL
GLUCOSE UR QL STRIP: NORMAL
HGB UR QL STRIP: ABNORMAL
KETONES UR QL STRIP: NEGATIVE
LEUKOCYTE ESTERASE UR QL STRIP: 500
NITRITE UR QL STRIP: NEGATIVE
PH UR STRIP: 6.5 [PH]
PROT UR QL STRIP: NEGATIVE
RBC #/AREA URNS AUTO: ABNORMAL /HPF
SP GR UR STRIP.AUTO: 1.01 (ref 1–1.03)
SQUAMOUS #/AREA URNS LPF: ABNORMAL /HPF
UROBILINOGEN UR STRIP-ACNC: NORMAL
WBC #/AREA URNS AUTO: ABNORMAL /HPF

## 2025-06-17 PROCEDURE — 87086 URINE CULTURE/COLONY COUNT: CPT

## 2025-06-17 PROCEDURE — 81001 URINALYSIS AUTO W/SCOPE: CPT

## 2025-06-17 RX ORDER — CIPROFLOXACIN 500 MG/1
500 TABLET, FILM COATED ORAL 2 TIMES DAILY
Qty: 10 TABLET | Refills: 0 | Status: SHIPPED | OUTPATIENT
Start: 2025-06-17 | End: 2025-06-17

## 2025-06-17 RX ORDER — CIPROFLOXACIN 500 MG/1
500 TABLET, FILM COATED ORAL 2 TIMES DAILY
Qty: 10 TABLET | Refills: 0 | Status: SHIPPED | OUTPATIENT
Start: 2025-06-17 | End: 2025-06-22

## 2025-06-17 NOTE — TELEPHONE ENCOUNTER
Copied from CRM #[id number]. Topic: General Inquiry - Patient Advice  >> Jun 17, 2025  9:09 AM Geni wrote:  Who Called: Chris uro    Caller is requesting assistance/information from provider's office.    Symptoms (please be specific): n/a   How long has patient had these symptoms:  n/a  List of preferred pharmacies on file (remove unneeded): n/a      Preferred Method of Contact: Phone Call  Patient's Preferred Phone Number on File: [phone]   Best Call Back Number, if different:[phone]  Additional Information: Caller stated that in referral dx stated urine troubles, needs more details on referral. Can give verbal details. Please advise.

## 2025-06-19 LAB — BACTERIA UR CULT: ABNORMAL

## 2025-06-30 ENCOUNTER — TELEPHONE (OUTPATIENT)
Dept: HEMATOLOGY/ONCOLOGY | Facility: CLINIC | Age: 80
End: 2025-06-30
Payer: MEDICARE

## 2025-07-14 DIAGNOSIS — I10 ESSENTIAL HYPERTENSION: ICD-10-CM

## 2025-07-14 RX ORDER — LOSARTAN POTASSIUM AND HYDROCHLOROTHIAZIDE 12.5; 1 MG/1; MG/1
1 TABLET ORAL DAILY
Qty: 90 TABLET | Refills: 3 | Status: SHIPPED | OUTPATIENT
Start: 2025-07-14 | End: 2026-07-14

## 2025-07-31 ENCOUNTER — HOSPITAL ENCOUNTER (OUTPATIENT)
Dept: RADIOLOGY | Facility: HOSPITAL | Age: 80
Discharge: HOME | End: 2025-07-31
Attending: NURSE PRACTITIONER
Payer: MEDICARE

## 2025-07-31 DIAGNOSIS — C50.912 PRIMARY MALIGNANT NEOPLASM OF LEFT BREAST: ICD-10-CM

## 2025-07-31 DIAGNOSIS — Z12.31 ENCOUNTER FOR SCREENING MAMMOGRAM FOR BREAST CANCER: ICD-10-CM

## 2025-07-31 PROCEDURE — 77063 BREAST TOMOSYNTHESIS BI: CPT | Mod: TC,52

## 2025-08-06 ENCOUNTER — TELEPHONE (OUTPATIENT)
Dept: INTERNAL MEDICINE | Facility: CLINIC | Age: 80
End: 2025-08-06
Payer: MEDICARE

## 2025-08-06 DIAGNOSIS — R10.9 ABDOMINAL PAIN, UNSPECIFIED ABDOMINAL LOCATION: Primary | ICD-10-CM

## 2025-08-06 NOTE — TELEPHONE ENCOUNTER
Copied from CRM #[id number]. Topic: General Inquiry - Information Request  >> Aug 6, 2025 11:16 AM Mag wrote:  Who Called: Marlin Herrera    Caller is requesting assistance/information from provider's office.    Symptoms (please be specific):   How long has patient had these symptoms:    List of preferred pharmacies on file (remove unneeded): [unfilled]  If different, enter pharmacy into here including location and phone number:     Preferred Method of Contact: Phone Call  Patient's Preferred Phone Number on File: [phone]   Best Call Back Number, if different:  Additional Information: Patient is requesting labs be completed at [facility]. Please advise

## 2025-08-06 NOTE — TELEPHONE ENCOUNTER
Spoke with patient at this time explained h. Pylori was stool test and she wants to hold off on that for now.

## 2025-08-06 NOTE — TELEPHONE ENCOUNTER
Spoke with patient at this time explained [facility].    Is having a lot of bloating and diarrhea. Worried about h. Pylori ok to order?

## 2025-08-19 ENCOUNTER — LAB VISIT (OUTPATIENT)
Dept: LAB | Facility: HOSPITAL | Age: 80
End: 2025-08-19
Attending: INTERNAL MEDICINE
Payer: MEDICARE

## 2025-08-19 DIAGNOSIS — E11.40 TYPE 2 DIABETES MELLITUS WITH DIABETIC NEUROPATHY, WITHOUT LONG-TERM CURRENT USE OF INSULIN: ICD-10-CM

## 2025-08-19 DIAGNOSIS — I10 ESSENTIAL HYPERTENSION: ICD-10-CM

## 2025-08-19 DIAGNOSIS — Z79.811 LONG TERM (CURRENT) USE OF AROMATASE INHIBITORS: ICD-10-CM

## 2025-08-19 DIAGNOSIS — I70.209 ATHEROSCLEROSIS OF ARTERIES OF EXTREMITIES: ICD-10-CM

## 2025-08-19 DIAGNOSIS — E55.9 VITAMIN D DEFICIENCY: ICD-10-CM

## 2025-08-19 DIAGNOSIS — I73.9 PERIPHERAL VASCULAR DISEASE: ICD-10-CM

## 2025-08-19 DIAGNOSIS — C50.919 PRIMARY MALIGNANT NEOPLASM OF BREAST, UNSPECIFIED LATERALITY: ICD-10-CM

## 2025-08-19 DIAGNOSIS — E78.5 DYSLIPIDEMIA: ICD-10-CM

## 2025-08-19 DIAGNOSIS — Z00.00 WELLNESS EXAMINATION: ICD-10-CM

## 2025-08-19 DIAGNOSIS — Z72.0 TOBACCO USER: ICD-10-CM

## 2025-08-19 DIAGNOSIS — E03.8 OTHER SPECIFIED HYPOTHYROIDISM: ICD-10-CM

## 2025-08-19 DIAGNOSIS — J43.9 PULMONARY EMPHYSEMA, UNSPECIFIED EMPHYSEMA TYPE: ICD-10-CM

## 2025-08-19 LAB
ALBUMIN SERPL-MCNC: 3.9 G/DL (ref 3.4–4.8)
ALBUMIN/CREAT UR: 32.1 MG/GM CR (ref 0–30)
ALBUMIN/GLOB SERPL: 1.1 RATIO (ref 1.1–2)
ALP SERPL-CCNC: 57 UNIT/L (ref 40–150)
ALT SERPL-CCNC: 16 UNIT/L (ref 0–55)
ANION GAP SERPL CALC-SCNC: 6 MEQ/L
AST SERPL-CCNC: 24 UNIT/L (ref 11–45)
BACTERIA #/AREA URNS AUTO: ABNORMAL /HPF
BASOPHILS # BLD AUTO: 0.03 X10(3)/MCL
BASOPHILS NFR BLD AUTO: 0.5 %
BILIRUB SERPL-MCNC: 0.6 MG/DL
BILIRUB UR QL STRIP.AUTO: NEGATIVE
BUN SERPL-MCNC: 18.8 MG/DL (ref 9.8–20.1)
CALCIUM SERPL-MCNC: 9.4 MG/DL (ref 8.4–10.2)
CHLORIDE SERPL-SCNC: 103 MMOL/L (ref 98–107)
CHOLEST SERPL-MCNC: 175 MG/DL
CHOLEST/HDLC SERPL: 5 {RATIO} (ref 0–5)
CLARITY UR: CLEAR
CO2 SERPL-SCNC: 32 MMOL/L (ref 23–31)
COLOR UR AUTO: ABNORMAL
CREAT SERPL-MCNC: 0.69 MG/DL (ref 0.55–1.02)
CREAT UR-MCNC: 143.1 MG/DL (ref 45–106)
CREAT/UREA NIT SERPL: 27
EOSINOPHIL # BLD AUTO: 0.13 X10(3)/MCL (ref 0–0.9)
EOSINOPHIL NFR BLD AUTO: 2.3 %
ERYTHROCYTE [DISTWIDTH] IN BLOOD BY AUTOMATED COUNT: 11.8 % (ref 11.5–17)
EST. AVERAGE GLUCOSE BLD GHB EST-MCNC: 145.6 MG/DL
GFR SERPLBLD CREATININE-BSD FMLA CKD-EPI: >60 ML/MIN/1.73/M2
GLOBULIN SER-MCNC: 3.4 GM/DL (ref 2.4–3.5)
GLUCOSE SERPL-MCNC: 130 MG/DL (ref 82–115)
GLUCOSE UR QL STRIP: NEGATIVE
HBA1C MFR BLD: 6.7 %
HCT VFR BLD AUTO: 41.8 % (ref 37–47)
HDLC SERPL-MCNC: 34 MG/DL (ref 35–60)
HGB BLD-MCNC: 14 G/DL (ref 12–16)
HGB UR QL STRIP: NEGATIVE
IMM GRANULOCYTES # BLD AUTO: 0.01 X10(3)/MCL (ref 0–0.04)
IMM GRANULOCYTES NFR BLD AUTO: 0.2 %
KETONES UR QL STRIP: NEGATIVE
LDLC SERPL CALC-MCNC: 81 MG/DL (ref 50–140)
LEUKOCYTE ESTERASE UR QL STRIP: ABNORMAL
LYMPHOCYTES # BLD AUTO: 1.42 X10(3)/MCL (ref 0.6–4.6)
LYMPHOCYTES NFR BLD AUTO: 24.6 %
MCH RBC QN AUTO: 32.8 PG (ref 27–31)
MCHC RBC AUTO-ENTMCNC: 33.5 G/DL (ref 33–36)
MCV RBC AUTO: 97.9 FL (ref 80–94)
MICROALBUMIN UR-MCNC: 45.9 UG/ML
MONOCYTES # BLD AUTO: 0.68 X10(3)/MCL (ref 0.1–1.3)
MONOCYTES NFR BLD AUTO: 11.8 %
NEUTROPHILS # BLD AUTO: 3.5 X10(3)/MCL (ref 2.1–9.2)
NEUTROPHILS NFR BLD AUTO: 60.6 %
NITRITE UR QL STRIP: NEGATIVE
PH UR STRIP: 6.5 [PH]
PLATELET # BLD AUTO: 154 X10(3)/MCL (ref 130–400)
PMV BLD AUTO: 9.9 FL (ref 7.4–10.4)
POTASSIUM SERPL-SCNC: 4.2 MMOL/L (ref 3.5–5.1)
PROT SERPL-MCNC: 7.3 GM/DL (ref 5.8–7.6)
PROT UR QL STRIP: NEGATIVE
RBC # BLD AUTO: 4.27 X10(6)/MCL (ref 4.2–5.4)
RBC #/AREA URNS AUTO: ABNORMAL /HPF
SODIUM SERPL-SCNC: 141 MMOL/L (ref 136–145)
SP GR UR STRIP.AUTO: 1.02 (ref 1–1.03)
SQUAMOUS #/AREA URNS AUTO: ABNORMAL /HPF
T4 FREE SERPL-MCNC: 1.07 NG/DL (ref 0.7–1.48)
TRIGL SERPL-MCNC: 298 MG/DL (ref 37–140)
TSH SERPL-ACNC: 0.85 UIU/ML (ref 0.35–4.94)
UROBILINOGEN UR STRIP-ACNC: 0.2
VLDLC SERPL CALC-MCNC: 60 MG/DL
WBC # BLD AUTO: 5.77 X10(3)/MCL (ref 4.5–11.5)
WBC #/AREA URNS AUTO: ABNORMAL /HPF

## 2025-08-19 PROCEDURE — 80053 COMPREHEN METABOLIC PANEL: CPT

## 2025-08-19 PROCEDURE — 36415 COLL VENOUS BLD VENIPUNCTURE: CPT

## 2025-08-19 PROCEDURE — 81003 URINALYSIS AUTO W/O SCOPE: CPT

## 2025-08-19 PROCEDURE — 82570 ASSAY OF URINE CREATININE: CPT

## 2025-08-19 PROCEDURE — 80061 LIPID PANEL: CPT

## 2025-08-19 PROCEDURE — 83036 HEMOGLOBIN GLYCOSYLATED A1C: CPT

## 2025-08-19 PROCEDURE — 85025 COMPLETE CBC W/AUTO DIFF WBC: CPT

## 2025-08-19 PROCEDURE — 84439 ASSAY OF FREE THYROXINE: CPT

## 2025-08-19 PROCEDURE — 84443 ASSAY THYROID STIM HORMONE: CPT

## 2025-08-21 ENCOUNTER — TELEPHONE (OUTPATIENT)
Dept: INTERNAL MEDICINE | Facility: CLINIC | Age: 80
End: 2025-08-21

## 2025-08-28 ENCOUNTER — OFFICE VISIT (OUTPATIENT)
Dept: INTERNAL MEDICINE | Facility: CLINIC | Age: 80
End: 2025-08-28
Payer: MEDICARE

## 2025-08-28 VITALS
HEART RATE: 68 BPM | SYSTOLIC BLOOD PRESSURE: 155 MMHG | WEIGHT: 133.63 LBS | DIASTOLIC BLOOD PRESSURE: 54 MMHG | BODY MASS INDEX: 25.23 KG/M2 | OXYGEN SATURATION: 97 % | TEMPERATURE: 98 F | HEIGHT: 61 IN

## 2025-08-28 DIAGNOSIS — I73.9 PERIPHERAL VASCULAR DISEASE: ICD-10-CM

## 2025-08-28 DIAGNOSIS — Z72.0 TOBACCO USER: ICD-10-CM

## 2025-08-28 DIAGNOSIS — E11.40 TYPE 2 DIABETES MELLITUS WITH DIABETIC NEUROPATHY, WITHOUT LONG-TERM CURRENT USE OF INSULIN: ICD-10-CM

## 2025-08-28 DIAGNOSIS — K22.70 BARRETT'S ESOPHAGUS WITHOUT DYSPLASIA: ICD-10-CM

## 2025-08-28 DIAGNOSIS — E03.8 OTHER SPECIFIED HYPOTHYROIDISM: ICD-10-CM

## 2025-08-28 DIAGNOSIS — I10 ESSENTIAL HYPERTENSION: ICD-10-CM

## 2025-08-28 DIAGNOSIS — E78.5 DYSLIPIDEMIA: ICD-10-CM

## 2025-08-28 DIAGNOSIS — Z00.00 WELLNESS EXAMINATION: Primary | ICD-10-CM

## 2025-08-28 DIAGNOSIS — J43.9 PULMONARY EMPHYSEMA, UNSPECIFIED EMPHYSEMA TYPE: ICD-10-CM

## 2025-08-28 DIAGNOSIS — F17.210 CIGARETTE NICOTINE DEPENDENCE WITHOUT COMPLICATION: ICD-10-CM

## 2025-08-28 DIAGNOSIS — E55.9 VITAMIN D DEFICIENCY: ICD-10-CM

## 2025-09-03 ENCOUNTER — TELEPHONE (OUTPATIENT)
Dept: INTERNAL MEDICINE | Facility: CLINIC | Age: 80
End: 2025-09-03
Payer: MEDICARE

## 2025-09-03 DIAGNOSIS — K21.9 GASTROESOPHAGEAL REFLUX DISEASE, UNSPECIFIED WHETHER ESOPHAGITIS PRESENT: ICD-10-CM

## 2025-09-03 RX ORDER — PANTOPRAZOLE SODIUM 40 MG/1
40 TABLET, DELAYED RELEASE ORAL DAILY
Qty: 90 TABLET | Refills: 3 | Status: SHIPPED | OUTPATIENT
Start: 2025-09-03 | End: 2026-09-03

## 2025-09-04 ENCOUNTER — HOSPITAL ENCOUNTER (OUTPATIENT)
Dept: RADIOLOGY | Facility: HOSPITAL | Age: 80
Discharge: HOME | End: 2025-09-04
Attending: NURSE PRACTITIONER
Payer: MEDICARE

## 2025-09-04 DIAGNOSIS — Z79.811 LONG TERM (CURRENT) USE OF AROMATASE INHIBITORS: ICD-10-CM

## 2025-09-04 DIAGNOSIS — M85.9 DISORDER OF BONE DENSITY AND STRUCTURE, UNSPECIFIED: ICD-10-CM

## 2025-09-04 DIAGNOSIS — M85.80 OSTEOPENIA AFTER MENOPAUSE: ICD-10-CM

## 2025-09-04 DIAGNOSIS — Z78.0 OSTEOPENIA AFTER MENOPAUSE: ICD-10-CM

## 2025-09-04 PROCEDURE — 77080 DXA BONE DENSITY AXIAL: CPT | Mod: TC

## 2025-09-04 PROCEDURE — 77081 DXA BONE DENSITY APPENDICULR: CPT | Mod: XU,TC

## 2025-09-05 DIAGNOSIS — Z00.00 WELLNESS EXAMINATION: ICD-10-CM

## 2025-09-05 RX ORDER — ESCITALOPRAM OXALATE 20 MG/1
20 TABLET ORAL DAILY
Qty: 90 TABLET | Refills: 3 | Status: SHIPPED | OUTPATIENT
Start: 2025-09-05 | End: 2026-09-05